# Patient Record
Sex: FEMALE | Race: BLACK OR AFRICAN AMERICAN | Employment: OTHER | URBAN - METROPOLITAN AREA
[De-identification: names, ages, dates, MRNs, and addresses within clinical notes are randomized per-mention and may not be internally consistent; named-entity substitution may affect disease eponyms.]

---

## 2018-12-20 ENCOUNTER — APPOINTMENT (OUTPATIENT)
Dept: GENERAL RADIOLOGY | Age: 83
End: 2018-12-20
Attending: NURSE PRACTITIONER
Payer: MEDICARE

## 2018-12-20 ENCOUNTER — HOSPITAL ENCOUNTER (EMERGENCY)
Age: 83
Discharge: HOME | End: 2018-12-20
Attending: EMERGENCY MEDICINE
Payer: MEDICARE

## 2018-12-20 ENCOUNTER — APPOINTMENT (OUTPATIENT)
Dept: CT IMAGING | Age: 83
End: 2018-12-20
Attending: NURSE PRACTITIONER
Payer: MEDICARE

## 2018-12-20 VITALS
OXYGEN SATURATION: 100 % | SYSTOLIC BLOOD PRESSURE: 125 MMHG | DIASTOLIC BLOOD PRESSURE: 71 MMHG | HEART RATE: 81 BPM | RESPIRATION RATE: 16 BRPM | TEMPERATURE: 97.5 F

## 2018-12-20 DIAGNOSIS — N30.01 ACUTE CYSTITIS WITH HEMATURIA: Primary | ICD-10-CM

## 2018-12-20 LAB
ALBUMIN SERPL-MCNC: 3.8 G/DL (ref 3.5–5)
ALBUMIN/GLOB SERPL: 0.7 {RATIO} (ref 1.1–2.2)
ALP SERPL-CCNC: 131 U/L (ref 45–117)
ALT SERPL-CCNC: 32 U/L (ref 12–78)
ANION GAP SERPL CALC-SCNC: 7 MMOL/L (ref 5–15)
APPEARANCE UR: ABNORMAL
APTT PPP: 22.6 SEC (ref 22.1–32)
AST SERPL-CCNC: 49 U/L (ref 15–37)
BACTERIA URNS QL MICRO: ABNORMAL /HPF
BASOPHILS # BLD: 0 K/UL (ref 0–0.1)
BASOPHILS NFR BLD: 0 % (ref 0–1)
BILIRUB SERPL-MCNC: 0.3 MG/DL (ref 0.2–1)
BILIRUB UR QL CFM: NEGATIVE
BUN SERPL-MCNC: 33 MG/DL (ref 6–20)
BUN/CREAT SERPL: 19 (ref 12–20)
CALCIUM SERPL-MCNC: 10.2 MG/DL (ref 8.5–10.1)
CHLORIDE SERPL-SCNC: 102 MMOL/L (ref 97–108)
CK SERPL-CCNC: 39 U/L (ref 26–192)
CO2 SERPL-SCNC: 28 MMOL/L (ref 21–32)
COLOR UR: ABNORMAL
CREAT SERPL-MCNC: 1.76 MG/DL (ref 0.55–1.02)
DIFFERENTIAL METHOD BLD: ABNORMAL
EOSINOPHIL # BLD: 0.1 K/UL (ref 0–0.4)
EOSINOPHIL NFR BLD: 1 % (ref 0–7)
EPITH CASTS URNS QL MICRO: ABNORMAL /LPF
ERYTHROCYTE [DISTWIDTH] IN BLOOD BY AUTOMATED COUNT: 13.7 % (ref 11.5–14.5)
GLOBULIN SER CALC-MCNC: 5.5 G/DL (ref 2–4)
GLUCOSE SERPL-MCNC: 120 MG/DL (ref 65–100)
GLUCOSE UR STRIP.AUTO-MCNC: NEGATIVE MG/DL
HCT VFR BLD AUTO: 40.2 % (ref 35–47)
HGB BLD-MCNC: 13.6 G/DL (ref 11.5–16)
HGB UR QL STRIP: NEGATIVE
HYALINE CASTS URNS QL MICRO: ABNORMAL /LPF (ref 0–5)
IMM GRANULOCYTES # BLD: 0 K/UL (ref 0–0.04)
IMM GRANULOCYTES NFR BLD AUTO: 0 % (ref 0–0.5)
INR PPP: 1.1 (ref 0.9–1.1)
KETONES UR QL STRIP.AUTO: ABNORMAL MG/DL
LACTATE BLD-SCNC: 2.12 MMOL/L (ref 0.4–2)
LACTATE BLD-SCNC: 2.53 MMOL/L (ref 0.4–2)
LACTATE SERPL-SCNC: 1.8 MMOL/L (ref 0.4–2)
LEUKOCYTE ESTERASE UR QL STRIP.AUTO: ABNORMAL
LIPASE SERPL-CCNC: 173 U/L (ref 73–393)
LYMPHOCYTES # BLD: 1.8 K/UL (ref 0.8–3.5)
LYMPHOCYTES NFR BLD: 22 % (ref 12–49)
MCH RBC QN AUTO: 33.8 PG (ref 26–34)
MCHC RBC AUTO-ENTMCNC: 33.8 G/DL (ref 30–36.5)
MCV RBC AUTO: 100 FL (ref 80–99)
MONOCYTES # BLD: 0.7 K/UL (ref 0–1)
MONOCYTES NFR BLD: 9 % (ref 5–13)
NEUTS SEG # BLD: 5.6 K/UL (ref 1.8–8)
NEUTS SEG NFR BLD: 68 % (ref 32–75)
NITRITE UR QL STRIP.AUTO: NEGATIVE
NRBC # BLD: 0 K/UL (ref 0–0.01)
NRBC BLD-RTO: 0 PER 100 WBC
OTHER,OTHU: ABNORMAL
PH UR STRIP: 5 [PH] (ref 5–8)
PLATELET # BLD AUTO: 125 K/UL (ref 150–400)
PMV BLD AUTO: 12.4 FL (ref 8.9–12.9)
POTASSIUM SERPL-SCNC: 3.9 MMOL/L (ref 3.5–5.1)
PROT SERPL-MCNC: 9.3 G/DL (ref 6.4–8.2)
PROT UR STRIP-MCNC: ABNORMAL MG/DL
PROTHROMBIN TIME: 11.7 SEC (ref 9–11.1)
RBC # BLD AUTO: 4.02 M/UL (ref 3.8–5.2)
RBC #/AREA URNS HPF: ABNORMAL /HPF (ref 0–5)
SODIUM SERPL-SCNC: 137 MMOL/L (ref 136–145)
SP GR UR REFRACTOMETRY: 1.02 (ref 1–1.03)
THERAPEUTIC RANGE,PTTT: NORMAL SECS (ref 58–77)
TROPONIN I SERPL-MCNC: <0.05 NG/ML
UROBILINOGEN UR QL STRIP.AUTO: 1 EU/DL (ref 0.2–1)
WBC # BLD AUTO: 8.2 K/UL (ref 3.6–11)
WBC URNS QL MICRO: >100 /HPF (ref 0–4)

## 2018-12-20 PROCEDURE — 93005 ELECTROCARDIOGRAM TRACING: CPT

## 2018-12-20 PROCEDURE — 74011250636 HC RX REV CODE- 250/636: Performed by: NURSE PRACTITIONER

## 2018-12-20 PROCEDURE — 83605 ASSAY OF LACTIC ACID: CPT

## 2018-12-20 PROCEDURE — 85025 COMPLETE CBC W/AUTO DIFF WBC: CPT

## 2018-12-20 PROCEDURE — 96365 THER/PROPH/DIAG IV INF INIT: CPT

## 2018-12-20 PROCEDURE — 74011000258 HC RX REV CODE- 258: Performed by: EMERGENCY MEDICINE

## 2018-12-20 PROCEDURE — 74011250637 HC RX REV CODE- 250/637: Performed by: NURSE PRACTITIONER

## 2018-12-20 PROCEDURE — 70450 CT HEAD/BRAIN W/O DYE: CPT

## 2018-12-20 PROCEDURE — 74011250636 HC RX REV CODE- 250/636: Performed by: EMERGENCY MEDICINE

## 2018-12-20 PROCEDURE — 85610 PROTHROMBIN TIME: CPT

## 2018-12-20 PROCEDURE — 85730 THROMBOPLASTIN TIME PARTIAL: CPT

## 2018-12-20 PROCEDURE — 83690 ASSAY OF LIPASE: CPT

## 2018-12-20 PROCEDURE — 96361 HYDRATE IV INFUSION ADD-ON: CPT

## 2018-12-20 PROCEDURE — 82550 ASSAY OF CK (CPK): CPT

## 2018-12-20 PROCEDURE — 80053 COMPREHEN METABOLIC PANEL: CPT

## 2018-12-20 PROCEDURE — 36415 COLL VENOUS BLD VENIPUNCTURE: CPT

## 2018-12-20 PROCEDURE — 74176 CT ABD & PELVIS W/O CONTRAST: CPT

## 2018-12-20 PROCEDURE — 99285 EMERGENCY DEPT VISIT HI MDM: CPT

## 2018-12-20 PROCEDURE — 81001 URINALYSIS AUTO W/SCOPE: CPT

## 2018-12-20 PROCEDURE — 71046 X-RAY EXAM CHEST 2 VIEWS: CPT

## 2018-12-20 PROCEDURE — 84484 ASSAY OF TROPONIN QUANT: CPT

## 2018-12-20 RX ORDER — CEPHALEXIN 500 MG/1
500 CAPSULE ORAL 2 TIMES DAILY
Qty: 14 CAP | Refills: 0 | Status: SHIPPED | OUTPATIENT
Start: 2018-12-20 | End: 2018-12-27

## 2018-12-20 RX ORDER — ACETAMINOPHEN 500 MG
1000 TABLET ORAL
Status: COMPLETED | OUTPATIENT
Start: 2018-12-20 | End: 2018-12-20

## 2018-12-20 RX ADMIN — SODIUM CHLORIDE 1000 ML: 900 INJECTION, SOLUTION INTRAVENOUS at 16:22

## 2018-12-20 RX ADMIN — CEFTRIAXONE SODIUM 1 G: 1 INJECTION, POWDER, FOR SOLUTION INTRAMUSCULAR; INTRAVENOUS at 20:03

## 2018-12-20 RX ADMIN — ACETAMINOPHEN 1000 MG: 500 TABLET ORAL at 16:21

## 2018-12-20 NOTE — ED PROVIDER NOTES
EMERGENCY DEPARTMENT HISTORY AND PHYSICAL EXAM      Date: 12/20/2018  Patient Name: Vikash Weller    History of Presenting Illness     Chief Complaint   Patient presents with    Lethargy       History Provided By: Patient    HPI: Vikash Weller, 80 y.o. female with PMHx significant for HTN, stroke, CAD, dementia, arthritis, GERD, and breast CA, presents via EMS to the ED with cc of fatigue, HA, malaise, generalized weakness, and abdominal pain. She states that she just hasn't felt well for the last two days. She activated EMS this am because she did not feel well enough to get out of bed. Pt denies fevers, chills, night sweats, chest pain, pressure, SOB, YANES, PND, orthopnea, n/v/d, melena, hematuria, dysuria, constipation, dizziness, and syncope. She does endorse and HA but family states that she has complained of a HA daily for the last several years. PCP: Deb Plata MD    Current Outpatient Medications   Medication Sig Dispense Refill    cephALEXin (KEFLEX) 500 mg capsule Take 1 Cap by mouth two (2) times a day for 7 days. 14 Cap 0    traMADol (ULTRAM) 50 mg tablet Take 0.5 Tabs by mouth every eight (8) hours as needed for Pain. Max Daily Amount: 75 mg. 15 Tab 0    metoprolol succinate (TOPROL-XL) 25 mg XL tablet Take 1 Tab by mouth daily. 30 Tab 3    furosemide (LASIX) 20 mg tablet Take one tablet daily 30 Tab 3    omeprazole (PRILOSEC) 40 mg capsule Take 1 Cap by mouth daily. 30 Cap 3    nitroglycerin (NITROSTAT) 0.4 mg SL tablet 0.4 mg by SubLINGual route every five (5) minutes as needed for Chest Pain.  warfarin (COUMADIN) 5 mg tablet Take 5 mg by mouth daily.  atorvastatin (LIPITOR) 20 mg tablet Take 20 mg by mouth daily.  lisinopril (PRINIVIL, ZESTRIL) 5 mg tablet Take 5 mg by mouth daily.  aspirin 81 mg chewable tablet Take 81 mg by mouth daily.          Past History     Past Medical History:  Past Medical History:   Diagnosis Date    Acid reflux  Arthritis     CAD (coronary artery disease)     MI, no cardiologist at this time    [facility])     breast cancer    Dementia     Hypertension     Stroke ([facility] [address]     approx 15 years prior per daughter (2000)       Past Surgical History:  Past Surgical History:   Procedure Laterality Date    BREAST SURGERY PROCEDURE UNLISTED      right breast removal due to cancer    CARDIAC SURG PROCEDURE UNLIST      x1 stent placed    HX GYN      x6 vaginal births       Family History:  No family history on file. Social History:  Social History     Tobacco Use    Smoking status: Never Smoker   Substance Use Topics    Alcohol use: No    Drug use: Not on file       Allergies:  No Known Allergies      Review of Systems   Review of Systems   Constitutional: Positive for fatigue. Negative for activity change, appetite change, chills, diaphoresis, fever and unexpected weight change. HENT: Negative for congestion, ear pain, rhinorrhea, sinus pressure, sore throat and tinnitus. Eyes: Negative for photophobia, pain, discharge and visual disturbance. Respiratory: Negative for apnea, cough, choking, chest tightness, shortness of breath, wheezing and stridor. Cardiovascular: Negative for chest pain, palpitations and leg swelling. Gastrointestinal: Positive for abdominal pain. Negative for constipation, diarrhea, nausea and vomiting. Endocrine: Negative for polydipsia, polyphagia and polyuria. Genitourinary: Negative for decreased urine volume, dyspareunia, dysuria, enuresis, flank pain, frequency, hematuria and urgency. Musculoskeletal: Negative for arthralgias, back pain, gait problem, myalgias and neck pain. Skin: Negative for color change, pallor, rash and wound. Allergic/Immunologic: Negative for immunocompromised state. Neurological: Positive for weakness and headaches. Negative for dizziness, seizures, syncope and light-headedness. Hematological: Does not bruise/bleed easily. Psychiatric/Behavioral: Negative for agitation and confusion. The patient is not nervous/anxious. Physical Exam   Physical Exam   Constitutional: She is oriented to person, place, and time. She appears well-developed and well-nourished. No distress. HENT:   Head: Normocephalic. Right Ear: External ear normal.   Left Ear: External ear normal.   Mouth/Throat: Oropharynx is clear and moist. No oropharyngeal exudate. Eyes: Conjunctivae and EOM are normal. Pupils are equal, round, and reactive to light. Right eye exhibits no discharge. Left eye exhibits no discharge. No scleral icterus. Neck: Normal range of motion. Neck supple. No JVD present. No tracheal deviation present. No thyromegaly present. Cardiovascular: Normal rate, regular rhythm, normal heart sounds and intact distal pulses. Exam reveals no gallop and no friction rub. No murmur heard. Pulmonary/Chest: Effort normal and breath sounds normal. No stridor. No respiratory distress. She has no wheezes. She has no rales. She exhibits no tenderness. Abdominal: Soft. Bowel sounds are normal. She exhibits no distension and no mass. There is no tenderness. There is no rebound and no guarding. Musculoskeletal: Normal range of motion. She exhibits no edema or tenderness. Lymphadenopathy:     She has no cervical adenopathy. Neurological: She is alert and oriented to person, place, and time. She displays normal reflexes. No cranial nerve deficit. Coordination normal.   Skin: Skin is warm and dry. No rash noted. She is not diaphoretic. No erythema. No pallor. Psychiatric: She has a normal mood and affect. Her behavior is normal. Judgment and thought content normal.   Nursing note and vitals reviewed. Diagnostic Study Results     Labs -   No results found for this or any previous visit (from the past 12 hour(s)).     Radiologic Studies -   CT ABD PELV WO CONT   Final Result   IMPRESSION:      Cholelithiasis    Persistent complex pelvic mass, possibly a pedunculated fibroid or complex   ovarian cyst    Incidental sigmoid diverticulosis      CT HEAD WO CONT   Final Result   IMPRESSION:      Stable nonspecific white matter disease. No acute intracranial hemorrhage seen. XR CHEST PA LAT   Final Result   IMPRESSION: No acute process. Mild T7 wedge deformity. CT Results  (Last 48 hours)               12/20/18 1809  CT HEAD WO CONT Final result    Impression:  IMPRESSION:       Stable nonspecific white matter disease. No acute intracranial hemorrhage seen. Narrative:  EXAM: CT HEAD WO CONT       INDICATION: Headache for 2 days, nausea, and lethargy       COMPARISON: 4/2/2015. CONTRAST: None. TECHNIQUE: Unenhanced CT of the head was performed using 5 mm images. Brain and   bone windows were generated. CT dose reduction was achieved through use of a   standardized protocol tailored for this examination and automatic exposure   control for dose modulation. FINDINGS:   The ventricles and sulci are normal in size, shape and configuration and   midline. There is moderate periventricular white matter hypodensity. Stable   hypodensity is present in the right external capsule. Bifrontal volume loss is   seen. There is no intracranial hemorrhage, extra-axial collection, mass, mass   effect or midline shift. The basilar cisterns are open. No acute infarct is   identified. The bone windows demonstrate no abnormalities. The visualized   portions of the paranasal sinuses and mastoid air cells are clear. 12/20/18 1809  CT ABD PELV WO CONT Final result    Impression:  IMPRESSION:       Cholelithiasis    Persistent complex pelvic mass, possibly a pedunculated fibroid or complex   ovarian cyst    Incidental sigmoid diverticulosis       Narrative:  EXAM: CT ABD PELV WO CONT       INDICATION: Abdominal pain and lethargy for 2 days        COMPARISON: 4/26/2016       CONTRAST:  None.        TECHNIQUE:    Thin axial images were obtained through the abdomen and pelvis. Coronal and   sagittal reconstructions were generated. Oral contrast was not administered. CT   dose reduction was achieved through use of a standardized protocol tailored for   this examination and automatic exposure control for dose modulation. The absence of intravenous contrast material reduces the sensitivity for   evaluation of the solid parenchymal organs of the abdomen. FINDINGS:    LUNG BASES: Clear. INCIDENTALLY IMAGED HEART AND MEDIASTINUM: Unremarkable. LIVER: No mass or biliary dilatation. GALLBLADDER: Layering gallstones. SPLEEN: No mass. PANCREAS: No mass or ductal dilatation. ADRENALS: Unremarkable. KIDNEYS/URETERS: No mass, calculus, or hydronephrosis. Right mid renal 2 cm cyst   with Hounsfield unit measurement of 5 7. STOMACH: Unremarkable. SMALL BOWEL: No dilatation or wall thickening. COLON: No dilatation or wall thickening. Sigmoid diverticula   APPENDIX: Unremarkable. PERITONEUM: No ascites or pneumoperitoneum. RETROPERITONEUM: No lymphadenopathy or aortic aneurysm. REPRODUCTIVE ORGANS: Fibroid uterus. Anterior to the uterus is a persistent   partially solid partially cystic mass that measures 6.6 x 5.7 x 6.1 cm (image   67). Previously, this measured 7.6 x 6.6 x 6.9 cm. URINARY BLADDER: No mass or calculus. BONES: No destructive bone lesion. ADDITIONAL COMMENTS: N/A               CXR Results  (Last 48 hours)               12/20/18 1635  XR CHEST PA LAT Final result    Impression:  IMPRESSION: No acute process. Mild T7 wedge deformity. Narrative:  EXAM: XR CHEST PA LAT       INDICATION: malaise       COMPARISON: 4/26/2016. FINDINGS: A single view of the chest demonstrates mild in cardiomegaly.  tThe   cardiac and mediastinal contours and pulmonary vascularity are normal. There is   a wedge deformity of the T7 vertebral body                   Medical Decision Making   I am the first provider for this patient. I reviewed the vital signs, available nursing notes, past medical history, past surgical history, family history and social history. Vital Signs-Reviewed the patient's vital signs. Visit Vitals  /71   Pulse 81   Temp 97.5 °F (36.4 °C)   Resp 16   SpO2 100%       Pulse Oximetry Analysis - 100% on RA    Cardiac Monitor:   Rate: 91 bpm      Records Reviewed: Nursing Notes, Old Medical Records, Previous electrocardiograms, Previous Radiology Studies and Previous Laboratory Studies    Provider Notes (Medical Decision Making):   Malaise  HA  Abdominal pain    Routine laboratory data and UA  CT head  CT abd/pelvis  Analgesia   Rocephin     ED Course:   Initial assessment performed. The patients presenting problems have been discussed, and they are in agreement with the care plan formulated and outlined with them. I have encouraged them to ask questions as they arise throughout their visit. Stable ambulatory pt in [facility]    Critical Care Time:   0    Disposition:  Discharge to home with PCP follow up  PT already established and currently working with patient     PLAN:  1. Discharge Medication List as of 12/20/2018  7:54 PM      CONTINUE these medications which have CHANGED    Details   cephALEXin (KEFLEX) 500 mg capsule Take 1 Cap by mouth two (2) times a day for 7 days. , Normal, Disp-14 Cap, R-0         CONTINUE these medications which have NOT CHANGED    Details   traMADol (ULTRAM) 50 mg tablet Take 0.5 Tabs by mouth every eight (8) hours as needed for Pain. Max Daily Amount: 75 mg., Print, Disp-15 Tab, R-0      metoprolol succinate (TOPROL-XL) 25 mg XL tablet Take 1 Tab by mouth daily. , Print, Disp-30 Tab, R-3      furosemide (LASIX) 20 mg tablet Take one tablet daily, Print, Disp-30 Tab, R-3      omeprazole (PRILOSEC) 40 mg capsule Take 1 Cap by mouth daily. , Print, Disp-30 Cap, R-3      nitroglycerin (NITROSTAT) 0.4 mg SL tablet 0.4 mg by SubLINGual route every five (5) minutes as needed for Chest Pain., Historical Med      warfarin (COUMADIN) 5 mg tablet Take 5 mg by mouth daily. , Historical Med      atorvastatin (LIPITOR) 20 mg tablet Take 20 mg by mouth daily. , Historical Med      lisinopril (PRINIVIL, ZESTRIL) 5 mg tablet Take 5 mg by mouth daily. , Historical Med      aspirin 81 mg chewable tablet Take 81 mg by mouth daily. , Historical Med           2. Follow-up Information     Follow up With Specialties Details Why Airam Uriarte MD Family Practice In 1 day  [address] [place] [address]  [phone]      [facility] EMERGENCY DEPT Emergency Medicine  As needed, If symptoms worsen [address] [place] [address] Drive  [address] [facility]  [phone]        Return to ED if worse     Diagnosis     Clinical Impression:   1.  Acute cystitis with hematuria        Attestations:    Karena Yung NP  8:34 PM

## 2018-12-20 NOTE — ED TRIAGE NOTES
Patient arrives to the emergency department via EMS with a chief complaint of lethargy and a headache x2 days. Patient reports feeling more lethargic the last 2 days than normal. Reports nausea and a frontal headache x2 days. Also reports unsteady gait, patient reports she thinks it is because she is very weak.

## 2018-12-21 LAB
ATRIAL RATE: 102 BPM
CALCULATED R AXIS, ECG10: -52 DEGREES
CALCULATED T AXIS, ECG11: 92 DEGREES
DIAGNOSIS, 93000: NORMAL
Q-T INTERVAL, ECG07: 362 MS
QRS DURATION, ECG06: 94 MS
QTC CALCULATION (BEZET), ECG08: 471 MS
VENTRICULAR RATE, ECG03: 102 BPM

## 2018-12-21 NOTE — ED NOTES
Justin Sotelo. PA reviewed discharge instructions with the patient. The patient verbalized understanding. All questions and concerns were addressed. The patient was taken out in a wheelchair and is discharged ambulatory in the care of family members with instructions and prescriptions in hand. Pt is alert and oriented x 4. Respirations are clear and unlabored.

## 2018-12-21 NOTE — DISCHARGE INSTRUCTIONS
Urinary Tract Infection in Women: Care Instructions  Your Care Instructions    A urinary tract infection, or UTI, is a general term for an infection anywhere between the kidneys and the urethra (where urine comes out). Most UTIs are bladder infections. They often cause pain or burning when you urinate. UTIs are caused by bacteria and can be cured with antibiotics. Be sure to complete your treatment so that the infection goes away. Follow-up care is a key part of your treatment and safety. Be sure to make and go to all appointments, and call your doctor if you are having problems. It's also a good idea to know your test results and keep a list of the medicines you take. How can you care for yourself at home? · Take your antibiotics as directed. Do not stop taking them just because you feel better. You need to take the full course of antibiotics. · Drink extra water and other fluids for the next day or two. This may help wash out the bacteria that are causing the infection. (If you have kidney, heart, or liver disease and have to limit fluids, talk with your doctor before you increase your fluid intake.)  · Avoid drinks that are carbonated or have caffeine. They can irritate the bladder. · Urinate often. Try to empty your bladder each time. · To relieve pain, take a hot bath or lay a heating pad set on low over your lower belly or genital area. Never go to sleep with a heating pad in place. To prevent UTIs  · Drink plenty of water each day. This helps you urinate often, which clears bacteria from your system. (If you have kidney, heart, or liver disease and have to limit fluids, talk with your doctor before you increase your fluid intake.)  · Urinate when you need to. · Urinate right after you have sex. · Change sanitary pads often. · Avoid douches, bubble baths, feminine hygiene sprays, and other feminine hygiene products that have deodorants.   · After going to the bathroom, wipe from front to back.  When should you call for help? Call your doctor now or seek immediate medical care if:    · Symptoms such as fever, chills, nausea, or vomiting get worse or appear for the first time.     · You have new pain in your back just below your rib cage. This is called flank pain.     · There is new blood or pus in your urine.     · You have any problems with your antibiotic medicine.    Watch closely for changes in your health, and be sure to contact your doctor if:    · You are not getting better after taking an antibiotic for 2 days.     · Your symptoms go away but then come back. Where can you learn more? Go to http://jamshid-hilda.info/. Enter P984 in the search box to learn more about \"Urinary Tract Infection in Women: Care Instructions. \"  Current as of: March 21, 2018  Content Version: 11.8  © 7358-5460 [employer]. Care instructions adapted under license by [employer] (which disclaims liability or warranty for this information). If you have questions about a medical condition or this instruction, always ask your healthcare professional. [address] any warranty or liability for your use of this information. We hope that we have addressed all of your medical concerns. The examination and treatment you received in the Emergency Department were for an emergent problem and were not intended as complete care. It is important that you follow up with your healthcare provider(s) for ongoing care. If your symptoms worsen or do not improve as expected, and you are unable to reach your usual health care provider(s), you should return to the Emergency Department. [facility] participate in nationally recognized quality of care measures. If your blood pressure is greater than 120/80, as reported below, we urge that you seek medical care to address the potential of high blood pressure, commonly known as hypertension. Hypertension can be hereditary or can be caused by certain medical conditions, pain, stress, or \"white coat syndrome. \"       Please make an appointment with your health care provider(s) for follow up of your Emergency Department visit. VITALS:   Patient Vitals for the past 8 hrs:   Temp Pulse Resp BP SpO2   12/20/18 1744 -- 78 15 106/50 100 %   12/20/18 1505 97.5 °F (36.4 °C) 91 14 121/51 100 %          Thank you for allowing us to provide you with medical care today. We realize that you have many choices for your emergency care needs. Please choose us in the future for any continued health care needs. Kapil Bryant NP              Recent Results (from the past 24 hour(s))   EKG, 12 LEAD, INITIAL    Collection Time: 12/20/18  2:55 PM   Result Value Ref Range    Ventricular Rate 102 BPM    Atrial Rate 102 BPM    QRS Duration 94 ms    Q-T Interval 362 ms    QTC Calculation (Bezet) 471 ms    Calculated R Axis -52 degrees    Calculated T Axis 92 degrees    Diagnosis       Atrial fibrillation with rapid ventricular response  Left axis deviation  Incomplete right bundle branch block  Septal infarct (cited on or before 20-DEC-2018)  When compared with ECG of 26-APR-2016 17:51,  Incomplete right bundle branch block is now present  Questionable change in initial forces of Septal leads     POC LACTIC ACID    Collection Time: 12/20/18  4:02 PM   Result Value Ref Range    Lactic Acid (POC) 2.12 (HH) 0.40 - 0.45 mmol/L   METABOLIC PANEL, COMPREHENSIVE    Collection Time: 12/20/18  4:09 PM   Result Value Ref Range    Sodium 137 136 - 145 mmol/L    Potassium 3.9 3.5 - 5.1 mmol/L    Chloride 102 97 - 108 mmol/L    CO2 28 21 - 32 mmol/L    Anion gap 7 5 - 15 mmol/L    Glucose 120 (H) 65 - 100 mg/dL    BUN 33 (H) 6 - 20 MG/DL    Creatinine 1.76 (H) 0.55 - 1.02 MG/DL    BUN/Creatinine ratio 19 12 - 20      GFR est AA 33 (L) >60 ml/min/1.73m2    GFR est non-AA 27 (L) >60 ml/min/1.73m2    Calcium 10. 2 (H) 8.5 - 10.1 MG/DL    Bilirubin, total 0.3 0.2 - 1.0 MG/DL    ALT (SGPT) 32 12 - 78 U/L    AST (SGOT) 49 (H) 15 - 37 U/L    Alk. phosphatase 131 (H) 45 - 117 U/L    Protein, total 9.3 (H) 6.4 - 8.2 g/dL    Albumin 3.8 3.5 - 5.0 g/dL    Globulin 5.5 (H) 2.0 - 4.0 g/dL    A-G Ratio 0.7 (L) 1.1 - 2.2     CBC WITH AUTOMATED DIFF    Collection Time: 12/20/18  4:09 PM   Result Value Ref Range    WBC 8.2 3.6 - 11.0 K/uL    RBC 4.02 3.80 - 5.20 M/uL    HGB 13.6 11.5 - 16.0 g/dL    HCT 40.2 35.0 - 47.0 %    .0 (H) 80.0 - 99.0 FL    MCH 33.8 26.0 - 34.0 PG    MCHC 33.8 30.0 - 36.5 g/dL    RDW 13.7 11.5 - 14.5 %    PLATELET 130 (L) 433 - 400 K/uL    MPV 12.4 8.9 - 12.9 FL    NRBC 0.0 0  WBC    ABSOLUTE NRBC 0.00 0.00 - 0.01 K/uL    NEUTROPHILS 68 32 - 75 %    LYMPHOCYTES 22 12 - 49 %    MONOCYTES 9 5 - 13 %    EOSINOPHILS 1 0 - 7 %    BASOPHILS 0 0 - 1 %    IMMATURE GRANULOCYTES 0 0.0 - 0.5 %    ABS. NEUTROPHILS 5.6 1.8 - 8.0 K/UL    ABS. LYMPHOCYTES 1.8 0.8 - 3.5 K/UL    ABS. MONOCYTES 0.7 0.0 - 1.0 K/UL    ABS. EOSINOPHILS 0.1 0.0 - 0.4 K/UL    ABS. BASOPHILS 0.0 0.0 - 0.1 K/UL    ABS. IMM.  GRANS. 0.0 0.00 - 0.04 K/UL    DF AUTOMATED     LIPASE    Collection Time: 12/20/18  4:09 PM   Result Value Ref Range    Lipase 173 73 - 393 U/L   TROPONIN I    Collection Time: 12/20/18  4:09 PM   Result Value Ref Range    Troponin-I, Qt. <0.05 <0.05 ng/mL   PROTHROMBIN TIME + INR    Collection Time: 12/20/18  4:09 PM   Result Value Ref Range    INR 1.1 0.9 - 1.1      Prothrombin time 11.7 (H) 9.0 - 11.1 sec   PTT    Collection Time: 12/20/18  4:09 PM   Result Value Ref Range    aPTT 22.6 22.1 - 32.0 sec    aPTT, therapeutic range     58.0 - 77.0 SECS   CK    Collection Time: 12/20/18  4:09 PM   Result Value Ref Range    CK 39 26 - 192 U/L   URINALYSIS W/MICROSCOPIC    Collection Time: 12/20/18  5:35 PM   Result Value Ref Range    Color DARK YELLOW      Appearance CLOUDY (A) CLEAR      Specific gravity 1.024 1.003 - 1.030      pH (UA) 5.0 5.0 - 8.0      Protein TRACE (A) NEG mg/dL    Glucose NEGATIVE  NEG mg/dL    Ketone TRACE (A) NEG mg/dL    Blood NEGATIVE  NEG      Urobilinogen 1.0 0.2 - 1.0 EU/dL    Nitrites NEGATIVE  NEG      Leukocyte Esterase LARGE (A) NEG      WBC >100 (H) 0 - 4 /hpf    RBC 10-20 0 - 5 /hpf    Epithelial cells MANY (A) FEW /lpf    Bacteria 3+ (A) NEG /hpf    Hyaline cast 2-5 0 - 5 /lpf    Other: Renal Epithelial cells Present     BILIRUBIN, CONFIRM    Collection Time: 12/20/18  5:35 PM   Result Value Ref Range    Bilirubin UA, confirm NEGATIVE  NEG     POC LACTIC ACID    Collection Time: 12/20/18  6:32 PM   Result Value Ref Range    Lactic Acid (POC) 2.53 (HH) 0.40 - 2.00 mmol/L       Xr Chest Pa Lat    Result Date: 12/20/2018  EXAM: XR CHEST PA LAT INDICATION: malaise COMPARISON: 4/26/2016. FINDINGS: A single view of the chest demonstrates mild in cardiomegaly. tThe cardiac and mediastinal contours and pulmonary vascularity are normal. There is a wedge deformity of the T7 vertebral body     IMPRESSION: No acute process. Mild T7 wedge deformity. Ct Head Wo Cont    Result Date: 12/20/2018  EXAM: CT HEAD WO CONT INDICATION: Headache for 2 days, nausea, and lethargy COMPARISON: 4/2/2015. CONTRAST: None. TECHNIQUE: Unenhanced CT of the head was performed using 5 mm images. Brain and bone windows were generated. CT dose reduction was achieved through use of a standardized protocol tailored for this examination and automatic exposure control for dose modulation. FINDINGS: The ventricles and sulci are normal in size, shape and configuration and midline. There is moderate periventricular white matter hypodensity. Stable hypodensity is present in the right external capsule. Bifrontal volume loss is seen. There is no intracranial hemorrhage, extra-axial collection, mass, mass effect or midline shift. The basilar cisterns are open. No acute infarct is identified.  The bone windows demonstrate no abnormalities. The visualized portions of the paranasal sinuses and mastoid air cells are clear. IMPRESSION: Stable nonspecific white matter disease. No acute intracranial hemorrhage seen. Ct Abd Pelv Wo Cont    Result Date: 12/20/2018  EXAM: CT ABD PELV WO CONT INDICATION: Abdominal pain and lethargy for 2 days COMPARISON: 4/26/2016 CONTRAST:  None. TECHNIQUE: Thin axial images were obtained through the abdomen and pelvis. Coronal and sagittal reconstructions were generated. Oral contrast was not administered. CT dose reduction was achieved through use of a standardized protocol tailored for this examination and automatic exposure control for dose modulation. The absence of intravenous contrast material reduces the sensitivity for evaluation of the solid parenchymal organs of the abdomen. FINDINGS: LUNG BASES: Clear. INCIDENTALLY IMAGED HEART AND MEDIASTINUM: Unremarkable. LIVER: No mass or biliary dilatation. GALLBLADDER: Layering gallstones. SPLEEN: No mass. PANCREAS: No mass or ductal dilatation. ADRENALS: Unremarkable. KIDNEYS/URETERS: No mass, calculus, or hydronephrosis. Right mid renal 2 cm cyst with Hounsfield unit measurement of 5 7. STOMACH: Unremarkable. SMALL BOWEL: No dilatation or wall thickening. COLON: No dilatation or wall thickening. Sigmoid diverticula APPENDIX: Unremarkable. PERITONEUM: No ascites or pneumoperitoneum. RETROPERITONEUM: No lymphadenopathy or aortic aneurysm. REPRODUCTIVE ORGANS: Fibroid uterus. Anterior to the uterus is a persistent partially solid partially cystic mass that measures 6.6 x 5.7 x 6.1 cm (image 67). Previously, this measured 7.6 x 6.6 x 6.9 cm. URINARY BLADDER: No mass or calculus. BONES: No destructive bone lesion.  ADDITIONAL COMMENTS: N/A     IMPRESSION: Cholelithiasis  Persistent complex pelvic mass, possibly a pedunculated fibroid or complex ovarian cyst  Incidental sigmoid diverticulosis

## 2019-05-06 ENCOUNTER — HOSPITAL ENCOUNTER (EMERGENCY)
Age: 84
Discharge: HOME | End: 2019-05-06
Attending: EMERGENCY MEDICINE
Payer: MEDICARE

## 2019-05-06 VITALS
HEIGHT: 64 IN | RESPIRATION RATE: 16 BRPM | WEIGHT: 150 LBS | DIASTOLIC BLOOD PRESSURE: 79 MMHG | TEMPERATURE: 98.1 F | SYSTOLIC BLOOD PRESSURE: 103 MMHG | OXYGEN SATURATION: 100 % | HEART RATE: 70 BPM | BODY MASS INDEX: 25.61 KG/M2

## 2019-05-06 DIAGNOSIS — R63.0 ANOREXIA: ICD-10-CM

## 2019-05-06 DIAGNOSIS — E86.0 DEHYDRATION: Primary | ICD-10-CM

## 2019-05-06 DIAGNOSIS — N30.00 ACUTE CYSTITIS WITHOUT HEMATURIA: ICD-10-CM

## 2019-05-06 DIAGNOSIS — N17.9 AKI (ACUTE KIDNEY INJURY) (HCC): ICD-10-CM

## 2019-05-06 LAB
ALBUMIN SERPL-MCNC: 4.1 G/DL (ref 3.5–5)
ALBUMIN/GLOB SERPL: 0.7 {RATIO} (ref 1.1–2.2)
ALP SERPL-CCNC: 145 U/L (ref 45–117)
ALT SERPL-CCNC: 121 U/L (ref 12–78)
ANION GAP SERPL CALC-SCNC: 8 MMOL/L (ref 5–15)
ANION GAP SERPL CALC-SCNC: 9 MMOL/L (ref 5–15)
APPEARANCE UR: ABNORMAL
AST SERPL-CCNC: 182 U/L (ref 15–37)
BACTERIA URNS QL MICRO: ABNORMAL /HPF
BASOPHILS # BLD: 0 K/UL (ref 0–0.1)
BASOPHILS NFR BLD: 0 % (ref 0–1)
BILIRUB SERPL-MCNC: 0.5 MG/DL (ref 0.2–1)
BILIRUB UR QL: NEGATIVE
BUN SERPL-MCNC: 47 MG/DL (ref 6–20)
BUN SERPL-MCNC: 49 MG/DL (ref 6–20)
BUN/CREAT SERPL: 26 (ref 12–20)
BUN/CREAT SERPL: 31 (ref 12–20)
CALCIUM SERPL-MCNC: 10.3 MG/DL (ref 8.5–10.1)
CALCIUM SERPL-MCNC: 9.2 MG/DL (ref 8.5–10.1)
CHLORIDE SERPL-SCNC: 103 MMOL/L (ref 97–108)
CHLORIDE SERPL-SCNC: 108 MMOL/L (ref 97–108)
CO2 SERPL-SCNC: 22 MMOL/L (ref 21–32)
CO2 SERPL-SCNC: 27 MMOL/L (ref 21–32)
COLOR UR: ABNORMAL
CREAT SERPL-MCNC: 1.54 MG/DL (ref 0.55–1.02)
CREAT SERPL-MCNC: 1.85 MG/DL (ref 0.55–1.02)
DIFFERENTIAL METHOD BLD: ABNORMAL
EOSINOPHIL # BLD: 0.1 K/UL (ref 0–0.4)
EOSINOPHIL NFR BLD: 1 % (ref 0–7)
EPITH CASTS URNS QL MICRO: ABNORMAL /LPF
ERYTHROCYTE [DISTWIDTH] IN BLOOD BY AUTOMATED COUNT: 13.9 % (ref 11.5–14.5)
GLOBULIN SER CALC-MCNC: 5.5 G/DL (ref 2–4)
GLUCOSE SERPL-MCNC: 100 MG/DL (ref 65–100)
GLUCOSE SERPL-MCNC: 79 MG/DL (ref 65–100)
GLUCOSE UR STRIP.AUTO-MCNC: NEGATIVE MG/DL
HCT VFR BLD AUTO: 37.4 % (ref 35–47)
HGB BLD-MCNC: 12.6 G/DL (ref 11.5–16)
HGB UR QL STRIP: NEGATIVE
HYALINE CASTS URNS QL MICRO: ABNORMAL /LPF (ref 0–5)
IMM GRANULOCYTES # BLD AUTO: 0 K/UL (ref 0–0.04)
IMM GRANULOCYTES NFR BLD AUTO: 0 % (ref 0–0.5)
KETONES UR QL STRIP.AUTO: ABNORMAL MG/DL
LEUKOCYTE ESTERASE UR QL STRIP.AUTO: ABNORMAL
LYMPHOCYTES # BLD: 1.5 K/UL (ref 0.8–3.5)
LYMPHOCYTES NFR BLD: 21 % (ref 12–49)
MAGNESIUM SERPL-MCNC: 2.6 MG/DL (ref 1.6–2.4)
MCH RBC QN AUTO: 34.1 PG (ref 26–34)
MCHC RBC AUTO-ENTMCNC: 33.7 G/DL (ref 30–36.5)
MCV RBC AUTO: 101.4 FL (ref 80–99)
MONOCYTES # BLD: 0.4 K/UL (ref 0–1)
MONOCYTES NFR BLD: 6 % (ref 5–13)
MUCOUS THREADS URNS QL MICRO: ABNORMAL /LPF
NEUTS SEG # BLD: 5.1 K/UL (ref 1.8–8)
NEUTS SEG NFR BLD: 72 % (ref 32–75)
NITRITE UR QL STRIP.AUTO: NEGATIVE
NRBC # BLD: 0.02 K/UL (ref 0–0.01)
NRBC BLD-RTO: 0.3 PER 100 WBC
PH UR STRIP: 5 [PH] (ref 5–8)
PLATELET # BLD AUTO: 142 K/UL (ref 150–400)
PMV BLD AUTO: 11.8 FL (ref 8.9–12.9)
POTASSIUM SERPL-SCNC: 4.1 MMOL/L (ref 3.5–5.1)
POTASSIUM SERPL-SCNC: 4.8 MMOL/L (ref 3.5–5.1)
PROT SERPL-MCNC: 9.6 G/DL (ref 6.4–8.2)
PROT UR STRIP-MCNC: NEGATIVE MG/DL
RBC # BLD AUTO: 3.69 M/UL (ref 3.8–5.2)
RBC #/AREA URNS HPF: ABNORMAL /HPF (ref 0–5)
SODIUM SERPL-SCNC: 138 MMOL/L (ref 136–145)
SODIUM SERPL-SCNC: 139 MMOL/L (ref 136–145)
SP GR UR REFRACTOMETRY: 1.02 (ref 1–1.03)
UA: UC IF INDICATED,UAUC: ABNORMAL
UROBILINOGEN UR QL STRIP.AUTO: 0.2 EU/DL (ref 0.2–1)
WBC # BLD AUTO: 7.1 K/UL (ref 3.6–11)
WBC URNS QL MICRO: ABNORMAL /HPF (ref 0–4)

## 2019-05-06 PROCEDURE — 96360 HYDRATION IV INFUSION INIT: CPT

## 2019-05-06 PROCEDURE — 36415 COLL VENOUS BLD VENIPUNCTURE: CPT

## 2019-05-06 PROCEDURE — 85025 COMPLETE CBC W/AUTO DIFF WBC: CPT

## 2019-05-06 PROCEDURE — 74011250636 HC RX REV CODE- 250/636: Performed by: EMERGENCY MEDICINE

## 2019-05-06 PROCEDURE — 81001 URINALYSIS AUTO W/SCOPE: CPT

## 2019-05-06 PROCEDURE — 83735 ASSAY OF MAGNESIUM: CPT

## 2019-05-06 PROCEDURE — 87086 URINE CULTURE/COLONY COUNT: CPT

## 2019-05-06 PROCEDURE — 96361 HYDRATE IV INFUSION ADD-ON: CPT

## 2019-05-06 PROCEDURE — 80053 COMPREHEN METABOLIC PANEL: CPT

## 2019-05-06 PROCEDURE — 99283 EMERGENCY DEPT VISIT LOW MDM: CPT

## 2019-05-06 RX ORDER — DICYCLOMINE HYDROCHLORIDE 20 MG/1
20 TABLET ORAL
Status: DISCONTINUED | OUTPATIENT
Start: 2019-05-06 | End: 2019-05-06

## 2019-05-06 RX ORDER — PREDNISONE 50 MG/1
50 TABLET ORAL DAILY
Qty: 3 TAB | Refills: 0 | Status: SHIPPED | OUTPATIENT
Start: 2019-05-06 | End: 2019-05-09

## 2019-05-06 RX ORDER — CEPHALEXIN 500 MG/1
500 CAPSULE ORAL 4 TIMES DAILY
Qty: 28 CAP | Refills: 0 | Status: SHIPPED | OUTPATIENT
Start: 2019-05-06 | End: 2019-05-13

## 2019-05-06 RX ADMIN — SODIUM CHLORIDE 1000 ML: 900 INJECTION, SOLUTION INTRAVENOUS at 16:51

## 2019-05-06 NOTE — ED PROVIDER NOTES
EMERGENCY DEPARTMENT HISTORY AND PHYSICAL EXAM 
 
 
Date: 5/6/2019 Patient Name: Enrico Weeks Patient Age and Sex: 80 y.o. female History of Presenting Illness Chief Complaint Patient presents with  Anorexia  
  pt's family reports recent loss of appetite for the last 6 months. History Provided By: Patient HPI: Enrico Weeks is an 51-year-old female with a history of dementia, anorexia, presenting for a weight loss and anorexia. According to daughter and son-in-law who lives with patient, patient has had weight loss over the past couple of months, has not been eating and only drinks little water. They have been trying to give her Ensure. Her primary care doctor has prescribed an appetite stimulant but it has not been helping. When asking patient, states that it is not an issue with food tasting bad or nausea. She just does not want to eat. Denies any abdominal pain, chest pain, diarrhea, constipation, dysuria. When asked about mood, patient states that her mood is okay and that she is not depressed. There are no other complaints, changes, or physical findings at this time. PCP: Eugenia Murillo., MD 
 
No current facility-administered medications on file prior to encounter. Current Outpatient Medications on File Prior to Encounter Medication Sig Dispense Refill  traMADol (ULTRAM) 50 mg tablet Take 0.5 Tabs by mouth every eight (8) hours as needed for Pain. Max Daily Amount: 75 mg. 15 Tab 0  
 metoprolol succinate (TOPROL-XL) 25 mg XL tablet Take 1 Tab by mouth daily. 30 Tab 3  furosemide (LASIX) 20 mg tablet Take one tablet daily 30 Tab 3  
 omeprazole (PRILOSEC) 40 mg capsule Take 1 Cap by mouth daily. 30 Cap 3  
 nitroglycerin (NITROSTAT) 0.4 mg SL tablet 0.4 mg by SubLINGual route every five (5) minutes as needed for Chest Pain.  warfarin (COUMADIN) 5 mg tablet Take 5 mg by mouth daily.  atorvastatin (LIPITOR) 20 mg tablet Take 20 mg by mouth daily.  lisinopril (PRINIVIL, ZESTRIL) 5 mg tablet Take 5 mg by mouth daily.  aspirin 81 mg chewable tablet Take 81 mg by mouth daily. Past History Past Medical History: 
Past Medical History:  
Diagnosis Date  Acid reflux  Arthritis  CAD (coronary artery disease) MI, no cardiologist at this time  Cancer ([facility] [address] breast cancer  Dementia  Hypertension  Stroke ([facility] [address] approx 15 years prior per daughter (2000) Past Surgical History: 
Past Surgical History:  
Procedure Laterality Date  BREAST SURGERY PROCEDURE UNLISTED    
 right breast removal due to cancer  CARDIAC SURG PROCEDURE UNLIST    
 x1 stent placed  HX GYN    
 x6 vaginal births Family History: No family history on file. Social History: 
Social History Tobacco Use  Smoking status: Never Smoker Substance Use Topics  Alcohol use: No  
 Drug use: Not on file Allergies: 
No Known Allergies Review of Systems Review of Systems Constitutional: Positive for appetite change and fatigue. Negative for chills and fever. Respiratory: Negative for cough and shortness of breath. Cardiovascular: Negative for chest pain. Gastrointestinal: Negative for abdominal pain, constipation, diarrhea, nausea and vomiting. Neurological: Negative for weakness and numbness. All other systems reviewed and are negative. Physical Exam  
Physical Exam  
Constitutional: She is oriented to person, place, and time. She appears well-developed and well-nourished. HENT:  
Head: Normocephalic and atraumatic. Dry mucous membranes Eyes: Conjunctivae and EOM are normal.  
Neck: Normal range of motion. Neck supple. Cardiovascular: Normal rate and regular rhythm. Pulmonary/Chest: Effort normal and breath sounds normal. No respiratory distress. Abdominal: Soft. She exhibits no distension. There is no tenderness. Musculoskeletal: Normal range of motion. Neurological: She is alert and oriented to person, place, and time. Skin: Skin is warm and dry. Psychiatric: She has a normal mood and affect. Nursing note and vitals reviewed. Diagnostic Study Results Labs - Recent Results (from the past 12 hour(s)) CBC WITH AUTOMATED DIFF Collection Time: 05/06/19  3:36 PM  
Result Value Ref Range WBC 7.1 3.6 - 11.0 K/uL  
 RBC 3.69 (L) 3.80 - 5.20 M/uL  
 HGB 12.6 11.5 - 16.0 g/dL HCT 37.4 35.0 - 47.0 % .4 (H) 80.0 - 99.0 FL  
 MCH 34.1 (H) 26.0 - 34.0 PG  
 MCHC 33.7 30.0 - 36.5 g/dL  
 RDW 13.9 11.5 - 14.5 % PLATELET 714 (L) 125 - 400 K/uL MPV 11.8 8.9 - 12.9 FL  
 NRBC 0.3 (H) 0  WBC ABSOLUTE NRBC 0.02 (H) 0.00 - 0.01 K/uL NEUTROPHILS 72 32 - 75 % LYMPHOCYTES 21 12 - 49 % MONOCYTES 6 5 - 13 % EOSINOPHILS 1 0 - 7 % BASOPHILS 0 0 - 1 % IMMATURE GRANULOCYTES 0 0.0 - 0.5 % ABS. NEUTROPHILS 5.1 1.8 - 8.0 K/UL  
 ABS. LYMPHOCYTES 1.5 0.8 - 3.5 K/UL  
 ABS. MONOCYTES 0.4 0.0 - 1.0 K/UL  
 ABS. EOSINOPHILS 0.1 0.0 - 0.4 K/UL  
 ABS. BASOPHILS 0.0 0.0 - 0.1 K/UL  
 ABS. IMM. GRANS. 0.0 0.00 - 0.04 K/UL  
 DF AUTOMATED METABOLIC PANEL, COMPREHENSIVE Collection Time: 05/06/19  3:36 PM  
Result Value Ref Range Sodium 138 136 - 145 mmol/L Potassium 4.8 3.5 - 5.1 mmol/L Chloride 103 97 - 108 mmol/L  
 CO2 27 21 - 32 mmol/L Anion gap 8 5 - 15 mmol/L Glucose 100 65 - 100 mg/dL BUN 49 (H) 6 - 20 MG/DL Creatinine 1.85 (H) 0.55 - 1.02 MG/DL  
 BUN/Creatinine ratio 26 (H) 12 - 20 GFR est AA 31 (L) >60 ml/min/1.73m2 GFR est non-AA 26 (L) >60 ml/min/1.73m2 Calcium 10.3 (H) 8.5 - 10.1 MG/DL Bilirubin, total 0.5 0.2 - 1.0 MG/DL  
 ALT (SGPT) 121 (H) 12 - 78 U/L  
 AST (SGOT) 182 (H) 15 - 37 U/L Alk. phosphatase 145 (H) 45 - 117 U/L Protein, total 9.6 (H) 6.4 - 8.2 g/dL Albumin 4.1 3.5 - 5.0 g/dL Globulin 5.5 (H) 2.0 - 4.0 g/dL A-G Ratio 0.7 (L) 1.1 - 2.2 MAGNESIUM Collection Time: 05/06/19  3:36 PM  
Result Value Ref Range Magnesium 2.6 (H) 1.6 - 2.4 mg/dL METABOLIC PANEL, BASIC Collection Time: 05/06/19  7:16 PM  
Result Value Ref Range Sodium 139 136 - 145 mmol/L Potassium 4.1 3.5 - 5.1 mmol/L Chloride 108 97 - 108 mmol/L  
 CO2 22 21 - 32 mmol/L Anion gap 9 5 - 15 mmol/L Glucose 79 65 - 100 mg/dL BUN 47 (H) 6 - 20 MG/DL Creatinine 1.54 (H) 0.55 - 1.02 MG/DL  
 BUN/Creatinine ratio 31 (H) 12 - 20 GFR est AA 38 (L) >60 ml/min/1.73m2 GFR est non-AA 32 (L) >60 ml/min/1.73m2 Calcium 9.2 8.5 - 10.1 MG/DL URINALYSIS W/ REFLEX CULTURE Collection Time: 05/06/19  7:58 PM  
Result Value Ref Range Color YELLOW/STRAW Appearance CLOUDY (A) CLEAR Specific gravity 1.022 1.003 - 1.030    
 pH (UA) 5.0 5.0 - 8.0 Protein NEGATIVE  NEG mg/dL Glucose NEGATIVE  NEG mg/dL Ketone TRACE (A) NEG mg/dL Bilirubin NEGATIVE  NEG Blood NEGATIVE  NEG Urobilinogen 0.2 0.2 - 1.0 EU/dL Nitrites NEGATIVE  NEG Leukocyte Esterase MODERATE (A) NEG    
 WBC 10-20 0 - 4 /hpf  
 RBC 0-5 0 - 5 /hpf Epithelial cells MODERATE (A) FEW /lpf Bacteria 2+ (A) NEG /hpf  
 UA:UC IF INDICATED URINE CULTURE ORDERED (A) CNI Mucus 1+ (A) NEG /lpf Hyaline cast 5-10 0 - 5 /lpf Radiologic Studies - No orders to display CT Results  (Last 48 hours) None CXR Results  (Last 48 hours) None Medical Decision Making I am the first provider for this patient. I reviewed the vital signs, available nursing notes, past medical history, past surgical history, family history and social history. Vital Signs-Reviewed the patient's vital signs. Patient Vitals for the past 12 hrs: 
 Temp Pulse Resp BP SpO2  
05/06/19 2100    103/79 100 % 05/06/19 2000    112/78   
05/06/19 1930    110/79   
05/06/19 Abbie Costa 05/06/19 1830    116/56   
05/06/19 1800    117/50   
05/06/19 1730    112/63   
05/06/19 1630    95/61   
05/06/19 1600    102/64   
05/06/19 1530    100/61   
05/06/19 1303 98.1 °F (36.7 °C) 70 16 97/65 98 % Records Reviewed: Nursing Notes and Old Medical Records Provider Notes (Medical Decision Making):  
Patient presenting for anorexia. Likely related to dementia and end-of-life. Patient denies any pain and likely intra-abdominal pathology. Will get lab work to evaluate for dehydration or malnourishment. ED Course:  
Initial assessment performed. The patients presenting problems have been discussed, and they are in agreement with the care plan formulated and outlined with them. I have encouraged them to ask questions as they arise throughout their visit. ED Course as of May 06 2314 Mon May 06, 2019  
1719 Patient's lab work shows NADIA. Patient's blood pressure has also soft into the 80s. IV infiltrated so we will attempt another IV and give her fluids. After fluids will reassess if needs admission. [JS] 1900 Patient's blood pressures improved with fluids. Will repeat BMP after liter is completed [JS]  
2123 Patient's creatinine improved to 1.5. Blood pressures normalized. Her urine shows possible infection. We will send her home with antibiotics. [JS] ED Course User Index [JS] Jeanette Soares MD  
 
Critical Care Time:  
0 Disposition: 
Discharge Note: The patient has been re-evaluated and is ready for discharge. Reviewed available results with patient. Counseled patient on diagnosis and care plan. Patient has expressed understanding, and all questions have been answered. Patient agrees with plan and agrees to follow up as recommended, or to return to the ED if their symptoms worsen. Discharge instructions have been provided and explained to the patient, along with reasons to return to the ED. PLAN: 
1. Discharge Medication List as of 5/6/2019  9:27 PM  
  
START taking these medications Details  
cephALEXin (KEFLEX) 500 mg capsule Take 1 Cap by mouth four (4) times daily for 7 days. , Normal, Disp-28 Cap, R-0  
  
predniSONE (DELTASONE) 50 mg tablet Take 1 Tab by mouth daily for 3 days. , Normal, Disp-3 Tab, R-0  
  
  
CONTINUE these medications which have NOT CHANGED Details  
traMADol (ULTRAM) 50 mg tablet Take 0.5 Tabs by mouth every eight (8) hours as needed for Pain. Max Daily Amount: 75 mg., Print, Disp-15 Tab, R-0  
  
metoprolol succinate (TOPROL-XL) 25 mg XL tablet Take 1 Tab by mouth daily. , Print, Disp-30 Tab, R-3  
  
furosemide (LASIX) 20 mg tablet Take one tablet daily, Print, Disp-30 Tab, R-3  
  
omeprazole (PRILOSEC) 40 mg capsule Take 1 Cap by mouth daily. , Print, Disp-30 Cap, R-3  
  
nitroglycerin (NITROSTAT) 0.4 mg SL tablet 0.4 mg by SubLINGual route every five (5) minutes as needed for Chest Pain., Historical Med  
  
warfarin (COUMADIN) 5 mg tablet Take 5 mg by mouth daily. , Historical Med  
  
atorvastatin (LIPITOR) 20 mg tablet Take 20 mg by mouth daily. , Historical Med  
  
lisinopril (PRINIVIL, ZESTRIL) 5 mg tablet Take 5 mg by mouth daily. , Historical Med  
  
aspirin 81 mg chewable tablet Take 81 mg by mouth daily. , Historical Med 2. Follow-up Information Follow up With Specialties Details Why Contact Info Josette Castorena MD Family Practice  As needed [address] [place] [id number] 
[phone] 3. Return to ED if worse Diagnosis Clinical Impression: 1. Dehydration 2. NADIA (acute kidney injury) ([address] 3. Acute cystitis without hematuria 4. Anorexia Attestations: 
 
Jeremías Hernandez M.D. Please note that this dictation was completed with [employer] the [employer] voice recognition software.   Quite often unanticipated grammatical, syntax, homophones, and other interpretive errors are inadvertently transcribed by the computer software. Please disregard these errors. Please excuse any errors that have escaped final proofreading. Thank you.

## 2019-05-07 NOTE — DISCHARGE INSTRUCTIONS
Patient Education        Dehydration: Care Instructions  Your Care Instructions  Dehydration happens when your body loses too much fluid. This might happen when you do not drink enough water or you lose large amounts of fluids from your body because of diarrhea, vomiting, or sweating. Severe dehydration can be life-threatening. Water and minerals called electrolytes help put your body fluids back in balance. Learn the early signs of fluid loss, and drink more fluids to prevent dehydration. Follow-up care is a key part of your treatment and safety. Be sure to make and go to all appointments, and call your doctor if you are having problems. It's also a good idea to know your test results and keep a list of the medicines you take. How can you care for yourself at home? · To prevent dehydration, drink plenty of fluids, enough so that your urine is light yellow or clear like water. Choose water and other caffeine-free clear liquids until you feel better. If you have kidney, heart, or liver disease and have to limit fluids, talk with your doctor before you increase the amount of fluids you drink. · If you do not feel like eating or drinking, try taking small sips of water, sports drinks, or other rehydration drinks. · Get plenty of rest.  To prevent dehydration  · Add more fluids to your diet and daily routine, unless your doctor has told you not to. · During hot weather, drink more fluids. Drink even more fluids if you exercise a lot. Stay away from drinks with alcohol or caffeine. · Watch for the symptoms of dehydration. These include:  ? A dry, sticky mouth. ? Dark yellow urine, and not much of it. ? Dry and sunken eyes. ? Feeling very tired. · Learn what problems can lead to dehydration. These include:  ? Diarrhea, fever, and vomiting. ? Any illness with a fever, such as pneumonia or the flu. ?  Activities that cause heavy sweating, such as endurance races and heavy outdoor work in hot or humid weather. ? Alcohol or drug abuse or withdrawal.  ? Certain medicines, such as cold and allergy pills (antihistamines), diet pills (diuretics), and laxatives. ? Certain diseases, such as diabetes, cancer, and heart or kidney disease. When should you call for help? Call 911 anytime you think you may need emergency care. For example, call if:    · You passed out (lost consciousness).    Call your doctor now or seek immediate medical care if:    · You are confused and cannot think clearly.     · You are dizzy or lightheaded, or you feel like you may faint.     · You have signs of needing more fluids. You have sunken eyes and a dry mouth, and you pass only a little dark urine.     · You cannot keep fluids down.    Watch closely for changes in your health, and be sure to contact your doctor if:    · You are not making tears.     · Your skin is very dry and sags slowly back into place after you pinch it.     · Your mouth and eyes are very dry. Where can you learn more? Go to http://jamshid-hilda.info/. Enter D473 in the search box to learn more about \"Dehydration: Care Instructions. \"  Current as of: September 23, 2018  Content Version: 11.9  © 8960-6236 [employer]. Care instructions adapted under license by [employer] (which disclaims liability or warranty for this information). If you have questions about a medical condition or this instruction, always ask your healthcare professional. [place] [address] any warranty or liability for your use of this information.

## 2019-05-08 LAB
BACTERIA SPEC CULT: NORMAL
CC UR VC: NORMAL
SERVICE CMNT-IMP: NORMAL

## 2021-06-15 ENCOUNTER — APPOINTMENT (OUTPATIENT)
Dept: CT IMAGING | Age: 86
DRG: 418 | End: 2021-06-15
Attending: EMERGENCY MEDICINE
Payer: MEDICARE

## 2021-06-15 ENCOUNTER — APPOINTMENT (OUTPATIENT)
Dept: GENERAL RADIOLOGY | Age: 86
DRG: 418 | End: 2021-06-15
Attending: EMERGENCY MEDICINE
Payer: MEDICARE

## 2021-06-15 ENCOUNTER — HOSPITAL ENCOUNTER (INPATIENT)
Age: 86
LOS: 9 days | Discharge: HOME HEALTH | DRG: 418 | End: 2021-06-24
Attending: EMERGENCY MEDICINE | Admitting: INTERNAL MEDICINE
Payer: MEDICARE

## 2021-06-15 DIAGNOSIS — K80.50 CHOLEDOCHOLITHIASIS: Primary | ICD-10-CM

## 2021-06-15 DIAGNOSIS — R63.0 ANOREXIA: ICD-10-CM

## 2021-06-15 DIAGNOSIS — E86.0 DEHYDRATION: ICD-10-CM

## 2021-06-15 DIAGNOSIS — F03.90 DEMENTIA WITHOUT BEHAVIORAL DISTURBANCE, UNSPECIFIED DEMENTIA TYPE: ICD-10-CM

## 2021-06-15 DIAGNOSIS — R74.8 ELEVATED LIVER ENZYMES: ICD-10-CM

## 2021-06-15 DIAGNOSIS — R11.2 INTRACTABLE VOMITING WITH NAUSEA, UNSPECIFIED VOMITING TYPE: ICD-10-CM

## 2021-06-15 PROBLEM — K80.20 CHOLELITHIASES: Status: ACTIVE | Noted: 2021-06-15

## 2021-06-15 PROBLEM — E87.6 HYPOKALEMIA: Status: ACTIVE | Noted: 2021-06-15

## 2021-06-15 PROBLEM — N17.9 AKI (ACUTE KIDNEY INJURY) (HCC): Status: ACTIVE | Noted: 2021-06-15

## 2021-06-15 LAB
ALBUMIN SERPL-MCNC: 3.1 G/DL (ref 3.5–5)
ALBUMIN/GLOB SERPL: 0.5 {RATIO} (ref 1.1–2.2)
ALP SERPL-CCNC: 218 U/L (ref 45–117)
ALT SERPL-CCNC: 73 U/L (ref 12–78)
ANION GAP SERPL CALC-SCNC: 4 MMOL/L (ref 5–15)
APPEARANCE UR: CLEAR
AST SERPL-CCNC: 154 U/L (ref 15–37)
BACTERIA URNS QL MICRO: ABNORMAL /HPF
BASOPHILS # BLD: 0 K/UL (ref 0–0.1)
BASOPHILS NFR BLD: 0 % (ref 0–1)
BILIRUB SERPL-MCNC: 0.9 MG/DL (ref 0.2–1)
BILIRUB UR QL: NEGATIVE
BUN SERPL-MCNC: 21 MG/DL (ref 6–20)
BUN/CREAT SERPL: 16 (ref 12–20)
CALCIUM SERPL-MCNC: 9.5 MG/DL (ref 8.5–10.1)
CHLORIDE SERPL-SCNC: 88 MMOL/L (ref 97–108)
CO2 SERPL-SCNC: 41 MMOL/L (ref 21–32)
COLOR UR: ABNORMAL
CREAT SERPL-MCNC: 1.32 MG/DL (ref 0.55–1.02)
DIFFERENTIAL METHOD BLD: ABNORMAL
EOSINOPHIL # BLD: 0.1 K/UL (ref 0–0.4)
EOSINOPHIL NFR BLD: 1 % (ref 0–7)
EPITH CASTS URNS QL MICRO: ABNORMAL /LPF
ERYTHROCYTE [DISTWIDTH] IN BLOOD BY AUTOMATED COUNT: 14.2 % (ref 11.5–14.5)
GLOBULIN SER CALC-MCNC: 5.8 G/DL (ref 2–4)
GLUCOSE SERPL-MCNC: 137 MG/DL (ref 65–100)
GLUCOSE UR STRIP.AUTO-MCNC: NEGATIVE MG/DL
HCT VFR BLD AUTO: 41 % (ref 35–47)
HGB BLD-MCNC: 13.7 G/DL (ref 11.5–16)
HGB UR QL STRIP: NEGATIVE
HYALINE CASTS URNS QL MICRO: ABNORMAL /LPF (ref 0–5)
IMM GRANULOCYTES # BLD AUTO: 0 K/UL (ref 0–0.04)
IMM GRANULOCYTES NFR BLD AUTO: 0 % (ref 0–0.5)
KETONES UR QL STRIP.AUTO: NEGATIVE MG/DL
LACTATE BLD-SCNC: 2.2 MMOL/L (ref 0.4–2)
LEUKOCYTE ESTERASE UR QL STRIP.AUTO: ABNORMAL
LYMPHOCYTES # BLD: 1.6 K/UL (ref 0.8–3.5)
LYMPHOCYTES NFR BLD: 25 % (ref 12–49)
MCH RBC QN AUTO: 33.6 PG (ref 26–34)
MCHC RBC AUTO-ENTMCNC: 33.4 G/DL (ref 30–36.5)
MCV RBC AUTO: 100.5 FL (ref 80–99)
MONOCYTES # BLD: 0.5 K/UL (ref 0–1)
MONOCYTES NFR BLD: 8 % (ref 5–13)
NEUTS SEG # BLD: 4.1 K/UL (ref 1.8–8)
NEUTS SEG NFR BLD: 66 % (ref 32–75)
NITRITE UR QL STRIP.AUTO: NEGATIVE
NRBC # BLD: 0.03 K/UL (ref 0–0.01)
NRBC BLD-RTO: 0.5 PER 100 WBC
PH UR STRIP: 6 [PH] (ref 5–8)
PLATELET # BLD AUTO: 174 K/UL (ref 150–400)
PMV BLD AUTO: 11.1 FL (ref 8.9–12.9)
POTASSIUM SERPL-SCNC: 3.3 MMOL/L (ref 3.5–5.1)
PROT SERPL-MCNC: 8.9 G/DL (ref 6.4–8.2)
PROT UR STRIP-MCNC: NEGATIVE MG/DL
RBC # BLD AUTO: 4.08 M/UL (ref 3.8–5.2)
RBC #/AREA URNS HPF: ABNORMAL /HPF (ref 0–5)
SODIUM SERPL-SCNC: 133 MMOL/L (ref 136–145)
SP GR UR REFRACTOMETRY: 1 (ref 1–1.03)
TROPONIN I BLD-MCNC: <0.04 NG/ML (ref 0–0.08)
UA: UC IF INDICATED,UAUC: ABNORMAL
UROBILINOGEN UR QL STRIP.AUTO: 1 EU/DL (ref 0.2–1)
WBC # BLD AUTO: 6.4 K/UL (ref 3.6–11)
WBC URNS QL MICRO: ABNORMAL /HPF (ref 0–4)

## 2021-06-15 PROCEDURE — 70450 CT HEAD/BRAIN W/O DYE: CPT

## 2021-06-15 PROCEDURE — 83605 ASSAY OF LACTIC ACID: CPT

## 2021-06-15 PROCEDURE — 80053 COMPREHEN METABOLIC PANEL: CPT

## 2021-06-15 PROCEDURE — 93005 ELECTROCARDIOGRAM TRACING: CPT

## 2021-06-15 PROCEDURE — 99285 EMERGENCY DEPT VISIT HI MDM: CPT

## 2021-06-15 PROCEDURE — 81001 URINALYSIS AUTO W/SCOPE: CPT

## 2021-06-15 PROCEDURE — 85025 COMPLETE CBC W/AUTO DIFF WBC: CPT

## 2021-06-15 PROCEDURE — 65270000029 HC RM PRIVATE

## 2021-06-15 PROCEDURE — 96360 HYDRATION IV INFUSION INIT: CPT

## 2021-06-15 PROCEDURE — 74177 CT ABD & PELVIS W/CONTRAST: CPT

## 2021-06-15 PROCEDURE — 74011250636 HC RX REV CODE- 250/636: Performed by: EMERGENCY MEDICINE

## 2021-06-15 PROCEDURE — 84484 ASSAY OF TROPONIN QUANT: CPT

## 2021-06-15 PROCEDURE — 74011000636 HC RX REV CODE- 636: Performed by: EMERGENCY MEDICINE

## 2021-06-15 PROCEDURE — 71045 X-RAY EXAM CHEST 1 VIEW: CPT

## 2021-06-15 PROCEDURE — 36415 COLL VENOUS BLD VENIPUNCTURE: CPT

## 2021-06-15 RX ORDER — POTASSIUM CHLORIDE 14.9 MG/ML
10 INJECTION INTRAVENOUS ONCE
Status: DISCONTINUED | OUTPATIENT
Start: 2021-06-15 | End: 2021-06-16 | Stop reason: ALTCHOICE

## 2021-06-15 RX ORDER — SODIUM CHLORIDE 0.9 % (FLUSH) 0.9 %
5-40 SYRINGE (ML) INJECTION AS NEEDED
Status: DISCONTINUED | OUTPATIENT
Start: 2021-06-15 | End: 2021-06-23 | Stop reason: SDUPTHER

## 2021-06-15 RX ORDER — ONDANSETRON 2 MG/ML
4 INJECTION INTRAMUSCULAR; INTRAVENOUS
Status: DISCONTINUED | OUTPATIENT
Start: 2021-06-15 | End: 2021-06-24 | Stop reason: SDUPTHER

## 2021-06-15 RX ORDER — ONDANSETRON 2 MG/ML
4 INJECTION INTRAMUSCULAR; INTRAVENOUS
Status: DISCONTINUED | OUTPATIENT
Start: 2021-06-15 | End: 2021-06-24 | Stop reason: HOSPADM

## 2021-06-15 RX ORDER — POLYETHYLENE GLYCOL 3350 17 G/17G
17 POWDER, FOR SOLUTION ORAL DAILY PRN
Status: DISCONTINUED | OUTPATIENT
Start: 2021-06-15 | End: 2021-06-24 | Stop reason: HOSPADM

## 2021-06-15 RX ORDER — ONDANSETRON 4 MG/1
4 TABLET, ORALLY DISINTEGRATING ORAL
Status: DISCONTINUED | OUTPATIENT
Start: 2021-06-15 | End: 2021-06-24 | Stop reason: HOSPADM

## 2021-06-15 RX ORDER — SODIUM CHLORIDE 9 MG/ML
50 INJECTION, SOLUTION INTRAVENOUS CONTINUOUS
Status: DISCONTINUED | OUTPATIENT
Start: 2021-06-15 | End: 2021-06-24 | Stop reason: HOSPADM

## 2021-06-15 RX ORDER — ACETAMINOPHEN 650 MG/1
650 SUPPOSITORY RECTAL
Status: DISCONTINUED | OUTPATIENT
Start: 2021-06-15 | End: 2021-06-24 | Stop reason: HOSPADM

## 2021-06-15 RX ORDER — SODIUM CHLORIDE 0.9 % (FLUSH) 0.9 %
5-40 SYRINGE (ML) INJECTION EVERY 8 HOURS
Status: DISCONTINUED | OUTPATIENT
Start: 2021-06-15 | End: 2021-06-23 | Stop reason: SDUPTHER

## 2021-06-15 RX ORDER — ACETAMINOPHEN 325 MG/1
650 TABLET ORAL
Status: DISCONTINUED | OUTPATIENT
Start: 2021-06-15 | End: 2021-06-24 | Stop reason: HOSPADM

## 2021-06-15 RX ADMIN — IOPAMIDOL 100 ML: 755 INJECTION, SOLUTION INTRAVENOUS at 22:03

## 2021-06-15 RX ADMIN — Medication 10 ML: at 23:32

## 2021-06-15 RX ADMIN — SODIUM CHLORIDE 1000 ML: 9 INJECTION, SOLUTION INTRAVENOUS at 21:11

## 2021-06-16 ENCOUNTER — APPOINTMENT (OUTPATIENT)
Dept: MRI IMAGING | Age: 86
DRG: 418 | End: 2021-06-16
Attending: NURSE PRACTITIONER
Payer: MEDICARE

## 2021-06-16 LAB
ANION GAP SERPL CALC-SCNC: 3 MMOL/L (ref 5–15)
ATRIAL RATE: 68 BPM
BASOPHILS # BLD: 0 K/UL (ref 0–0.1)
BASOPHILS NFR BLD: 0 % (ref 0–1)
BUN SERPL-MCNC: 20 MG/DL (ref 6–20)
BUN/CREAT SERPL: 18 (ref 12–20)
CALCIUM SERPL-MCNC: 8.9 MG/DL (ref 8.5–10.1)
CALCULATED R AXIS, ECG10: -26 DEGREES
CALCULATED T AXIS, ECG11: 137 DEGREES
CHLORIDE SERPL-SCNC: 92 MMOL/L (ref 97–108)
CO2 SERPL-SCNC: 39 MMOL/L (ref 21–32)
CREAT SERPL-MCNC: 1.11 MG/DL (ref 0.55–1.02)
DIAGNOSIS, 93000: NORMAL
DIFFERENTIAL METHOD BLD: ABNORMAL
EOSINOPHIL # BLD: 0.1 K/UL (ref 0–0.4)
EOSINOPHIL NFR BLD: 2 % (ref 0–7)
ERYTHROCYTE [DISTWIDTH] IN BLOOD BY AUTOMATED COUNT: 14.2 % (ref 11.5–14.5)
GLUCOSE SERPL-MCNC: 78 MG/DL (ref 65–100)
HCT VFR BLD AUTO: 39.2 % (ref 35–47)
HGB BLD-MCNC: 13.2 G/DL (ref 11.5–16)
IMM GRANULOCYTES # BLD AUTO: 0 K/UL (ref 0–0.04)
IMM GRANULOCYTES NFR BLD AUTO: 0 % (ref 0–0.5)
INR PPP: 1.1 (ref 0.9–1.1)
INR PPP: 1.1 (ref 0.9–1.1)
LACTATE SERPL-SCNC: 1.4 MMOL/L (ref 0.4–2)
LYMPHOCYTES # BLD: 1.8 K/UL (ref 0.8–3.5)
LYMPHOCYTES NFR BLD: 31 % (ref 12–49)
MCH RBC QN AUTO: 33.7 PG (ref 26–34)
MCHC RBC AUTO-ENTMCNC: 33.7 G/DL (ref 30–36.5)
MCV RBC AUTO: 100 FL (ref 80–99)
MONOCYTES # BLD: 0.4 K/UL (ref 0–1)
MONOCYTES NFR BLD: 6 % (ref 5–13)
NEUTS SEG # BLD: 3.6 K/UL (ref 1.8–8)
NEUTS SEG NFR BLD: 61 % (ref 32–75)
NRBC # BLD: 0.02 K/UL (ref 0–0.01)
NRBC BLD-RTO: 0.3 PER 100 WBC
PLATELET # BLD AUTO: 155 K/UL (ref 150–400)
PMV BLD AUTO: 10.8 FL (ref 8.9–12.9)
POTASSIUM SERPL-SCNC: 3 MMOL/L (ref 3.5–5.1)
PROTHROMBIN TIME: 11.8 SEC (ref 9–11.1)
PROTHROMBIN TIME: 11.9 SEC (ref 9–11.1)
Q-T INTERVAL, ECG07: 434 MS
QRS DURATION, ECG06: 90 MS
QTC CALCULATION (BEZET), ECG08: 454 MS
RBC # BLD AUTO: 3.92 M/UL (ref 3.8–5.2)
SODIUM SERPL-SCNC: 134 MMOL/L (ref 136–145)
VENTRICULAR RATE, ECG03: 66 BPM
WBC # BLD AUTO: 5.9 K/UL (ref 3.6–11)

## 2021-06-16 PROCEDURE — 85610 PROTHROMBIN TIME: CPT

## 2021-06-16 PROCEDURE — 65270000029 HC RM PRIVATE

## 2021-06-16 PROCEDURE — 83605 ASSAY OF LACTIC ACID: CPT

## 2021-06-16 PROCEDURE — 74011000250 HC RX REV CODE- 250: Performed by: HOSPITALIST

## 2021-06-16 PROCEDURE — 80048 BASIC METABOLIC PNL TOTAL CA: CPT

## 2021-06-16 PROCEDURE — 74011250637 HC RX REV CODE- 250/637: Performed by: INTERNAL MEDICINE

## 2021-06-16 PROCEDURE — 36415 COLL VENOUS BLD VENIPUNCTURE: CPT

## 2021-06-16 PROCEDURE — 74011250636 HC RX REV CODE- 250/636: Performed by: HOSPITALIST

## 2021-06-16 PROCEDURE — 85025 COMPLETE CBC W/AUTO DIFF WBC: CPT

## 2021-06-16 RX ORDER — ZIPRASIDONE MESYLATE 20 MG/ML
10 INJECTION, POWDER, LYOPHILIZED, FOR SOLUTION INTRAMUSCULAR
Status: DISCONTINUED | OUTPATIENT
Start: 2021-06-16 | End: 2021-06-16

## 2021-06-16 RX ORDER — LORAZEPAM 0.5 MG/1
0.5 TABLET ORAL ONCE
Status: COMPLETED | OUTPATIENT
Start: 2021-06-16 | End: 2021-06-16

## 2021-06-16 RX ORDER — POTASSIUM CHLORIDE 7.45 MG/ML
10 INJECTION INTRAVENOUS
Status: COMPLETED | OUTPATIENT
Start: 2021-06-16 | End: 2021-06-16

## 2021-06-16 RX ADMIN — LORAZEPAM 0.5 MG: 0.5 TABLET ORAL at 15:14

## 2021-06-16 RX ADMIN — Medication 10 ML: at 06:39

## 2021-06-16 RX ADMIN — POTASSIUM CHLORIDE 10 MEQ: 7.46 INJECTION, SOLUTION INTRAVENOUS at 00:33

## 2021-06-16 RX ADMIN — SODIUM CHLORIDE 50 ML/HR: 9 INJECTION, SOLUTION INTRAVENOUS at 00:30

## 2021-06-16 RX ADMIN — FAMOTIDINE 20 MG: 10 INJECTION, SOLUTION INTRAVENOUS at 09:15

## 2021-06-16 NOTE — PROGRESS NOTES
MRI PENDING      MRI SCREENING SHEET NEEDS TO BE COMPLETED AND SIGNED      CALL [phone] WHEN THIS IS DONE    FAX [phone]

## 2021-06-16 NOTE — ED NOTES
TRANSFER - OUT REPORT:    Verbal report given to Sai RN (name) on Tiffany Lam  being transferred to Gen-Surg (unit) for routine progression of care       Report consisted of patients Situation, Background, Assessment and   Recommendations(SBAR). Information from the following report(s) SBAR, ED Summary, [facility] and Recent Results was reviewed with the receiving nurse. Lines:   Peripheral IV 06/15/21 Anterior; Left Hand (Active)       Peripheral IV 06/16/21 Right Antecubital (Active)        Opportunity for questions and clarification was provided.

## 2021-06-16 NOTE — INTERDISCIPLINARY ROUNDS
Interdisciplinary Rounds were completed on 06/16/21 for this patient. Rounds included nursing, clinical care leader, pharmacy, and case management. Plan of care discussed. See clinical pathway and/or care plan for interventions and desired outcomes.

## 2021-06-16 NOTE — ED NOTES
21:00: Assumed care of patient at this time, Placed on monitor x 3. SR x 2. Family at bedside.     21:28: Pt taken to CT

## 2021-06-16 NOTE — PROGRESS NOTES
Patient assessed for PIV placement. Patient has had multiple unsuccessful IVs previously. The patient was found to be confused and difficult to redirect. Patient was able to get into bed for assessment. Both upper extremities were evaluated for IV placement. IV was first attempted on the right forearm under USG. Patient was uncooperative with placement, frequently jerking her arm away and attempting to get out of bed. Attempt #1 was unsuccessful. The left arm was then evaluated using USG. Second attempt was made on left forearm, but again, patient was uncooperative with insertion, grabbing at needle and attempting to get out of bed. Attempt #2 was unsuccessful. Discussed with patient the need for line, but she refused further attempts at this time. Upper arm veins were evaluated for possible midline placement, but veins were of inadequate size. Additionally, at this time, patient would not be able to tolerate procedure without the assistance of additional staff members due to agitation and confusion. Bed low, wheels locked, call bell in reach, bed alarm active. Patient's nurse was notified that we were unable to obtain IV access and that the patient's bed alarm was on.

## 2021-06-16 NOTE — PROGRESS NOTES
Hospitalist Progress Note    NAME: Billy Holder   :  3/16/1930   MRN:  [id number]     Interim Hospital Summary: 80 y.o. female whom presented on 6/15/2021 with      Assessment / Plan:    Cholelithiasis with common bile duct dilatation  Anorexia and weight loss  -CT abdomen  1. Cholelithiasis, with dilated common bile duct 12 mm. Choledocholithiasis as  well is irregular wall thickening of the distal common bile duct. Cannot exclude  cholangiocarcinoma. 2.  Uterine fibroids. -MRCP pending. Appreciate GI input    Atrial fibrillation, probably chronic  CAD  -Holding aspirin and Coumadin in anticipation of possible need for procedures  -Lisinopril, Lasix held due to soft blood pressure. Will restart metoprolol    Dementia    25.0 - 29.9 Overweight / Body mass index is 28.67 kg/m². Estimated discharge date:   Barriers:    Code status: Full  Prophylaxis: SCD's  Recommended Disposition:  PT, OT, RN       Subjective:     Chief Complaint / Reason for Physician Visit  Follow up of gallstones, anorexia, dilated common bile duct, A. fib  Chart reviewed in detail. Discussed with RN events overnight. Review of Systems:  Symptom Y/N Comments  Symptom Y/N Comments   Fever/Chills    Chest Pain     Poor Appetite    Edema     Cough    Abdominal Pain     Sputum    Joint Pain     SOB/YANES    Pruritis/Rash     Nausea/vomit    Tolerating PT/OT     Diarrhea    Tolerating Diet     Constipation    Other       Could NOT obtain due to:      PO intake: No data found. Objective:     VITALS:   Last 24hrs VS reviewed since prior progress note.  Most recent are:  Patient Vitals for the past 24 hrs:   Temp Pulse Resp BP SpO2   21 1430 98.6 °F (37 °C) 75 18 110/60 94 %   21 1036 98.4 °F (36.9 °C) 72 18 (!) 108/55 92 %   21 0718 98.2 °F (36.8 °C) 78 17 (!) 105/50 90 %   21 0207 97.6 °F (36.4 °C) 70 16 (!) 119/48 95 %   21 0045  71 18 102/74 97 %   06/16/21 0001 97.7 °F (36.5 °C) 69 16 (!) 112/48 98 %   06/15/21 2331  74 21 124/71 95 %   06/15/21 2300  73  128/72 96 %   06/15/21 1831 97.7 °F (36.5 °C) 77 16 114/68 98 %       Intake/Output Summary (Last 24 hours) at 6/16/2021 1539  Last data filed at 6/16/2021 4424  Gross per 24 hour   Intake 350 ml   Output    Net 350 ml        I had a face to face encounter, and independently examined this patient on 6/16/2021, as outlined below:  PHYSICAL EXAM:  General: WD, WN. Alert, cooperative, no acute distress    EENT:  EOMI. Anicteric sclerae. MMM  Resp:  CTA bilaterally, no wheezing or rales. No accessory muscle use  CV:  Regular  rhythm,  No edema  GI:  Soft, Non distended, Non tender. +Bowel sounds  Neurologic:  Alert and oriented X 3, normal speech,   Psych:   Good insight. Not anxious nor agitated  Skin:  No rashes. No jaundice    Reviewed most current lab test results and cultures  YES  Reviewed most current radiology test results   YES  Review and summation of old records today    NO  Reviewed patient's current orders and MAR    YES  PMH/SH reviewed - no change compared to H&P  ________________________________________________________________________  Care Plan discussed with:    Comments   Patient x    Family      RN     Care Manager     Consultant                        Multidiciplinary team rounds were held today with , nursing, pharmacist and clinical coordinator. Patient's plan of care was discussed; medications were reviewed and discharge planning was addressed.      ________________________________________________________________________  Total NON critical care TIME:  25    Minutes    Total CRITICAL CARE TIME Spent:   Minutes non procedure based      Comments   >50% of visit spent in counseling and coordination of care x     This includes time during multidisciplinary rounds if indicated above   ________________________________________________________________________  Sade Weller Kelvin Cuellar MD     Procedures: see electronic medical records for all procedures/Xrays and details which were not copied into this note but were reviewed prior to creation of Plan. LABS:  I reviewed today's most current labs and imaging studies.   Pertinent labs include:  Recent Labs     06/16/21  0146 06/15/21  1842   WBC 5.9 6.4   HGB 13.2 13.7   HCT 39.2 41.0    174     Recent Labs     06/16/21  0320 06/16/21  0038 06/15/21  1842   *  --  133*   K 3.0*  --  3.3*   CL 92*  --  88*   CO2 39*  --  41*   GLU 78  --  137*   BUN 20  --  21*   CREA 1.11*  --  1.32*   CA 8.9  --  9.5   ALB  --   --  3.1*   TBILI  --   --  0.9   ALT  --   --  73   INR 1.1 1.1  --

## 2021-06-16 NOTE — ED NOTES
2300: Patient up to bedside commode with assistance. Patient returned to stretcher. SR x 2. Call bell in reach. 2353: Bedside shift change report given to Carolyn Cowart RN (oncoming nurse) by Naina Hooks RN (offgoing nurse). Report included the following information SBAR, Kardex, ED Summary, Intake/Output, MAR and Recent Results.

## 2021-06-16 NOTE — ED PROVIDER NOTES
EMERGENCY DEPARTMENT HISTORY AND PHYSICAL EXAM     ----------------------------------------------------------------------------  Please note that this dictation was completed with [employer] the [employer] voice recognition software. Quite often unanticipated grammatical, syntax, homophones, and other interpretive errors are inadvertently transcribed by the computer software. Please disregard these errors. Please excuse any errors that have escaped final proofreading  ----------------------------------------------------------------------------      Date: 6/15/2021  Patient Name: Jennifer Camarillo    History of Presenting Illness     Chief Complaint   Patient presents with    Fatigue     by EMS from home. She has 6/10 headache pain x 2 months with increased weakness with no appetite x 1 month    Headache       History Provided By:  Patient, and family    HPI: Jennifer Camarillo is a 80 y.o. female, with significant pmhx of hypertension, CVA, CAD, dementia, previous breast cancer, reflux, left upper legs presents via EMS  to the ED with report of \"not having eaten anything for the last month. \"  Family reports that she was seen by her primary care doctor earlier today. Was able to ambulate minimally with walker assistance at the office today but upon returning home was unable to lift up her leg and off to ambulate for 3 steps into her house. Patient family notes that every time that she goes to eat or drink anything she vomits. Patient also complains of frontal headache without visual disturbance that has been ongoing for several weeks, and family is unsure when her last bowel movement was. Family notes that she has recurrent falls as well. Other than her headache patient has no complaints.   Patient  specifically denies any associated fevers, chills, CP, SOB,  diarrhea, abd pain, urinary sxs,    Social Hx: denies tobacco  denies EtOH , denies Illicit Drugs    There are no other complaints, changes, or physical findings at this time. PCP: Mena Bran MD    No Known Allergies    Current Facility-Administered Medications   Medication Dose Route Frequency Provider Last Rate Last Admin    ondansetron (ZOFRAN) injection 4 mg  4 mg IntraVENous Q1H PRN Kasey Way MD        sodium chloride (NS) flush 5-40 mL  5-40 mL IntraVENous Q8H Din, Alix Landa MD   10 mL at 06/16/21 6448    sodium chloride (NS) flush 5-40 mL  5-40 mL IntraVENous PRN Michelle, Alix Landa MD        acetaminophen (TYLENOL) tablet 650 mg  650 mg Oral Q6H PRN Din, Alix Landa MD        Or    acetaminophen (TYLENOL) suppository 650 mg  650 mg Rectal Q6H PRN Din, Alix Landa MD        polyethylene glycol (MIRALAX) packet 17 g  17 g Oral DAILY PRN Michelle, Alix Landa MD        ondansetron (ZOFRAN ODT) tablet 4 mg  4 mg Oral Q8H PRN Michelle, Alix Landa MD        Or    ondansetron (ZOFRAN) injection 4 mg  4 mg IntraVENous Q6H PRN Din, Alix Landa MD        famotidine (PF) (PEPCID) 20 mg in 0.9% sodium chloride 10 mL injection  20 mg IntraVENous BID Michelle, Cheng POSEY MD        0.9% sodium chloride infusion  50 mL/hr IntraVENous CONTINUOUS Din, Alix Landa MD 50 mL/hr at 06/16/21 0030 50 mL/hr at 06/16/21 0030       Past History     Past Medical History:  Past Medical History:   Diagnosis Date    Acid reflux     Arthritis     CAD (coronary artery disease)     MI, no cardiologist at this time    Cancer [facility])     breast cancer    Dementia ([facility] [address]     Hypertension     Stroke ([facility] [address]     approx 15 years prior per daughter (2000)       Past Surgical History:  Past Surgical History:   Procedure Laterality Date    HX GYN      x6 vaginal births    AL BREAST SURGERY PROCEDURE UNLISTED      right breast removal due to cancer    AL CARDIAC SURG PROCEDURE UNLIST      x1 stent placed       Family History:  History reviewed. No pertinent family history.     Social History:  Social History     Tobacco Use    Smoking status: Never Smoker    Smokeless tobacco: Never Used Vaping Use    Vaping Use: Never used   Substance Use Topics    Alcohol use: No    Drug use: Never       Allergies:  No Known Allergies      Review of Systems   Review of Systems   Constitutional: Negative. Negative for fever. Eyes: Negative. Respiratory: Negative. Negative for shortness of breath. Cardiovascular: Negative for chest pain. Gastrointestinal: Positive for nausea and vomiting. Negative for abdominal pain. Endocrine: Negative. Genitourinary: Negative. Negative for difficulty urinating, dysuria and hematuria. Musculoskeletal: Negative. Skin: Negative. Neurological: Positive for headaches. Psychiatric/Behavioral: Negative for suicidal ideas. All other systems reviewed and are negative. Physical Exam   Physical Exam  Vitals and nursing note reviewed. Constitutional:       General: She is not in acute distress. Appearance: She is well-developed. She is not diaphoretic. HENT:      Head: Normocephalic and atraumatic. Nose: Nose normal.   Eyes:      General: No scleral icterus. Conjunctiva/sclera: Conjunctivae normal.   Neck:      Trachea: No tracheal deviation. Cardiovascular:      Rate and Rhythm: Normal rate and regular rhythm. Heart sounds: Normal heart sounds. No murmur heard. No friction rub. Pulmonary:      Effort: Pulmonary effort is normal. No respiratory distress. Breath sounds: Normal breath sounds. No stridor. No wheezing or rales. Abdominal:      General: Bowel sounds are normal. There is no distension. Palpations: Abdomen is soft. Tenderness: There is no abdominal tenderness. There is no rebound. Musculoskeletal:         General: No tenderness. Normal range of motion. Cervical back: Normal range of motion. Skin:     General: Skin is warm and dry. Findings: No rash. Neurological:      Mental Status: She is alert and oriented to person, place, and time. Cranial Nerves: No cranial nerve deficit. Psychiatric:         Speech: Speech normal.         Behavior: Behavior normal.         Thought Content: Thought content normal.         Judgment: Judgment normal.           Diagnostic Study Results     Labs -     Recent Results (from the past 12 hour(s))   CBC WITH AUTOMATED DIFF    Collection Time: 06/15/21  6:42 PM   Result Value Ref Range    WBC 6.4 3.6 - 11.0 K/uL    RBC 4.08 3.80 - 5.20 M/uL    HGB 13.7 11.5 - 16.0 g/dL    HCT 41.0 35.0 - 47.0 %    .5 (H) 80.0 - 99.0 FL    MCH 33.6 26.0 - 34.0 PG    MCHC 33.4 30.0 - 36.5 g/dL    RDW 14.2 11.5 - 14.5 %    PLATELET 951 288 - 781 K/uL    MPV 11.1 8.9 - 12.9 FL    NRBC 0.5 (H) 0  WBC    ABSOLUTE NRBC 0.03 (H) 0.00 - 0.01 K/uL    NEUTROPHILS 66 32 - 75 %    LYMPHOCYTES 25 12 - 49 %    MONOCYTES 8 5 - 13 %    EOSINOPHILS 1 0 - 7 %    BASOPHILS 0 0 - 1 %    IMMATURE GRANULOCYTES 0 0.0 - 0.5 %    ABS. NEUTROPHILS 4.1 1.8 - 8.0 K/UL    ABS. LYMPHOCYTES 1.6 0.8 - 3.5 K/UL    ABS. MONOCYTES 0.5 0.0 - 1.0 K/UL    ABS. EOSINOPHILS 0.1 0.0 - 0.4 K/UL    ABS. BASOPHILS 0.0 0.0 - 0.1 K/UL    ABS. IMM. GRANS. 0.0 0.00 - 0.04 K/UL    DF AUTOMATED     METABOLIC PANEL, COMPREHENSIVE    Collection Time: 06/15/21  6:42 PM   Result Value Ref Range    Sodium 133 (L) 136 - 145 mmol/L    Potassium 3.3 (L) 3.5 - 5.1 mmol/L    Chloride 88 (L) 97 - 108 mmol/L    CO2 41 (HH) 21 - 32 mmol/L    Anion gap 4 (L) 5 - 15 mmol/L    Glucose 137 (H) 65 - 100 mg/dL    BUN 21 (H) 6 - 20 MG/DL    Creatinine 1.32 (H) 0.55 - 1.02 MG/DL    BUN/Creatinine ratio 16 12 - 20      GFR est AA 46 (L) >60 ml/min/1.73m2    GFR est non-AA 38 (L) >60 ml/min/1.73m2    Calcium 9.5 8.5 - 10.1 MG/DL    Bilirubin, total 0.9 0.2 - 1.0 MG/DL    ALT (SGPT) 73 12 - 78 U/L    AST (SGOT) 154 (H) 15 - 37 U/L    Alk.  phosphatase 218 (H) 45 - 117 U/L    Protein, total 8.9 (H) 6.4 - 8.2 g/dL    Albumin 3.1 (L) 3.5 - 5.0 g/dL    Globulin 5.8 (H) 2.0 - 4.0 g/dL    A-G Ratio 0.5 (L) 1.1 - 2.2     EKG, 12 LEAD, INITIAL Collection Time: 06/15/21  9:20 PM   Result Value Ref Range    Ventricular Rate 66 BPM    Atrial Rate 68 BPM    QRS Duration 90 ms    Q-T Interval 434 ms    QTC Calculation (Bezet) 454 ms    Calculated R Axis -26 degrees    Calculated T Axis 137 degrees    Diagnosis       Atrial fibrillation  Septal infarct (cited on or before 15-BERT-2021)  T wave abnormality, consider lateral ischemia  When compared with ECG of 20-DEC-2018 14:55,  Vent.  rate has decreased BY  36 BPM  Incomplete right bundle branch block is no longer present  Questionable change in initial forces of Septal leads     POC TROPONIN-I    Collection Time: 06/15/21  9:21 PM   Result Value Ref Range    Troponin-I (POC) <0.04 0.00 - 0.08 ng/mL   POC LACTIC ACID    Collection Time: 06/15/21  9:30 PM   Result Value Ref Range    Lactic Acid (POC) 2.20 (HH) 0.40 - 2.00 mmol/L   URINALYSIS W/ REFLEX CULTURE    Collection Time: 06/15/21 11:18 PM    Specimen: Urine   Result Value Ref Range    Color YELLOW/STRAW      Appearance CLEAR CLEAR      Specific gravity 1.005 1.003 - 1.030      pH (UA) 6.0 5.0 - 8.0      Protein Negative NEG mg/dL    Glucose Negative NEG mg/dL    Ketone Negative NEG mg/dL    Bilirubin Negative NEG      Blood Negative NEG      Urobilinogen 1.0 0.2 - 1.0 EU/dL    Nitrites Negative NEG      Leukocyte Esterase SMALL (A) NEG      WBC 5-10 0 - 4 /hpf    RBC 0-5 0 - 5 /hpf    Epithelial cells FEW FEW /lpf    Bacteria 1+ (A) NEG /hpf    UA:UC IF INDICATED CULTURE NOT INDICATED BY UA RESULT CNI      Hyaline cast 0-2 0 - 5 /lpf   PROTHROMBIN TIME + INR    Collection Time: 06/16/21 12:38 AM   Result Value Ref Range    INR 1.1 0.9 - 1.1      Prothrombin time 11.8 (H) 9.0 - 11.1 sec   LACTIC ACID    Collection Time: 06/16/21  1:46 AM   Result Value Ref Range    Lactic acid 1.4 0.4 - 2.0 MMOL/L   CBC WITH AUTOMATED DIFF    Collection Time: 06/16/21  1:46 AM   Result Value Ref Range    WBC 5.9 3.6 - 11.0 K/uL    RBC 3.92 3.80 - 5.20 M/uL    HGB 13.2 11.5 - 16.0 g/dL    HCT 39.2 35.0 - 47.0 %    .0 (H) 80.0 - 99.0 FL    MCH 33.7 26.0 - 34.0 PG    MCHC 33.7 30.0 - 36.5 g/dL    RDW 14.2 11.5 - 14.5 %    PLATELET 569 498 - 678 K/uL    MPV 10.8 8.9 - 12.9 FL    NRBC 0.3 (H) 0  WBC    ABSOLUTE NRBC 0.02 (H) 0.00 - 0.01 K/uL    NEUTROPHILS 61 32 - 75 %    LYMPHOCYTES 31 12 - 49 %    MONOCYTES 6 5 - 13 %    EOSINOPHILS 2 0 - 7 %    BASOPHILS 0 0 - 1 %    IMMATURE GRANULOCYTES 0 0.0 - 0.5 %    ABS. NEUTROPHILS 3.6 1.8 - 8.0 K/UL    ABS. LYMPHOCYTES 1.8 0.8 - 3.5 K/UL    ABS. MONOCYTES 0.4 0.0 - 1.0 K/UL    ABS. EOSINOPHILS 0.1 0.0 - 0.4 K/UL    ABS. BASOPHILS 0.0 0.0 - 0.1 K/UL    ABS. IMM. GRANS. 0.0 0.00 - 0.04 K/UL    DF AUTOMATED     METABOLIC PANEL, BASIC    Collection Time: 06/16/21  3:20 AM   Result Value Ref Range    Sodium 134 (L) 136 - 145 mmol/L    Potassium 3.0 (L) 3.5 - 5.1 mmol/L    Chloride 92 (L) 97 - 108 mmol/L    CO2 39 (H) 21 - 32 mmol/L    Anion gap 3 (L) 5 - 15 mmol/L    Glucose 78 65 - 100 mg/dL    BUN 20 6 - 20 MG/DL    Creatinine 1.11 (H) 0.55 - 1.02 MG/DL    BUN/Creatinine ratio 18 12 - 20      GFR est AA 56 (L) >60 ml/min/1.73m2    GFR est non-AA 46 (L) >60 ml/min/1.73m2    Calcium 8.9 8.5 - 10.1 MG/DL   PROTHROMBIN TIME + INR    Collection Time: 06/16/21  3:20 AM   Result Value Ref Range    INR 1.1 0.9 - 1.1      Prothrombin time 11.9 (H) 9.0 - 11.1 sec       Radiologic Studies -   CT HEAD WO CONT   Final Result   No acute intracranial hemorrhage or infarct. CT ABD PELV W CONT   Final Result      1. Cholelithiasis, with dilated common bile duct 12 mm. Choledocholithiasis as   well is irregular wall thickening of the distal common bile duct. Cannot exclude   cholangiocarcinoma. 2.  Uterine fibroids. XR CHEST PORT   Final Result   No acute findings. CT Results  (Last 48 hours)               06/15/21 2203  CT HEAD WO CONT Final result    Impression:  No acute intracranial hemorrhage or infarct. Narrative:  INDICATION: Fatigue, headache, recurrent falls. Exam: Noncontrast CT of the brain is performed with 5 mm collimation. CT dose reduction was achieved to the use of a standardized protocol tailored   for this examination and automatic exposure control for dose modulation. FINDINGS: There is no acute intracranial hemorrhage, mass, mass effect or   herniation. There is age-appropriate diffuse cortical atrophy with ex vacuo   dilatation of the ventricular system. There are periventricular hypodensities   consistent with chronic microvascular ischemic changes. There is no evidence of   acute territorial infarct. The mastoid air cells are well pneumatized. The   visualized paranasal sinuses are normal.           06/15/21 2203  CT ABD PELV W CONT Final result    Impression:      1. Cholelithiasis, with dilated common bile duct 12 mm. Choledocholithiasis as   well is irregular wall thickening of the distal common bile duct. Cannot exclude   cholangiocarcinoma. 2.  Uterine fibroids. Narrative:  INDICATION: n/v x 1 mo       COMPARISON: December 20, 2018       TECHNIQUE:   Following the uneventful intravenous administration of IV contrast, thin axial   images were obtained through the abdomen and pelvis. Coronal and sagittal   reconstructions were generated. Oral contrast was not administered. CT dose   reduction was achieved through use of a standardized protocol tailored for this   examination and automatic exposure control for dose modulation. FINDINGS:   LUNG BASES: No abnormality. LIVER: 19 mm left hepatic cyst.   GALLBLADDER: Multiple gallstones and sludge. Dilated common bile duct up to 12   mm with several small filling defects in the mid and distal common bile duct, as   well as some irregular wall thickening of the distal duct (coronal image 40. SPLEEN: No enlargement or lesion. PANCREAS: No mass or ductal dilatation. ADRENALS: No mass.    KIDNEYS: Left kidney smaller than the right. No hydronephrosis. Right renal   cysts measuring up to 2 cm. GI TRACT: No bowel obstruction. Difficult to assess bowel wall thickening given   lack of oral contrast material.   PERITONEUM: No free air or free fluid. APPENDIX: Unremarkable. RETROPERITONEUM: No aortic aneurysm. LYMPH NODES: None enlarged. ADDITIONAL COMMENTS: N/A.       URINARY BLADDER: Unremarkable. REPRODUCTIVE ORGANS: Multiple uterine fibroids. LYMPH NODES: None enlarged. FREE FLUID: None. BONES: No destructive bone lesion. ADDITIONAL COMMENTS: N/A. CXR Results  (Last 48 hours)               06/15/21 2024  XR CHEST PORT Final result    Impression:  No acute findings. Narrative:  EXAM: XR CHEST PORT       INDICATION: fatigue       COMPARISON: 12/20/2018       FINDINGS: A portable AP radiograph of the chest was obtained at 2010 hours. There is no pneumothorax or pleural effusion. The lungs are clear. Moderate   right-sided cardiac contour enlargement is unchanged. No mediastinal or hilar   enlargement is evident. Medical Decision Making   I am the first provider for this patient. I reviewed the vital signs, available nursing notes, past medical history, past surgical history, family history and social history. Vital Signs-Reviewed the patient's vital signs.   Patient Vitals for the past 12 hrs:   Temp Pulse Resp BP SpO2   06/16/21 0207 97.6 °F (36.4 °C) 70 16 (!) 119/48 95 %   06/16/21 0045  71 18 102/74 97 %   06/16/21 0001 97.7 °F (36.5 °C) 69 16 (!) 112/48 98 %   06/15/21 2331  74 21 124/71 95 %   06/15/21 2300  73  128/72 96 %       Pulse Oximetry Analysis - 98% on RA, normal  Rate: 77 bpm  Rhythm: afib      Provider Notes (Medical Decision Making):     DDX:  Intracranial bleed, metastatic disease, electrolyte abnormality, UTI, small bowel obstruction, dehydration, dementia and progression of disease    Plan:  EKG, labs, UA, chest x-ray, head CT, CT abdomen pelvis, IV fluids, antiemetic    Impression:  Choledocholithiasis, elevated LFTs, nausea, vomiting, decreased p.o. intake    ED Course:   Initial assessment performed. The patients presenting problems have been discussed, and they are in agreement with the care plan formulated and outlined with them. I have encouraged them to ask questions as they arise throughout their visit. I reviewed the nursing notes and and vital signs from today's visit, as well as the electronic medical record system for any past medical records that were available that may contribute to the patients current condition, including previous evaluation for dehydration and acute kidney injury 2 years ago with associated UTI    Nursing notes will be reviewed as they become available in realtime while the pt has been in the ED. Bhanu Woods MD    EKG interpretation 2120: Atrial fibrillation, nl Axis, rate 66; MI n/a, QRS 90, QTc 454; NO STEMI; interpreted by Bhanu Woods MD    I personally reviewed/interpreted pt's imaging. Agree with official read by radiology as noted above. Bhanu Woods MD    PROGRESS NOTE:  Pt noted to have significant findings on CT concerning for choledocholithiasis versus cholangiocarcinoma. Will discuss with hospitalist for admission and GI consult  Bhanu Woods MD    CONSULT NOTE:     Bhanu Woods MD spoke with Dr. Yoana Rivera,   Specialty: To Rivera due to concern for choledocholithiasis/ascending cholangiocarcinoma. Discussed pt's HPI and available diagnostic results thus far. Expressed concerns for needed admission. Consultant will evaluate for admission. Bhanu Woods MD    ADMISSION NOTE:    Patient is being admitted to the hospital by Dr. Yoana Rivera. The results of their tests and reasons for their admission have been discussed with them and/or available family. They convey agreement and understanding for the need to be admitted and for their admission diagnosis.    Clair Mauricio. Gianna Henning MD           Critical Care Time:     none      Diagnosis     Clinical Impression:   1. Choledocholithiasis    2. Dehydration    3. Elevated liver enzymes        PLAN:  1.  Admit to hospitalist

## 2021-06-16 NOTE — PROGRESS NOTES
End of Shift Note    Bedside shift change report given to [facility] RN (oncoming nurse) by Julian Greene LPN (offgoing nurse). Report included the following information SBAR, Kardex, ED Summary, Intake/Output, MAR and Recent Results    Shift worked:  [id number]     Shift summary and any significant changes:     Patient admitted to unit at 0200. Patient tolerated care well. No complaints of pain. No significant changes noted. Concerns for physician to address:  none     Zone phone for oncoming shift:   [phone]       Activity:  Activity Level: Up with Assistance  Number times ambulated in hallways past shift: 0  Number of times OOB to chair past shift: 0    Cardiac:   Cardiac Monitoring: No      Cardiac Rhythm: Atrial Fib    Access:   Current line(s): PIV     Genitourinary:   Urinary status: due to void    Respiratory:   O2 Device: None (Room air)  Chronic home O2 use?: NO  Incentive spirometer at bedside: YES     GI:     Current diet:  DIET NPO  Passing flatus: YES  Tolerating current diet: YES       Pain Management:   Patient states pain is manageable on current regimen: YES    Skin:  Justin Score: 17  Interventions: float heels, limit briefs and nutritional support     Patient Safety:  Fall Score:  Total Score: 2  Interventions: bed/chair alarm, gripper socks and pt to call before getting OOB       Length of Stay:  Expected LOS: - - -  Actual LOS: 1      Julian Greene LPN

## 2021-06-16 NOTE — ED NOTES
Report received from [facility]. They advised of the patient's chief complaint, current status, orders completed (to include IV access/medications/radiology testing), outstanding orders that still need to be completed, and the treatment plan. Questions asked and answered prior to assumption of care.

## 2021-06-16 NOTE — PROGRESS NOTES
End of Shift Note    Bedside shift change report given to Ashley Keller (oncoming nurse) by Shari Panchal RN (offgoing nurse). Report included the following information SBAR, Kardex, Intake/Output, MAR and Recent Results    Shift worked:  7a-7p     Shift summary and any significant changes:     pt pulled IV out this am. 2 nurses from the floor attempted to obtain access unsuccessfully. PICC team called and they were also unable to obtain access. MRI screening form completed     Concerns for physician to address:  none     Zone phone for oncoming shift:   [phone]       Activity:  Activity Level: Up with Assistance  Number times ambulated in hallways past shift: 0  Number of times OOB to chair past shift: 0    Cardiac:   Cardiac Monitoring: No      Cardiac Rhythm: Atrial Fib    Access:   Current line(s): no access     Genitourinary:   Urinary status: voiding    Respiratory:   O2 Device: None (Room air)  Chronic home O2 use?: NO  Incentive spirometer at bedside: YES     GI:     Current diet:  ADULT DIET Clear Liquid  Passing flatus: YES  Tolerating current diet: YES       Pain Management:   Patient states pain is manageable on current regimen: YES    Skin:  Justin Score: 17  Interventions: float heels and increase time out of bed    Patient Safety:  Fall Score:  Total Score: 2  Interventions: bed/chair alarm, assistive device (walker, cane, etc), gripper socks, pt to call before getting OOB and stay with me (per policy)       Length of Stay:  Expected LOS: 3d 0h  Actual LOS: 1      Shari Panchal RN

## 2021-06-16 NOTE — CONSULTS
GI CONSULTATION NOTE  Carly Smith NP  [phone] NP in-hospital cell phone M-F until 4:30  After 5pm or on weekends, please call  for physician on call    NAME: Natividad Byrd   :  3/16/1930   MRN:  [id number]   Attending:  Dr. Heather Oh  Primary GI:  Dr. Alex Salas; Dr. Arden Delarosa covering   Date/Time:  2021 12:21 PM  Assessment:   Cholelithiasis   · CT abd/pel showed cholelithiasis, with dilated common bile duct 12 mm. Choledocholithiasis as well is irregular wall thickening of the distal common bile duct. Cannot exclude cholangiocarcinoma  · ,     Atrial fibrillation  CAD  · On ASA  · Treatment per primary team     Plan:   · MRI/MRCP ordered   · Follow LFT's  · Clear liquid diet  · Symptomatic care per primary team   Plan discussed with Dr. Renteria Allred:     HISTORY OF PRESENT ILLNESS:     Natividad Byrd is an 80 y.o.  female who we are asked to see for complaint of abnormal CT findings. She has a past medical history of hypertension, CVA, CAD, dementia, previous breast cancer, and reflux who presented via EMS to the ED with report of \"not having eaten anything for the last month. \"  Family reports that she was seen by her primary care doctor for these complaints. Patient family notes that every time that she goes to eat or drink anything she vomits. She was seen by Dr. Alex Salas in 2019 for similar complaints. CT abd/pel was ordered at that time but was never completed.      Past Medical History:   Diagnosis Date    Acid reflux     Arthritis     CAD (coronary artery disease)     MI, no cardiologist at this time    Cancer [facility])     breast cancer    Dementia ([facility] [address]     Hypertension     Stroke ([facility] [address]     approx 15 years prior per daughter ()      Past Surgical History:   Procedure Laterality Date    HX GYN      x6 vaginal births    LA BREAST SURGERY PROCEDURE UNLISTED      right breast removal due to cancer    LA CARDIAC SURG PROCEDURE UNLIST      x1 stent placed     Social History     Tobacco Use    Smoking status: Never Smoker    Smokeless tobacco: Never Used   Substance Use Topics    Alcohol use: No      History reviewed. No pertinent family history. No Known Allergies   Prior to Admission medications    Medication Sig Start Date End Date Taking? Authorizing Provider   traMADol (ULTRAM) 50 mg tablet Take 0.5 Tabs by mouth every eight (8) hours as needed for Pain. Max Daily Amount: 75 mg. 4/26/16   Michael Hsu MD   metoprolol succinate (TOPROL-XL) 25 mg XL tablet Take 1 Tab by mouth daily. 4/4/15   Bobby Grant MD   furosemide (LASIX) 20 mg tablet Take one tablet daily 4/4/15   Radha Hubbard MD   omeprazole (PRILOSEC) 40 mg capsule Take 1 Cap by mouth daily. 4/4/15   Bobby Grant MD   nitroglycerin (NITROSTAT) 0.4 mg SL tablet 0.4 mg by SubLINGual route every five (5) minutes as needed for Chest Pain. Karishma Mcgill MD   warfarin (COUMADIN) 5 mg tablet Take 5 mg by mouth daily. Karishma Mcgill MD   atorvastatin (LIPITOR) 20 mg tablet Take 20 mg by mouth daily. Karishma Mcgill MD   lisinopril (PRINIVIL, ZESTRIL) 5 mg tablet Take 5 mg by mouth daily. Karishma Mcgill MD   aspirin 81 mg chewable tablet Take 81 mg by mouth daily.     Karishma Mcgill MD       Patient Active Problem List   Diagnosis Code    Orthostatic hypotension I95.1    Anorexia R63.0    Hypokalemia E87.6    Cholelithiases K80.20    Nausea & vomiting R11.2    NADIA (acute kidney injury) ([facility] [address] N17.9       REVIEW OF SYSTEMS:    Constitutional: negative fever, negative chills, negative weight loss  Eyes:   negative visual changes  ENT:   negative sore throat, tongue or lip swelling   Respiratory:  negative cough, negative dyspnea  Cards:  negative for chest pain, palpitations, lower extremity edema  GI:   See HPI  :  negative for frequency, dysuria  Integument:  negative for rash and pruritus  Heme:  negative for easy bruising and gum/nose bleeding  Musculoskel: negative for myalgias,  back pain and muscle weakness  Neuro: negative for headaches, dizziness, vertigo  Psych:  negative for feelings of anxiety, depression     Objective:   VITALS:    Visit Vitals  BP (!) 108/55   Pulse 72   Temp 98.4 °F (36.9 °C)   Resp 18   Ht 5' 4\" (1.626 m)   Wt 75.8 kg (167 lb)   SpO2 92%   BMI 28.67 kg/m²       PHYSICAL EXAM:   General:           Pleasant elderly AA female. NAD   Head:               Normocephalic, without obvious abnormality, atraumatic. Eyes:               Conjunctivae clear and pale, anicteric sclerae. Pupils are equal  Nose:               Nares normal. No drainage or sinus tenderness. Throat:             Lips, mucosa, and tongue normal.  No Thrush  Neck:               Supple, symmetrical,  no adenopathy, thyroid: non tender  Back:               Symmetric,  No CVA tenderness. Lungs:             CTA bilaterally. No wheezing/rhonchi/rales. Chest wall:      No tenderness or deformity. No Accessory muscle use. Heart:               Irregular rhythm,  no murmur, rub or gallop. Abdomen:        Soft, non-tender. Not distended. Bowel sounds normal. No masses  Extremities:     Atraumatic, No cyanosis. No edema. No clubbing  Skin:                Texture, turgor normal. No rashes/lesions/jaundice  Psych:             Little to no insight. Not depressed. Not anxious or agitated. Neurologic:      EOMs intact. No facial asymmetry. No aphasia or slurred speech.  Normal                        strength, A/O X 2    LAB DATA REVIEWED:    Recent Results (from the past 24 hour(s))   CBC WITH AUTOMATED DIFF    Collection Time: 06/15/21  6:42 PM   Result Value Ref Range    WBC 6.4 3.6 - 11.0 K/uL    RBC 4.08 3.80 - 5.20 M/uL    HGB 13.7 11.5 - 16.0 g/dL    HCT 41.0 35.0 - 47.0 %    .5 (H) 80.0 - 99.0 FL    MCH 33.6 26.0 - 34.0 PG    MCHC 33.4 30.0 - 36.5 g/dL    RDW 14.2 11.5 - 14.5 %    PLATELET 405 464 - 788 K/uL    MPV 11.1 8.9 - 12.9 FL    NRBC 0.5 (H) 0  WBC    ABSOLUTE NRBC 0.03 (H) 0.00 - 0.01 K/uL    NEUTROPHILS 66 32 - 75 %    LYMPHOCYTES 25 12 - 49 %    MONOCYTES 8 5 - 13 %    EOSINOPHILS 1 0 - 7 %    BASOPHILS 0 0 - 1 %    IMMATURE GRANULOCYTES 0 0.0 - 0.5 %    ABS. NEUTROPHILS 4.1 1.8 - 8.0 K/UL    ABS. LYMPHOCYTES 1.6 0.8 - 3.5 K/UL    ABS. MONOCYTES 0.5 0.0 - 1.0 K/UL    ABS. EOSINOPHILS 0.1 0.0 - 0.4 K/UL    ABS. BASOPHILS 0.0 0.0 - 0.1 K/UL    ABS. IMM. GRANS. 0.0 0.00 - 0.04 K/UL    DF AUTOMATED     METABOLIC PANEL, COMPREHENSIVE    Collection Time: 06/15/21  6:42 PM   Result Value Ref Range    Sodium 133 (L) 136 - 145 mmol/L    Potassium 3.3 (L) 3.5 - 5.1 mmol/L    Chloride 88 (L) 97 - 108 mmol/L    CO2 41 (HH) 21 - 32 mmol/L    Anion gap 4 (L) 5 - 15 mmol/L    Glucose 137 (H) 65 - 100 mg/dL    BUN 21 (H) 6 - 20 MG/DL    Creatinine 1.32 (H) 0.55 - 1.02 MG/DL    BUN/Creatinine ratio 16 12 - 20      GFR est AA 46 (L) >60 ml/min/1.73m2    GFR est non-AA 38 (L) >60 ml/min/1.73m2    Calcium 9.5 8.5 - 10.1 MG/DL    Bilirubin, total 0.9 0.2 - 1.0 MG/DL    ALT (SGPT) 73 12 - 78 U/L    AST (SGOT) 154 (H) 15 - 37 U/L    Alk.  phosphatase 218 (H) 45 - 117 U/L    Protein, total 8.9 (H) 6.4 - 8.2 g/dL    Albumin 3.1 (L) 3.5 - 5.0 g/dL    Globulin 5.8 (H) 2.0 - 4.0 g/dL    A-G Ratio 0.5 (L) 1.1 - 2.2     EKG, 12 LEAD, INITIAL    Collection Time: 06/15/21  9:20 PM   Result Value Ref Range    Ventricular Rate 66 BPM    Atrial Rate 68 BPM    QRS Duration 90 ms    Q-T Interval 434 ms    QTC Calculation (Bezet) 454 ms    Calculated R Axis -26 degrees    Calculated T Axis 137 degrees    Diagnosis       Atrial fibrillation  Septal infarct (cited on or before 15-BERT-2021)  T wave abnormality, consider lateral ischemia     Confirmed by Larisa Chaudhary M.D. ([id number]) on 6/16/2021 8:57:29 AM     POC TROPONIN-I    Collection Time: 06/15/21  9:21 PM   Result Value Ref Range    Troponin-I (POC) <0.04 0.00 - 0.08 ng/mL   POC LACTIC ACID    Collection Time: 06/15/21 9:30 PM   Result Value Ref Range    Lactic Acid (POC) 2.20 (HH) 0.40 - 2.00 mmol/L   URINALYSIS W/ REFLEX CULTURE    Collection Time: 06/15/21 11:18 PM    Specimen: Urine   Result Value Ref Range    Color YELLOW/STRAW      Appearance CLEAR CLEAR      Specific gravity 1.005 1.003 - 1.030      pH (UA) 6.0 5.0 - 8.0      Protein Negative NEG mg/dL    Glucose Negative NEG mg/dL    Ketone Negative NEG mg/dL    Bilirubin Negative NEG      Blood Negative NEG      Urobilinogen 1.0 0.2 - 1.0 EU/dL    Nitrites Negative NEG      Leukocyte Esterase SMALL (A) NEG      WBC 5-10 0 - 4 /hpf    RBC 0-5 0 - 5 /hpf    Epithelial cells FEW FEW /lpf    Bacteria 1+ (A) NEG /hpf    UA:UC IF INDICATED CULTURE NOT INDICATED BY UA RESULT CNI      Hyaline cast 0-2 0 - 5 /lpf   PROTHROMBIN TIME + INR    Collection Time: 06/16/21 12:38 AM   Result Value Ref Range    INR 1.1 0.9 - 1.1      Prothrombin time 11.8 (H) 9.0 - 11.1 sec   LACTIC ACID    Collection Time: 06/16/21  1:46 AM   Result Value Ref Range    Lactic acid 1.4 0.4 - 2.0 MMOL/L   CBC WITH AUTOMATED DIFF    Collection Time: 06/16/21  1:46 AM   Result Value Ref Range    WBC 5.9 3.6 - 11.0 K/uL    RBC 3.92 3.80 - 5.20 M/uL    HGB 13.2 11.5 - 16.0 g/dL    HCT 39.2 35.0 - 47.0 %    .0 (H) 80.0 - 99.0 FL    MCH 33.7 26.0 - 34.0 PG    MCHC 33.7 30.0 - 36.5 g/dL    RDW 14.2 11.5 - 14.5 %    PLATELET 159 081 - 387 K/uL    MPV 10.8 8.9 - 12.9 FL    NRBC 0.3 (H) 0  WBC    ABSOLUTE NRBC 0.02 (H) 0.00 - 0.01 K/uL    NEUTROPHILS 61 32 - 75 %    LYMPHOCYTES 31 12 - 49 %    MONOCYTES 6 5 - 13 %    EOSINOPHILS 2 0 - 7 %    BASOPHILS 0 0 - 1 %    IMMATURE GRANULOCYTES 0 0.0 - 0.5 %    ABS. NEUTROPHILS 3.6 1.8 - 8.0 K/UL    ABS. LYMPHOCYTES 1.8 0.8 - 3.5 K/UL    ABS. MONOCYTES 0.4 0.0 - 1.0 K/UL    ABS. EOSINOPHILS 0.1 0.0 - 0.4 K/UL    ABS. BASOPHILS 0.0 0.0 - 0.1 K/UL    ABS. IMM.  GRANS. 0.0 0.00 - 0.04 K/UL    DF AUTOMATED     METABOLIC PANEL, BASIC    Collection Time: 06/16/21  3:20 AM   Result Value Ref Range    Sodium 134 (L) 136 - 145 mmol/L    Potassium 3.0 (L) 3.5 - 5.1 mmol/L    Chloride 92 (L) 97 - 108 mmol/L    CO2 39 (H) 21 - 32 mmol/L    Anion gap 3 (L) 5 - 15 mmol/L    Glucose 78 65 - 100 mg/dL    BUN 20 6 - 20 MG/DL    Creatinine 1.11 (H) 0.55 - 1.02 MG/DL    BUN/Creatinine ratio 18 12 - 20      GFR est AA 56 (L) >60 ml/min/1.73m2    GFR est non-AA 46 (L) >60 ml/min/1.73m2    Calcium 8.9 8.5 - 10.1 MG/DL   PROTHROMBIN TIME + INR    Collection Time: 06/16/21  3:20 AM   Result Value Ref Range    INR 1.1 0.9 - 1.1      Prothrombin time 11.9 (H) 9.0 - 11.1 sec       IMAGING RESULTS:  I have personally reviewed the imaging reports      Total time spent with patient: 50 minutes ________________________________________________________________________  Care Plan discussed with:  Patient x   Family     RN x              Consultant:       CT  6/16/2021:  ________________________________________________________________________    ___________________________________________________  Consulting Provider: Whit Iqbal NP      6/16/2021  12:21 PM

## 2021-06-17 ENCOUNTER — APPOINTMENT (OUTPATIENT)
Dept: MRI IMAGING | Age: 86
DRG: 418 | End: 2021-06-17
Attending: NURSE PRACTITIONER
Payer: MEDICARE

## 2021-06-17 LAB
ALBUMIN SERPL-MCNC: 2.7 G/DL (ref 3.5–5)
ALBUMIN/GLOB SERPL: 0.6 {RATIO} (ref 1.1–2.2)
ALP SERPL-CCNC: 173 U/L (ref 45–117)
ALT SERPL-CCNC: 46 U/L (ref 12–78)
ANION GAP SERPL CALC-SCNC: 4 MMOL/L (ref 5–15)
AST SERPL-CCNC: 82 U/L (ref 15–37)
BILIRUB SERPL-MCNC: 0.9 MG/DL (ref 0.2–1)
BUN SERPL-MCNC: 18 MG/DL (ref 6–20)
BUN/CREAT SERPL: 19 (ref 12–20)
CALCIUM SERPL-MCNC: 9.1 MG/DL (ref 8.5–10.1)
CHLORIDE SERPL-SCNC: 94 MMOL/L (ref 97–108)
CO2 SERPL-SCNC: 38 MMOL/L (ref 21–32)
CREAT SERPL-MCNC: 0.97 MG/DL (ref 0.55–1.02)
GLOBULIN SER CALC-MCNC: 4.8 G/DL (ref 2–4)
GLUCOSE SERPL-MCNC: 76 MG/DL (ref 65–100)
MAGNESIUM SERPL-MCNC: 2 MG/DL (ref 1.6–2.4)
POTASSIUM SERPL-SCNC: 2.8 MMOL/L (ref 3.5–5.1)
PROT SERPL-MCNC: 7.5 G/DL (ref 6.4–8.2)
SODIUM SERPL-SCNC: 136 MMOL/L (ref 136–145)

## 2021-06-17 PROCEDURE — 65270000029 HC RM PRIVATE

## 2021-06-17 PROCEDURE — 80053 COMPREHEN METABOLIC PANEL: CPT

## 2021-06-17 PROCEDURE — 74181 MRI ABDOMEN W/O CONTRAST: CPT

## 2021-06-17 PROCEDURE — 99222 1ST HOSP IP/OBS MODERATE 55: CPT | Performed by: INTERNAL MEDICINE

## 2021-06-17 PROCEDURE — 74011250637 HC RX REV CODE- 250/637: Performed by: INTERNAL MEDICINE

## 2021-06-17 PROCEDURE — 83735 ASSAY OF MAGNESIUM: CPT

## 2021-06-17 PROCEDURE — 74011250637 HC RX REV CODE- 250/637: Performed by: NURSE PRACTITIONER

## 2021-06-17 PROCEDURE — 36415 COLL VENOUS BLD VENIPUNCTURE: CPT

## 2021-06-17 PROCEDURE — 74011250637 HC RX REV CODE- 250/637: Performed by: HOSPITALIST

## 2021-06-17 RX ORDER — POTASSIUM CHLORIDE 750 MG/1
40 TABLET, FILM COATED, EXTENDED RELEASE ORAL ONCE
Status: COMPLETED | OUTPATIENT
Start: 2021-06-17 | End: 2021-06-17

## 2021-06-17 RX ORDER — ACETAMINOPHEN 500 MG
1000 TABLET ORAL EVERY 8 HOURS
Status: DISCONTINUED | OUTPATIENT
Start: 2021-06-17 | End: 2021-06-24 | Stop reason: HOSPADM

## 2021-06-17 RX ORDER — POTASSIUM CHLORIDE 750 MG/1
40 TABLET, FILM COATED, EXTENDED RELEASE ORAL
Status: DISPENSED | OUTPATIENT
Start: 2021-06-17 | End: 2021-06-17

## 2021-06-17 RX ORDER — METOPROLOL SUCCINATE 25 MG/1
25 TABLET, EXTENDED RELEASE ORAL DAILY
Status: DISCONTINUED | OUTPATIENT
Start: 2021-06-17 | End: 2021-06-24 | Stop reason: HOSPADM

## 2021-06-17 RX ADMIN — ACETAMINOPHEN 650 MG: 325 TABLET ORAL at 10:41

## 2021-06-17 RX ADMIN — POTASSIUM CHLORIDE 40 MEQ: 750 TABLET, FILM COATED, EXTENDED RELEASE ORAL at 09:11

## 2021-06-17 RX ADMIN — ACETAMINOPHEN 650 MG: 325 TABLET ORAL at 20:38

## 2021-06-17 RX ADMIN — METOPROLOL SUCCINATE 25 MG: 25 TABLET, EXTENDED RELEASE ORAL at 09:10

## 2021-06-17 RX ADMIN — ACETAMINOPHEN 1000 MG: 500 TABLET, FILM COATED ORAL at 15:56

## 2021-06-17 NOTE — PROGRESS NOTES
GI PROGRESS NOTE  Candy Nicole, FIORDALIZA  [phone] NP in-hospital cell phone M-F until 4:30  After 5pm or on weekends, please call  for physician on call    NAME:Ciara Johnson :3/16/1930 CMR:[id number]   ATTG: Dr. Alexy Weller   PCP: Viet Barboza MD  Date/Time:  2021 10:19 AM   Primary GI: Dr. Halie Dockery; Dr. Moy Winn covering   Reason for following: Abnormal CT findings of GI tract     Assessment:     Cholelithiasis   · CT abd/pel showed cholelithiasis, with dilated common bile duct 12 mm. Choledocholithiasis as well is irregular wall thickening of the distal common bile duct. Cannot exclude cholangiocarcinoma  · AST 82, ; trending down      Atrial fibrillation  CAD  · On ASA  · Treatment per primary team      Plan:     · MRI/MRCP ordered; patient refused last evening but is agreeable to have it completed today  · Follow LFT's  · Clear liquid diet  · Symptomatic care per primary team   *Plan of care discussed with Dr. Moy Winn      Subjective:   Discussed with RN events overnight. Patient resting in bed. Pleasantly confused. No new complaints. Review of Systems:  Symptom Y/N Comments  Symptom Y/N Comments   Fever/Chills n   Chest Pain n    Cough n   Headaches n    Sputum n   Joint Pain n    SOB/YANES n   Pruritis/Rash n    Tolerating Diet y   Other       Could NOT obtain due to:      Objective:   VITALS:   Last 24hrs VS reviewed since prior progress note. Most recent are:  Visit Vitals  BP (!) 111/53   Pulse 75   Temp 97.7 °F (36.5 °C)   Resp 18   Ht 5' 4\" (1.626 m)   Wt 75.8 kg (167 lb)   SpO2 96%   BMI 28.67 kg/m²     No intake or output data in the 24 hours ending 21 1019  PHYSICAL EXAM:  General: Pleasant elderly AA female. NAD   HEENT: NC, Atraumatic. Anicteric sclerae. Lungs:  CTA Bilaterally. No Wheezing/Rhonchi/Rales. Heart:  Regular rate rhythm,  No murmur, No Rubs, No Gallops  Abdomen: Soft, Non distended, Non tender.   +Bowel sounds, no HSM  Extremities: No c/c/e  Neurologic:  Alert and confused. No acute neurological distress   Psych:   Little to no insight. Not anxious nor agitated. Lab and Radiology Data Reviewed: (see below)    Medications Reviewed: (see below)  PMH/SH reviewed - no change compared to H&P  ________________________________________________________________________  Total time spent with patient: 25 minutes ________________________________________________________________________  Care Plan discussed with:  Patient x   Family     RN x              Consultant:       Carloz Barkley NP     Procedures: see electronic medical records for all procedures/Xrays and details which were not copied into this note but were reviewed prior to creation of Plan. LABS:  Recent Labs     06/16/21  0146 06/15/21  1842   WBC 5.9 6.4   HGB 13.2 13.7   HCT 39.2 41.0    174     Recent Labs     06/17/21  0313 06/16/21  0320 06/15/21  1842    134* 133*   K 2.8* 3.0* 3.3*   CL 94* 92* 88*   CO2 38* 39* 41*   BUN 18 20 21*   CREA 0.97 1.11* 1.32*   GLU 76 78 137*   CA 9.1 8.9 9.5   MG 2.0  --   --      Recent Labs     06/17/21  0313 06/15/21  1842   * 218*   TP 7.5 8.9*   ALB 2.7* 3.1*   GLOB 4.8* 5.8*     Recent Labs     06/16/21  0320 06/16/21  0038   INR 1.1 1.1   PTP 11.9* 11.8*      No results for input(s): FE, TIBC, PSAT, FERR in the last 72 hours. No results found for: FOL, RBCF  No results for input(s): PH, PCO2, PO2 in the last 72 hours. No results for input(s): CPK, CKMB in the last 72 hours.     No lab exists for component: TROPONINI  Lab Results   Component Value Date/Time    Color YELLOW/STRAW 06/15/2021 11:18 PM    Appearance CLEAR 06/15/2021 11:18 PM    Specific gravity 1.005 06/15/2021 11:18 PM    Specific gravity 1.022 05/06/2019 07:58 PM    pH (UA) 6.0 06/15/2021 11:18 PM    Protein Negative 06/15/2021 11:18 PM    Glucose Negative 06/15/2021 11:18 PM    Ketone Negative 06/15/2021 11:18 PM    Bilirubin Negative 06/15/2021 11:18 PM    Urobilinogen 1.0 06/15/2021 11:18 PM    Nitrites Negative 06/15/2021 11:18 PM    Leukocyte Esterase SMALL (A) 06/15/2021 11:18 PM    Epithelial cells FEW 06/15/2021 11:18 PM    Bacteria 1+ (A) 06/15/2021 11:18 PM    WBC 5-10 06/15/2021 11:18 PM    RBC 0-5 06/15/2021 11:18 PM       MEDICATIONS:  Current Facility-Administered Medications   Medication Dose Route Frequency    potassium chloride SR (KLOR-CON 10) tablet 40 mEq  40 mEq Oral NOW    metoprolol succinate (TOPROL-XL) XL tablet 25 mg  25 mg Oral DAILY    ziprasidone (GEODON) 10 mg in sterile water (preservative free) 0.5 mL injection  10 mg IntraMUSCular Q8H PRN    ondansetron (ZOFRAN) injection 4 mg  4 mg IntraVENous Q1H PRN    sodium chloride (NS) flush 5-40 mL  5-40 mL IntraVENous Q8H    sodium chloride (NS) flush 5-40 mL  5-40 mL IntraVENous PRN    acetaminophen (TYLENOL) tablet 650 mg  650 mg Oral Q6H PRN    Or    acetaminophen (TYLENOL) suppository 650 mg  650 mg Rectal Q6H PRN    polyethylene glycol (MIRALAX) packet 17 g  17 g Oral DAILY PRN    ondansetron (ZOFRAN ODT) tablet 4 mg  4 mg Oral Q8H PRN    Or    ondansetron (ZOFRAN) injection 4 mg  4 mg IntraVENous Q6H PRN    famotidine (PF) (PEPCID) 20 mg in 0.9% sodium chloride 10 mL injection  20 mg IntraVENous BID    0.9% sodium chloride infusion  50 mL/hr IntraVENous CONTINUOUS

## 2021-06-17 NOTE — PROGRESS NOTES
Hospitalist Progress Note    NAME: Soha Bradshaw   :  3/16/1930   MRN:  [id number]     Interim Hospital Summary: 80 y.o. female whom presented on 6/15/2021 with      Assessment / Plan:    Cholelithiasis with common bile duct dilatation  Anorexia and weight loss  -CT abdomen  1. Cholelithiasis, with dilated common bile duct 12 mm. Choledocholithiasis as  well is irregular wall thickening of the distal common bile duct. Cannot exclude  cholangiocarcinoma. 2.  Uterine fibroids. -patient declined MRI last night. -ERCP?  -will ask palliative care to help with goals of care    Atrial fibrillation, probably chronic  CAD  -Holding aspirin and Coumadin in anticipation of possible need for procedures  -Lisinopril, Lasix held due to soft blood pressure.   -restarted metoprolol    Dementia    25.0 - 29.9 Overweight / Body mass index is 28.67 kg/m². Estimated discharge date:   Barriers:    Code status: Full  Prophylaxis: SCD's  Recommended Disposition:  PT, OT, RN       Subjective:     Chief Complaint / Reason for Physician Visit  Follow up of gallstones, anorexia, dilated common bile duct, A. fib  Chart reviewed in detail. Discussed with RN events overnight. Review of Systems:  Symptom Y/N Comments  Symptom Y/N Comments   Fever/Chills    Chest Pain     Poor Appetite    Edema     Cough    Abdominal Pain     Sputum    Joint Pain     SOB/YANES    Pruritis/Rash     Nausea/vomit    Tolerating PT/OT     Diarrhea    Tolerating Diet     Constipation    Other       Could NOT obtain due to:      PO intake: No data found. Objective:     VITALS:   Last 24hrs VS reviewed since prior progress note.  Most recent are:  Patient Vitals for the past 24 hrs:   Temp Pulse Resp BP SpO2   21 0756 97.7 °F (36.5 °C) 75 18 (!) 111/53 96 %   21 0410 98 °F (36.7 °C) 77 18 117/77 98 %   21 0039 97.7 °F (36.5 °C) 78 18 120/74 96 %   21 97.8 °F (36.6 °C) 80 18 102/85 95 %   06/16/21 1940 98 °F (36.7 °C) 82 18 110/65 92 %   06/16/21 1430 98.6 °F (37 °C) 75 18 110/60 94 %   06/16/21 1036 98.4 °F (36.9 °C) 72 18 (!) 108/55 92 %     No intake or output data in the 24 hours ending 06/17/21 0846     I had a face to face encounter, and independently examined this patient on 6/17/2021, as outlined below:  PHYSICAL EXAM:  General: WD, WN. Alert, cooperative, no acute distress    EENT:  EOMI. Anicteric sclerae. MMM  Resp:  CTA bilaterally, no wheezing or rales. No accessory muscle use  CV:  Regular  rhythm,  No edema  GI:  Soft, Non distended, Non tender. +Bowel sounds  Neurologic:  Alert and oriented X 3, normal speech,   Psych:   Good insight. Not anxious nor agitated  Skin:  No rashes. No jaundice    Reviewed most current lab test results and cultures  YES  Reviewed most current radiology test results   YES  Review and summation of old records today    NO  Reviewed patient's current orders and MAR    YES  PMH/ reviewed - no change compared to H&P  ________________________________________________________________________  Care Plan discussed with:    Comments   Patient x    Family      RN     Care Manager     Consultant                        Multidiciplinary team rounds were held today with , nursing, pharmacist and clinical coordinator. Patient's plan of care was discussed; medications were reviewed and discharge planning was addressed.      ________________________________________________________________________  Total NON critical care TIME:  25    Minutes    Total CRITICAL CARE TIME Spent:   Minutes non procedure based      Comments   >50% of visit spent in counseling and coordination of care x     This includes time during multidisciplinary rounds if indicated above   ________________________________________________________________________  Joe Hollis MD     Procedures: see electronic medical records for all procedures/Xrays and details which were not copied into this note but were reviewed prior to creation of Plan. LABS:  I reviewed today's most current labs and imaging studies.   Pertinent labs include:  Recent Labs     06/16/21  0146 06/15/21  1842   WBC 5.9 6.4   HGB 13.2 13.7   HCT 39.2 41.0    174     Recent Labs     06/17/21  0313 06/16/21  0320 06/16/21  0038 06/15/21  1842    134*  --  133*   K 2.8* 3.0*  --  3.3*   CL 94* 92*  --  88*   CO2 38* 39*  --  41*   GLU 76 78  --  137*   BUN 18 20  --  21*   CREA 0.97 1.11*  --  1.32*   CA 9.1 8.9  --  9.5   MG 2.0  --   --   --    ALB 2.7*  --   --  3.1*   TBILI 0.9  --   --  0.9   ALT 46  --   --  73   INR  --  1.1 1.1  --

## 2021-06-17 NOTE — PROGRESS NOTES
Spiritual Care Assessment/Progress Note  [facility]      NAME: Kayla Curry      MRN: [id number]  AGE: 80 y.o. SEX: female  Mosque Affiliation: Unknown   Language: English     6/17/2021     Total Time (in minutes): 17     Spiritual Assessment begun in [facility] through conversation with:         [x]Patient        [x] Family    [] Friend(s)        Reason for Consult: Initial/Spiritual assessment, patient floor     Spiritual beliefs: (Please include comment if needed)     [x] Identifies with a papo tradition:         [] Supported by a papo community:            [] Claims no spiritual orientation:           [] Seeking spiritual identity:                [] Adheres to an individual form of spirituality:           [] Not able to assess:                           Identified resources for coping:      [x] Prayer                               [] Music                  [] Guided Imagery     [x] Family/friends                 [] Pet visits     [] Devotional reading                         [] Unknown     [x] Other: Humor                                               Interventions offered during this visit: (See comments for more details)    Patient Interventions: Affirmation of emotions/emotional suffering, Affirmation of papo, Catharsis/review of pertinent events in supportive environment, Coping skills reviewed/reinforced, Initial/Spiritual assessment, patient floor, Normalization of emotional/spiritual concerns, Prayer (assurance of), Life review/legacy     Family/Friend(s):  Affirmation of emotions/emotional suffering, Affirmation of papo, Catharsis/review of pertinent events in supportive environment, Coping skills reviewed/reinforced, Iconic (affirming the presence of God/Higher Power), Initial Assessment, Life review/legacy, Normalization of emotional/spiritual concerns, Prayer (assurance of), Reframing, Mosque beliefs/image of God discussed     Plan of Care:     [] Support spiritual and/or cultural needs    [] Support AMD and/or advance care planning process      [] Support grieving process   [] Coordinate Rites and/or Rituals    [] Coordination with community clergy   [] No spiritual needs identified at this time   [] Detailed Plan of Care below (See Comments)  [] Make referral to Music Therapy  [] Make referral to Pet Therapy     [] Make referral to Addiction services  [] Make referral to [facility]  [] Make referral to Spiritual Care Partner  [] No future visits requested        [x] Follow up upon further referrals     Comments: Provided initial spiritual assessment for pt [facility] and family present at bedside in [facility]. Processed LOS and head pain with patient. Affirmed carly of having family present and provided empathetic listening as she feels unheard. She is looking forward to upcoming scan to lessen medical uncertainty. Family (daughter) was present at bedside. Spoke of values and importance of memories as people get older and change. Assured of prayers and advised of  services.     [place] 1 BRIDGER Mackey 1 Provider   Paging Service [phone])

## 2021-06-17 NOTE — PROGRESS NOTES
Transition of Care Plan:    RUR: 12%  Disposition: TBD  Follow up appointments: PCP & specialists as needed  DME needed: TBD  Transportation at Discharge: Family  [place] or means to access home:  No  IM Medicare letter: Needed at d/c  Caregiver Contact: Dtr/ [id number] [address] ([phone])  2nd- Sameul Reasons ([phone])  Discharge Caregiver contacted prior to discharge? CM reviewed pt's chart. CM met w/ the pt at bedside to introduce role & complete assessment. Pt able to confirm some demographics but appeared disoriented throughout conversation. Per H&P, pt has hx of dementia & this is synonymous with her baseline. CM deferred to pt's dtr/ [address] ([phone]) to complete assessment. Dtr confirmed pt lives with her in single level house/ 2 [address]. Pt requires minimal assistance with ADLS/ IADLs & does not drive. Pt's dtr & family assist with transportation needs. No prior IPR/SAH reported; pt had [address] in 2015. Pt has [employer] Medicare & Medicaid. Dtr reported no barriers w/ transportation or medication costs. Dtr to transport at d/c. No addtl questions or concerns reported at this time. CM noted palliative consult placed on 6/17. Will continue to follow.       Reason for Admission:  Choledocholithiasis                    RUR Score:          12%           Plan for utilizing home health:      TBD- prior hx w/ [address]    PCP: First and Last name:  Ernestine Davis MD   Name of Practice: [facility] [place]   Are you a current patient: Yes/No: Yes   Approximate date of last visit: 6/15/2021   Can you participate in a virtual visit with your PCP:                     Current Advanced Directive/Advance Care Plan: Full Code  Zilina 13 (ACP) Conversation  Date of Conversation: 6/17/2021  Conducted with: Healthcare Decision Maker: Named in Advance Directive or Healthcare Power of [address]:   Dtr/ LIN- Sajan Reyes ([phone])  Elena Gaspar ([phone])  Click here to complete [address] including selection of the Healthcare Decision Maker Relationship (ie \"Primary\")  Today we documented Decision Maker(s) consistent with ACP documents on file. Content/Action Overview: Has ACP document(s) on file - reflects the patient's care preferences  Reviewed DNR/DNI and patient elects Full Code (Attempt Resuscitation)    Length of Voluntary ACP Conversation in minutes:  <16 minutes (Non-Billable)      Care Management Interventions  PCP Verified by CM:  Yes  Palliative Care Criteria Met (RRAT>21 & CHF Dx)?: Yes  Palliative Consult Recommended?: Yes (consult placed 6/17)  Mode of Transport at Discharge: BLS  Transition of Care Consult (CM Consult): Discharge Planning  Current Support Network: Family Lives [place], Lives with Caregiver (lives w/ dtr in singlevel level home/ 2 [facility])  Confirm Follow Up Transport: Family  Discharge Location  Discharge Placement: Unable to determine at this time    MJ Benavidez  Care Management

## 2021-06-17 NOTE — PROGRESS NOTES
End of Shift Note    Bedside shift change report given to [facility] (oncoming nurse) by Ellsworth Severin (offgoing nurse). Report included the following information SBAR, Kardex and MAR    Shift worked:  7p-7a     Shift summary and any significant changes:     Patient does not have any IV access. Patient was sent to do a MRI at shift change, patient subsequently refused to get it done. Patient has been resting comfortably throughout my shift. Patient was repositioned during my shift. Patient did not complain of any pain during my shift. Patient teaching and routine rounding has been done. Morning labs were done, patient Potassium level was 2.8, I subsequently emailed the NP and 40mEq Potassium tablets were ordered STAT. I explained to the patient the importance of taking this medication but the patient stated, Cynda Meigs was feeling just fine. \"     Concerns for physician to address:       Zone phone for oncoming shift:          Activity:  Activity Level: Up with Assistance  Number times ambulated in hallways past shift: 0  Number of times OOB to chair past shift: 0    Cardiac:   Cardiac Monitoring: No      Cardiac Rhythm: Atrial Fib    Access:   Current line(s): no access     Genitourinary:   Urinary status: incontinent    Respiratory:   O2 Device: None (Room air)  Chronic home O2 use?: NO  Incentive spirometer at bedside: NO     GI:     Current diet:  ADULT DIET Clear Liquid  Passing flatus: YES  Tolerating current diet: YES       Pain Management:   Patient states pain is manageable on current regimen: YES    Skin:  Justin Score: 20  Interventions: turn team and PT/OT consult    Patient Safety:  Fall Score:  Total Score: 3  Interventions: gripper socks and pt to call before getting OOB  High Fall Risk: Yes    Length of Stay:  Expected LOS: 3d 0h  Actual LOS: 2      Ellsworth Severin

## 2021-06-17 NOTE — PROGRESS NOTES
Received message from patient's nurse Angelo Madden stating :    Morning labs were done. Potassium was 2.8. This patient does not have an IV access. She does swallow okay. Discussion / orders:    Potassium chloride 40 meq by mouth now and again in 2 hours           Please note that this note was dictated using Dragon computer voice recognition software. Quite often unanticipated grammatical, syntax, homophones, and other interpretive errors are inadvertently transcribed by the computer software. Please disregard these errors. Please excuse any errors that have escaped final proofreading.

## 2021-06-17 NOTE — PROGRESS NOTES
Re: Famotidine (P&T/MEC approved dose change has been made on this patient)      Crcl: 38 ml/min  Ordered dose: 20 mg IV q 12h   MEC ordered dose change: 20 mg IV q 24hrs (qhs) for Crcl less than 50ml/min    Thanks,  Mily Nielsen, [facility]

## 2021-06-17 NOTE — CONSULTS
Palliative Medicine Consult  Sal: [phone] ([phone])    Patient Name: Dian Tesfaye  YOB: 1930    Date of Initial Consult: 6/17/2021  Reason for Consult: care decisions  Requesting Provider: Dr. Mignon Vaca  Primary Care Physician: Osorio Ansari MD     SUMMARY:   Dian Tesfaye is a 80 y.o. with a past history of Dementia, Hx CVA, chronic afib, CAD s/p 1 stent, GERD, breast cancer s/p R mastectomy  who was admitted on 6/15/2021 from home  with a diagnosis of post prandial vomiting for one month, 30 pound weight loss . Current medical issues leading to Palliative Medicine involvement include: CT abd/pelvis show cholelithiasis, and dilater CBD 12mm, irregular thickening of distal CBD, cannot exclude cholangiocarcinoma. Patient was to have MRI/ MRCP yesterday but refused (she has dementia, does not remember doing this) and it's rescheduled today, with daughter at bedside to encourage her to cooperate with MRI. Chronic headaches. Head CT negative. Patient lives with her daughter Ligia Thomas and Son jhony. One level home but 2 steps. She ambulates slowly with walker. She spends all day up in recliner watching TV. Daughter assist patient with ADLs, manages medications. PALLIATIVE DIAGNOSES:   1. Nausea with vomiting after solid food. 2. 30 pound weight loss. hypoalbuminemia  3. Gallstones. Dilated CBD, irregular wall distal CBD, further workup pending. 4. Patient does not demonstrate decision making capacity. Use MPOA, daughter Ligia Thomas  5. Dementia  6. Chronic headaches        PLAN:   1. Palliative Medicine introduced to patient, daughter [place], as added layer of support. 2. Hear of recent events. 3. Plan in place today for dtr to stay with patient until MRI/ MRCP completed. 4. Discuss plan to first gather a little more information about the gallbladder, then discuss pros/cons of further interventions.   5. Will schedule tylenol for her headaches (she was using this regularly at home for daily headaches)   6. Initial consult note routed to primary continuity provider and/or primary health care team members  7. Communicated plan of care with: Palliative Natalya FELIZ 192 Team     GOALS OF CARE / TREATMENT PREFERENCES:     GOALS OF CARE:   to be further discussed    TREATMENT PREFERENCES:   Code Status: Full Code    Advance Care Planning:  [] The [facility] Interdisciplinary Team has updated the ACP Navigator with [address] and Patient Capacity      Advance Care Planning 6/16/2021   Patient's Healthcare Decision Maker is: -   Primary Decision Maker Name -   Primary Decision Maker Phone Number -   Primary Decision Maker Relationship to Patient -   Secondary Decision Maker Name -   Secondary Decision [facility] Phone Number -   Secondary Decision Maker Relationship to Patient -   Confirm Advance Directive Yes, on file   Patient Would Like to Complete Advance Directive -             Other Instructions: Other:    As far as possible, the palliative care team has discussed with patient / health care proxy about goals of care / treatment preferences for patient. HISTORY:     History obtained from:   daughter  CHIEF COMPLAINT:  vomiting    HPI/SUBJECTIVE:    The patient is:   [x] Verbal and participatory  [] Non-participatory due to:     80year old female with dementia, admitted with one month of vomiting. Able to keep down water, gingerail, most clear liquids. Cannot keep down soup or solid food. Vomits immediately after eating any solid food. This has been ongoing for about one month    Also having headaches in the morning.   Getting weaker, not able to ambulate as well    Clinical Pain Assessment (nonverbal scale for severity on nonverbal patients):   Clinical Pain Assessment  Severity: 1          Duration: for how long has pt been experiencing pain (e.g., 2 days, 1 month, years)  Frequency: how often pain is an issue (e.g., several times per day, once every few days, constant)     FUNCTIONAL ASSESSMENT:     Palliative Performance Scale (PPS):  PPS: 40       PSYCHOSOCIAL/SPIRITUAL SCREENING:     Palliative IDT has assessed this patient for cultural preferences / practices and a referral made as appropriate to needs (Cultural Services, Patient Advocacy, Ethics, etc.)    Any spiritual / Scientology concerns:  [] Yes /  [x] No    Caregiver Burnout:  [] Yes /  [x] No /  [] No Caregiver Present      Anticipatory grief assessment:   [x] Normal  / [] Maladaptive       ESAS Anxiety: Anxiety: 0    ESAS Depression:          REVIEW OF SYSTEMS:     Positive and pertinent negative findings in ROS are noted above in HPI. The following systems were [x] reviewed / [] unable to be reviewed as noted in HPI  Other findings are noted below. Systems: constitutional, ears/nose/mouth/throat, respiratory, gastrointestinal, genitourinary, musculoskeletal, integumentary, neurologic, psychiatric, endocrine. Positive findings noted below. Modified ESAS Completed by: provider           Pain: 1   Anxiety: 0 Nausea: 2   Anorexia: 2 Dyspnea: 0     Constipation: No              PHYSICAL EXAM:     From RN flowsheet:  Wt Readings from Last 3 Encounters:   06/15/21 75.8 kg (167 lb)   05/06/19 68 kg (150 lb)   04/26/16 99.8 kg (220 lb)     Blood pressure 132/72, pulse 80, temperature 97.7 °F (36.5 °C), resp. rate 18, height 5' 4\" (1.626 m), weight 75.8 kg (167 lb), SpO2 96 %. Pain Scale 1: Numeric (0 - 10)  Pain Intensity 1: 0                 Last bowel movement, if known:     Constitutional: pleasant elderly female NAD  Eyes: pupils equal, anicteric  No respiratory distress. Speech is fluent, affect is normal  Oriented to name, place. Does not know her age, birthday, season of the year  Does not remember refusing MRI test yesterday.        HISTORY:     Active Problems:    Anorexia (6/15/2021)      Hypokalemia (6/15/2021)      Cholelithiases (6/15/2021)      Nausea & vomiting (6/15/2021)      NADIA (acute kidney injury) ([facility] [address] (6/15/2021)      Past Medical History:   Diagnosis Date    Acid reflux     Arthritis     CAD (coronary artery disease)     MI, no cardiologist at this time    Cancer [facility])     breast cancer    Dementia ([facility] [address]     Hypertension     Stroke ([facility] [address]     approx 15 years prior per daughter (2000)      Past Surgical History:   Procedure Laterality Date    HX GYN      x6 vaginal births    MD BREAST SURGERY PROCEDURE UNLISTED      right breast removal due to cancer    MD CARDIAC SURG PROCEDURE UNLIST      x1 stent placed      History reviewed. No pertinent family history. History reviewed, no pertinent family history.   Social History     Tobacco Use    Smoking status: Never Smoker    Smokeless tobacco: Never Used   Substance Use Topics    Alcohol use: No     No Known Allergies   Current Facility-Administered Medications   Medication Dose Route Frequency    potassium chloride SR (KLOR-CON 10) tablet 40 mEq  40 mEq Oral NOW    metoprolol succinate (TOPROL-XL) XL tablet 25 mg  25 mg Oral DAILY    famotidine (PF) (PEPCID) 20 mg in 0.9% sodium chloride 10 mL injection  20 mg IntraVENous QHS    acetaminophen (TYLENOL) tablet 1,000 mg  1,000 mg Oral Q8H    ziprasidone (GEODON) 10 mg in sterile water (preservative free) 0.5 mL injection  10 mg IntraMUSCular Q8H PRN    ondansetron (ZOFRAN) injection 4 mg  4 mg IntraVENous Q1H PRN    sodium chloride (NS) flush 5-40 mL  5-40 mL IntraVENous Q8H    sodium chloride (NS) flush 5-40 mL  5-40 mL IntraVENous PRN    acetaminophen (TYLENOL) tablet 650 mg  650 mg Oral Q6H PRN    Or    acetaminophen (TYLENOL) suppository 650 mg  650 mg Rectal Q6H PRN    polyethylene glycol (MIRALAX) packet 17 g  17 g Oral DAILY PRN    ondansetron (ZOFRAN ODT) tablet 4 mg  4 mg Oral Q8H PRN    Or    ondansetron (ZOFRAN) injection 4 mg  4 mg IntraVENous Q6H PRN    0.9% sodium chloride infusion  50 mL/hr IntraVENous CONTINUOUS          LAB AND IMAGING FINDINGS:     Lab Results   Component Value Date/Time    WBC 5.9 06/16/2021 01:46 AM    HGB 13.2 06/16/2021 01:46 AM    PLATELET 196 41/26/5417 01:46 AM     Lab Results   Component Value Date/Time    Sodium 136 06/17/2021 03:13 AM    Potassium 2.8 (L) 06/17/2021 03:13 AM    Chloride 94 (L) 06/17/2021 03:13 AM    CO2 38 (H) 06/17/2021 03:13 AM    BUN 18 06/17/2021 03:13 AM    Creatinine 0.97 06/17/2021 03:13 AM    Calcium 9.1 06/17/2021 03:13 AM    Magnesium 2.0 06/17/2021 03:13 AM      Lab Results   Component Value Date/Time    Alk. phosphatase 173 (H) 06/17/2021 03:13 AM    Protein, total 7.5 06/17/2021 03:13 AM    Albumin 2.7 (L) 06/17/2021 03:13 AM    Globulin 4.8 (H) 06/17/2021 03:13 AM     Lab Results   Component Value Date/Time    INR 1.1 06/16/2021 03:20 AM    Prothrombin time 11.9 (H) 06/16/2021 03:20 AM    aPTT 22.6 12/20/2018 04:09 PM      No results found for: IRON, FE, TIBC, IBCT, PSAT, FERR   No results found for: PH, PCO2, PO2  No components found for: [place]   Lab Results   Component Value Date/Time    CK 39 12/20/2018 04:09 PM    CK - MB <0.5 (L) 04/26/2016 05:05 PM                Total time:   Counseling / coordination time, spent as noted above:   > 50% counseling / coordination?:     Prolonged service was provided for  []30 min   []75 min in face to face time in the presence of the patient, spent as noted above. Time Start:   Time End:   Note: this can only be billed with 48472 (initial) or 95229 (follow up). If multiple start / stop times, list each separately.

## 2021-06-18 LAB
ANION GAP SERPL CALC-SCNC: 4 MMOL/L (ref 5–15)
BUN SERPL-MCNC: 15 MG/DL (ref 6–20)
BUN/CREAT SERPL: 17 (ref 12–20)
CALCIUM SERPL-MCNC: 9 MG/DL (ref 8.5–10.1)
CHLORIDE SERPL-SCNC: 97 MMOL/L (ref 97–108)
CO2 SERPL-SCNC: 33 MMOL/L (ref 21–32)
CREAT SERPL-MCNC: 0.9 MG/DL (ref 0.55–1.02)
GLUCOSE SERPL-MCNC: 72 MG/DL (ref 65–100)
MAGNESIUM SERPL-MCNC: 2 MG/DL (ref 1.6–2.4)
POTASSIUM SERPL-SCNC: 3.5 MMOL/L (ref 3.5–5.1)
SODIUM SERPL-SCNC: 134 MMOL/L (ref 136–145)

## 2021-06-18 PROCEDURE — 65270000029 HC RM PRIVATE

## 2021-06-18 PROCEDURE — 99231 SBSQ HOSP IP/OBS SF/LOW 25: CPT | Performed by: INTERNAL MEDICINE

## 2021-06-18 PROCEDURE — 36415 COLL VENOUS BLD VENIPUNCTURE: CPT

## 2021-06-18 PROCEDURE — 97530 THERAPEUTIC ACTIVITIES: CPT

## 2021-06-18 PROCEDURE — 74011250637 HC RX REV CODE- 250/637: Performed by: INTERNAL MEDICINE

## 2021-06-18 PROCEDURE — 83735 ASSAY OF MAGNESIUM: CPT

## 2021-06-18 PROCEDURE — 97161 PT EVAL LOW COMPLEX 20 MIN: CPT

## 2021-06-18 PROCEDURE — 80048 BASIC METABOLIC PNL TOTAL CA: CPT

## 2021-06-18 RX ADMIN — METOPROLOL SUCCINATE 25 MG: 25 TABLET, EXTENDED RELEASE ORAL at 08:46

## 2021-06-18 RX ADMIN — ACETAMINOPHEN 1000 MG: 500 TABLET, FILM COATED ORAL at 08:46

## 2021-06-18 RX ADMIN — ACETAMINOPHEN 500 MG: 500 TABLET, FILM COATED ORAL at 00:10

## 2021-06-18 NOTE — PROGRESS NOTES
Hospitalist Progress Note    NAME: Jarrod Gutierrez   :  3/16/1930   MRN:  [id number]     Interim Hospital Summary: 80 y.o. female whom presented on 6/15/2021 with      Assessment / Plan:    Cholelithiasis   Choledocholithiasis   Nausea vomiting  Anorexia and weight loss  -CT abdomen  1. Cholelithiasis, with dilated common bile duct 12 mm. Choledocholithiasis as  well is irregular wall thickening of the distal common bile duct. Cannot exclude  cholangiocarcinoma. 2.  Uterine fibroids. -MRCP: Cholelithiasis with numerous common hepatic and common bile duct stones.  -getting UGIS as patient continues to vomit  -Follow up with Dr Kaylee Ken as OP for ERCP. -advance diet as tolerated. Atrial fibrillation, probably chronic  CAD  -Holding aspirin and Coumadin in anticipation of possible need for procedures  -Lisinopril, Lasix held due to soft blood pressure.   -restarted metoprolol    Dementia    25.0 - 29.9 Overweight / Body mass index is 28.67 kg/m². Estimated discharge date:   Barriers:    Code status: Full  Prophylaxis: SCD's  Recommended Disposition:  PT, OT, RN       Subjective:     Chief Complaint / Reason for Physician Visit  Follow up of gallstones, anorexia, dilated common bile duct, A. fib  Chart reviewed in detail. Discussed with RN events overnight. Review of Systems:  Symptom Y/N Comments  Symptom Y/N Comments   Fever/Chills    Chest Pain     Poor Appetite    Edema     Cough    Abdominal Pain     Sputum    Joint Pain     SOB/YANES    Pruritis/Rash     Nausea/vomit    Tolerating PT/OT     Diarrhea    Tolerating Diet     Constipation    Other       Could NOT obtain due to:      PO intake: No data found. Objective:     VITALS:   Last 24hrs VS reviewed since prior progress note.  Most recent are:  Patient Vitals for the past 24 hrs:   Temp Pulse Resp BP SpO2   21 1106 97.7 °F (36.5 °C) 72 18 (!) 112/53 100 %   21 0748 97.4 °F (36.3 °C) 76 18 (!) 114/56 92 %   06/18/21 0305 97.5 °F (36.4 °C) 79 18 (!) 125/57 96 %   06/17/21 2019 97.4 °F (36.3 °C) 73 18 (!) 115/55 96 %   06/17/21 1456 97.7 °F (36.5 °C) 80 18 132/72 96 %       Intake/Output Summary (Last 24 hours) at 6/18/2021 1248  Last data filed at 6/18/2021 0305  Gross per 24 hour   Intake 233 ml   Output    Net 233 ml        I had a face to face encounter, and independently examined this patient on 6/18/2021, as outlined below:  PHYSICAL EXAM:  General: WD, WN. Alert, cooperative, no acute distress    EENT:  EOMI. Anicteric sclerae. MMM  Resp:  CTA bilaterally, no wheezing or rales. No accessory muscle use  CV:  Regular  rhythm,  No edema  GI:  Soft, Non distended, Non tender. +Bowel sounds  Neurologic:  Alert and oriented X 3, normal speech,   Psych:   Good insight. Not anxious nor agitated  Skin:  No rashes. No jaundice    Reviewed most current lab test results and cultures  YES  Reviewed most current radiology test results   YES  Review and summation of old records today    NO  Reviewed patient's current orders and MAR    YES  PMH/SH reviewed - no change compared to H&P  ________________________________________________________________________  Care Plan discussed with:    Comments   Patient x    Family      RN     Care Manager     Consultant                        Multidiciplinary team rounds were held today with , nursing, pharmacist and clinical coordinator. Patient's plan of care was discussed; medications were reviewed and discharge planning was addressed.      ________________________________________________________________________  Total NON critical care TIME:  25    Minutes    Total CRITICAL CARE TIME Spent:   Minutes non procedure based      Comments   >50% of visit spent in counseling and coordination of care x     This includes time during multidisciplinary rounds if indicated above ________________________________________________________________________  Roger Shin MD     Procedures: see electronic medical records for all procedures/Xrays and details which were not copied into this note but were reviewed prior to creation of Plan. LABS:  I reviewed today's most current labs and imaging studies.   Pertinent labs include:  Recent Labs     06/16/21  0146 06/15/21  1842   WBC 5.9 6.4   HGB 13.2 13.7   HCT 39.2 41.0    174     Recent Labs     06/18/21  0706 06/17/21  0313 06/16/21  0320 06/16/21  0038 06/15/21  1842   * 136 134*  --  133*   K 3.5 2.8* 3.0*  --  3.3*   CL 97 94* 92*  --  88*   CO2 33* 38* 39*  --  41*   GLU 72 76 78  --  137*   BUN 15 18 20  --  21*   CREA 0.90 0.97 1.11*  --  1.32*   CA 9.0 9.1 8.9  --  9.5   MG 2.0 2.0  --   --   --    ALB  --  2.7*  --   --  3.1*   TBILI  --  0.9  --   --  0.9   ALT  --  46  --   --  73   INR  --   --  1.1 1.1  --

## 2021-06-18 NOTE — PROGRESS NOTES
GI PROGRESS NOTE  Fatimah Clarke NP  [phone] NP in-hospital cell phone M-F until 4:30  After 5pm or on weekends, please call  for physician on call    NAME:Ciara Lew :3/16/1930 QBT:[id number]   ATTG: Dr. Alfie Galindo   PCP: Abner Candelaria MD  Date/Time:  2021 10:19 AM   Primary GI: Dr. Trevor Edge; Dr. Gary Liz covering   Reason for following: Abnormal CT findings of GI tract     Assessment:     Cholelithiasis   · CT abd/pel showed cholelithiasis, with dilated common bile duct 12 mm. Choledocholithiasis as well is irregular wall thickening of the distal common bile duct. Cannot exclude cholangiocarcinoma  · AST 82, ; trending down   · MRI/MRCP:  Cholelithiasis with numerous common hepatic and common bile duct stones     Atrial fibrillation  CAD  · On ASA  · Treatment per primary team      Plan:     · Per palliative, daughter states that patient has been vomiting after all meals. Will order upper GI series to assess  · Spoke with daughter and son-in-law over the phone and updated them on MRCP findings. Recommended outpatient follow-up with Dr. Trevor Edge to assess the need for possible ERCP as outpatient. Family agreeable. · Follow LFT's  · Clear liquid diet; advance diet as tolerated   · Symptomatic care per primary team   *Plan of care discussed with Dr. Ivelisse Kimball:   Discussed with RN events overnight. Patient resting in bed. Pleasantly confused. No new complaints. Review of Systems:  Symptom Y/N Comments  Symptom Y/N Comments   Fever/Chills n   Chest Pain n    Cough n   Headaches n    Sputum n   Joint Pain n    SOB/YANES n   Pruritis/Rash n    Tolerating Diet y   Other       Could NOT obtain due to:      Objective:   VITALS:   Last 24hrs VS reviewed since prior progress note.  Most recent are:  Visit Vitals  BP (!) 114/56   Pulse 76   Temp 97.4 °F (36.3 °C)   Resp 18   Ht 5' 4\" (1.626 m)   Wt 75.8 kg (167 lb)   SpO2 92%   BMI 28.67 kg/m²       Intake/Output Summary (Last 24 hours) at 6/18/2021 1117  Last data filed at 6/18/2021 0305  Gross per 24 hour   Intake 233 ml   Output    Net 233 ml     PHYSICAL EXAM:  General: No acute distress. Pleasant elderly AA female. HEENT: NC, Atraumatic. Anicteric sclerae. Lungs:  LS clear. No Wheezing/Rhonchi/Rales. Heart:  RRR,  No murmur, No Rubs, No Gallops  Abdomen: Soft, Non distended, Non tender. +Bowel sounds, no HSM  Extremities: No c/c/e  Neurologic:  Alert and confused. No acute neurological distress   Psych:   Little to no insight. Not anxious nor agitated. Lab and Radiology Data Reviewed: (see below)    Medications Reviewed: (see below)  PMH/SH reviewed - no change compared to H&P  ________________________________________________________________________  Total time spent with patient: 25 minutes ________________________________________________________________________  Care Plan discussed with:  Patient x   Family     RN x              Consultant:       Marcel Hamman, NP     Procedures: see electronic medical records for all procedures/Xrays and details which were not copied into this note but were reviewed prior to creation of Plan. LABS:  Recent Labs     06/16/21  0146 06/15/21  1842   WBC 5.9 6.4   HGB 13.2 13.7   HCT 39.2 41.0    174     Recent Labs     06/18/21  0706 06/17/21  0313 06/16/21  0320   * 136 134*   K 3.5 2.8* 3.0*   CL 97 94* 92*   CO2 33* 38* 39*   BUN 15 18 20   CREA 0.90 0.97 1.11*   GLU 72 76 78   CA 9.0 9.1 8.9   MG 2.0 2.0  --      Recent Labs     06/17/21  0313 06/15/21  1842   * 218*   TP 7.5 8.9*   ALB 2.7* 3.1*   GLOB 4.8* 5.8*     Recent Labs     06/16/21  0320 06/16/21  0038   INR 1.1 1.1   PTP 11.9* 11.8*      No results for input(s): FE, TIBC, PSAT, FERR in the last 72 hours. No results found for: FOL, RBCF  No results for input(s): PH, PCO2, PO2 in the last 72 hours. No results for input(s): CPK, CKMB in the last 72 hours.     No lab exists for component: TROPONINI  Lab Results   Component Value Date/Time    Color YELLOW/STRAW 06/15/2021 11:18 PM    Appearance CLEAR 06/15/2021 11:18 PM    Specific gravity 1.005 06/15/2021 11:18 PM    Specific gravity 1.022 05/06/2019 07:58 PM    pH (UA) 6.0 06/15/2021 11:18 PM    Protein Negative 06/15/2021 11:18 PM    Glucose Negative 06/15/2021 11:18 PM    Ketone Negative 06/15/2021 11:18 PM    Bilirubin Negative 06/15/2021 11:18 PM    Urobilinogen 1.0 06/15/2021 11:18 PM    Nitrites Negative 06/15/2021 11:18 PM    Leukocyte Esterase SMALL (A) 06/15/2021 11:18 PM    Epithelial cells FEW 06/15/2021 11:18 PM    Bacteria 1+ (A) 06/15/2021 11:18 PM    WBC 5-10 06/15/2021 11:18 PM    RBC 0-5 06/15/2021 11:18 PM       MEDICATIONS:  Current Facility-Administered Medications   Medication Dose Route Frequency    metoprolol succinate (TOPROL-XL) XL tablet 25 mg  25 mg Oral DAILY    famotidine (PF) (PEPCID) 20 mg in 0.9% sodium chloride 10 mL injection  20 mg IntraVENous QHS    acetaminophen (TYLENOL) tablet 1,000 mg  1,000 mg Oral Q8H    ziprasidone (GEODON) 10 mg in sterile water (preservative free) 0.5 mL injection  10 mg IntraMUSCular Q8H PRN    ondansetron (ZOFRAN) injection 4 mg  4 mg IntraVENous Q1H PRN    sodium chloride (NS) flush 5-40 mL  5-40 mL IntraVENous Q8H    sodium chloride (NS) flush 5-40 mL  5-40 mL IntraVENous PRN    acetaminophen (TYLENOL) tablet 650 mg  650 mg Oral Q6H PRN    Or    acetaminophen (TYLENOL) suppository 650 mg  650 mg Rectal Q6H PRN    polyethylene glycol (MIRALAX) packet 17 g  17 g Oral DAILY PRN    ondansetron (ZOFRAN ODT) tablet 4 mg  4 mg Oral Q8H PRN    Or    ondansetron (ZOFRAN) injection 4 mg  4 mg IntraVENous Q6H PRN    0.9% sodium chloride infusion  50 mL/hr IntraVENous CONTINUOUS

## 2021-06-18 NOTE — CONSULTS
Palliative Medicine Consult  Sal: [phone] ([phone])    Patient Name: Zoya Reyes  YOB: 1930    Date of Initial Consult: 6/17/2021  Reason for Consult: care decisions  Requesting Provider: Dr. John Mcknight  Primary Care Physician: Darcy Browne MD     SUMMARY:   Zoya Reyes is a 80 y.o. with a past history of Dementia, Hx CVA, chronic afib, CAD s/p 1 stent, GERD, breast cancer s/p R mastectomy  who was admitted on 6/15/2021 from home  with a diagnosis of post prandial vomiting for one month, 30 pound weight loss . Current medical issues leading to Palliative Medicine involvement include: CT abd/pelvis show cholelithiasis, and dilater CBD 12mm, irregular thickening of distal CBD, cannot exclude cholangiocarcinoma. Patient was to have MRI/ MRCP yesterday but refused (she has dementia, does not remember doing this) and it's rescheduled today, with daughter at bedside to encourage her to cooperate with MRI. Chronic headaches. Head CT negative. Patient lives with her daughter Alexis Bar and Son jhony. One level home but 2 steps. She ambulates slowly with walker. She spends all day up in recliner watching TV. Daughter assist patient with ADLs, manages medications. PALLIATIVE DIAGNOSES:   1. Nausea with vomiting after solid food. 2. 30 pound weight loss. hypoalbuminemia  3. Gallstones. Dilated CBD, irregular wall distal CBD, further workup pending. 4. Patient does not demonstrate decision making capacity. Use MPOA, daughter Alexis Bar  5. Dementia- poor historian  6. Chronic headaches        PLAN:   1. Palliative Medicine introduced to patient, daughter Rudy Kawasaki as added layer of support. 2. Discussed continued GI workup with GI team today  3.  Our team will follow up again on Monday    GOALS OF CARE:   to be further discussed    TREATMENT PREFERENCES:   Code Status: Full Code    Advance Care Planning:  [] The [facility] Interdisciplinary Team has updated the ACP Navigator with Health Care Decision Maker and Patient Capacity      Advance Care Planning 6/16/2021   Patient's Healthcare Decision Maker is: -   Primary Decision Maker Name -   Primary Decision Maker Phone Number -   Primary Decision Maker Relationship to Patient -   Secondary Decision Maker Name -   Secondary Decision Maker Phone Number -   Secondary Decision Maker Relationship to Patient -   Confirm Advance Directive Yes, on file   Patient Would Like to Complete Advance Directive -             Other Instructions: Other:    As far as possible, the palliative care team has discussed with patient / health care proxy about goals of care / treatment preferences for patient. HISTORY:     History obtained from:   daughter  CHIEF COMPLAINT:  vomiting    HPI/SUBJECTIVE:    The patient is:   [x] Verbal and participatory  [] Non-participatory due to:     80year old female with dementia, admitted with one month of vomiting. Able to keep down water, gingerail, most clear liquids. Cannot keep down soup or solid food. Vomits immediately after eating any solid food. This has been ongoing for about one month    Also having headaches in the morning.   Getting weaker, not able to ambulate as well    Clinical Pain Assessment (nonverbal scale for severity on nonverbal patients):   Clinical Pain Assessment  Severity: 1          Duration: for how long has pt been experiencing pain (e.g., 2 days, 1 month, years)  Frequency: how often pain is an issue (e.g., several times per day, once every few days, constant)     FUNCTIONAL ASSESSMENT:     Palliative Performance Scale (PPS):  PPS: 40       PSYCHOSOCIAL/SPIRITUAL SCREENING:     Palliative IDT has assessed this patient for cultural preferences / practices and a referral made as appropriate to needs (Cultural Services, Patient Advocacy, Ethics, etc.)    Any spiritual / Temple concerns:  [] Yes /  [x] No    Caregiver Burnout:  [] Yes /  [x] No /  [] No Caregiver Present      Anticipatory grief assessment:   [x] Normal  / [] Maladaptive       ESAS Anxiety: Anxiety: 0    ESAS Depression:          REVIEW OF SYSTEMS:     Positive and pertinent negative findings in ROS are noted above in HPI. The following systems were [x] reviewed / [] unable to be reviewed as noted in HPI  Other findings are noted below. Systems: constitutional, ears/nose/mouth/throat, respiratory, gastrointestinal, genitourinary, musculoskeletal, integumentary, neurologic, psychiatric, endocrine. Positive findings noted below. Modified ESAS Completed by: provider           Pain: 1   Anxiety: 0 Nausea: 2   Anorexia: 2 Dyspnea: 0     Constipation: No     Stool Occurrence(s): 1        PHYSICAL EXAM:     From RN flowsheet:  Wt Readings from Last 3 Encounters:   06/15/21 75.8 kg (167 lb)   05/06/19 68 kg (150 lb)   04/26/16 99.8 kg (220 lb)     Blood pressure (!) 112/53, pulse 72, temperature 97.7 °F (36.5 °C), resp. rate 18, height 5' 4\" (1.626 m), weight 75.8 kg (167 lb), SpO2 100 %. Pain Scale 1: Numeric (0 - 10)  Pain Intensity 1: 0                 Last bowel movement, if known:     Constitutional: pleasant elderly female NAD  Eyes: pupils equal, anicteric  No respiratory distress. Speech is fluent, affect is normal  Oriented to name, place. Does not know her age, birthday, season of the year  Does not remember refusing MRI test yesterday.        HISTORY:     Active Problems:    Anorexia (6/15/2021)      Hypokalemia (6/15/2021)      Cholelithiases (6/15/2021)      Nausea & vomiting (6/15/2021)      NADIA (acute kidney injury) ([address] (6/15/2021)      Past Medical History:   Diagnosis Date    Acid reflux     Arthritis     CAD (coronary artery disease)     MI, no cardiologist at this time    Cancer [facility])     breast cancer    [facility] [address] [address]     Hypertension     Stroke ([facility] [address]     approx 15 years prior per daughter (2000)      Past Surgical History:   Procedure Laterality Date    HX GYN      x6 vaginal births    [place] BREAST SURGERY PROCEDURE UNLISTED      right breast removal due to cancer    [place] CARDIAC SURG PROCEDURE UNLIST      x1 stent placed      History reviewed. No pertinent family history. History reviewed, no pertinent family history.   Social History     Tobacco Use    Smoking status: Never Smoker    Smokeless tobacco: Never Used   Substance Use Topics    Alcohol use: No     No Known Allergies   Current Facility-Administered Medications   Medication Dose Route Frequency    metoprolol succinate (TOPROL-XL) XL tablet 25 mg  25 mg Oral DAILY    famotidine (PF) (PEPCID) 20 mg in 0.9% sodium chloride 10 mL injection  20 mg IntraVENous QHS    acetaminophen (TYLENOL) tablet 1,000 mg  1,000 mg Oral Q8H    ziprasidone (GEODON) 10 mg in sterile water (preservative free) 0.5 mL injection  10 mg IntraMUSCular Q8H PRN    ondansetron (ZOFRAN) injection 4 mg  4 mg IntraVENous Q1H PRN    sodium chloride (NS) flush 5-40 mL  5-40 mL IntraVENous Q8H    sodium chloride (NS) flush 5-40 mL  5-40 mL IntraVENous PRN    acetaminophen (TYLENOL) tablet 650 mg  650 mg Oral Q6H PRN    Or    acetaminophen (TYLENOL) suppository 650 mg  650 mg Rectal Q6H PRN    polyethylene glycol (MIRALAX) packet 17 g  17 g Oral DAILY PRN    ondansetron (ZOFRAN ODT) tablet 4 mg  4 mg Oral Q8H PRN    Or    ondansetron (ZOFRAN) injection 4 mg  4 mg IntraVENous Q6H PRN    0.9% sodium chloride infusion  50 mL/hr IntraVENous CONTINUOUS          LAB AND IMAGING FINDINGS:     Lab Results   Component Value Date/Time    WBC 5.9 06/16/2021 01:46 AM    HGB 13.2 06/16/2021 01:46 AM    PLATELET 494 42/07/5060 01:46 AM     Lab Results   Component Value Date/Time    Sodium 134 (L) 06/18/2021 07:06 AM    Potassium 3.5 06/18/2021 07:06 AM    Chloride 97 06/18/2021 07:06 AM    CO2 33 (H) 06/18/2021 07:06 AM    BUN 15 06/18/2021 07:06 AM    Creatinine 0.90 06/18/2021 07:06 AM    Calcium 9.0 06/18/2021 07:06 AM    Magnesium 2.0 06/18/2021 07:06 AM Lab Results   Component Value Date/Time    Alk. phosphatase 173 (H) 06/17/2021 03:13 AM    Protein, total 7.5 06/17/2021 03:13 AM    Albumin 2.7 (L) 06/17/2021 03:13 AM    Globulin 4.8 (H) 06/17/2021 03:13 AM     Lab Results   Component Value Date/Time    INR 1.1 06/16/2021 03:20 AM    Prothrombin time 11.9 (H) 06/16/2021 03:20 AM    aPTT 22.6 12/20/2018 04:09 PM      No results found for: IRON, FE, TIBC, IBCT, PSAT, FERR   No results found for: PH, PCO2, PO2  No components found for: [facility]   Lab Results   Component Value Date/Time    CK 39 12/20/2018 04:09 PM    CK - MB <0.5 (L) 04/26/2016 05:05 PM                Total time:   Counseling / coordination time, spent as noted above:   > 50% counseling / coordination?:     Prolonged service was provided for  []30 min   []75 min in face to face time in the presence of the patient, spent as noted above. Time Start:   Time End:   Note: this can only be billed with 66320 (initial) or 96823 (follow up). If multiple start / stop times, list each separately.

## 2021-06-18 NOTE — PROGRESS NOTES
Problem: Mobility Impaired (Adult and Pediatric)  Goal: *Acute Goals and Plan of Care (Insert Text)  Description: FUNCTIONAL STATUS PRIOR TO ADMISSION: The patient is a poor historian. Patient was modified independent using a walker for functional mobility. HOME SUPPORT PRIOR TO ADMISSION: The patient lived with daughter who assists her. Physical Therapy Goals  Initiated 2021  1. Patient will move from supine to sit and sit to supine  in bed with modified independence within 7 day(s). 2.  Patient will transfer from bed to chair and chair to bed with minimal assistance using the least restrictive device within 7 day(s). 3.  Patient will perform sit to stand with modified independence within 7 day(s). 4.  Patient will ambulate with minimal assistance for 50 feet with the least restrictive device within 7 day(s). Outcome: Progressing Towards Goal   PHYSICAL THERAPY EVALUATION  Patient: Gricel Nash (81 y.o. female)  Date: 2021  Primary Diagnosis: Nausea & vomiting [R11.2]  Anorexia [R63.0]  Cholelithiases [K80.20]  NADIA (acute kidney injury) ([facility] [address] [N17.9]  Hypokalemia [E87.6]        Precautions:   Fall    ASSESSMENT  Based on the objective data described below, the patient presents with decreased independence with functional mobility S/P cholelithiases, NADIA, and hyponatremia with nausea and vomiting. Patient transitions supine to sit at EOB and demonstrates good static sitting balance. She reports dizziness in sitting and standing. Due to BP ambulation is deferred. Supine: 120/60  Sittin/80  Standin/62    Current Level of Function Impacting Discharge (mobility/balance): Min A     Functional Outcome Measure: The patient scored 25/100 on the Barthel outcome measure.     Other factors to consider for discharge: medical stability, decreased independence, increased need for assistance, cognitive status      Patient will benefit from skilled therapy intervention to address the above noted impairments. PLAN :  Recommendations and Planned Interventions: bed mobility training, transfer training, gait training, therapeutic exercises, neuromuscular re-education, edema management/control, patient and family training/education, and therapeutic activities      Frequency/Duration: Patient will be followed by physical therapy:  5 times a week to address goals. Recommendation for discharge: (in order for the patient to meet his/her long term goals)  To be determined: pending progress with acute care    This discharge recommendation:  Has not yet been discussed the attending provider and/or case management    IF patient discharges home will need the following DME: to be determined (TBD)         SUBJECTIVE:   Patient stated I just feel bad.     OBJECTIVE DATA SUMMARY:   HISTORY:    Past Medical History:   Diagnosis Date    Acid reflux     Arthritis     CAD (coronary artery disease)     MI, no cardiologist at this time    Cancer [facility])     breast cancer    Dementia ([facility] [address]     Hypertension     Stroke ([facility] [address]     approx 15 years prior per daughter (2000)     Past Surgical History:   Procedure Laterality Date    HX GYN      x6 vaginal births    [place] BREAST SURGERY PROCEDURE UNLISTED      right breast removal due to cancer    ME CARDIAC SURG PROCEDURE UNLIST      x1 stent placed       Personal factors and/or comorbidities impacting plan of care: please see above    Home Situation  Home Environment: Private residence  # Steps to Enter: 0  One/Two Story Residence: One story  Living Alone: No  Support Systems: Family member(s)  Patient Expects to be Discharged to[de-identified] [employer]  Current DME Used/Available at Home: Commode, bedside, Cane, straight, Walker, rolling    EXAMINATION/PRESENTATION/DECISION MAKING:   Critical Behavior:  Neurologic State: Alert, Confused  Orientation Level: Oriented to person, Oriented to place, Disoriented to situation, Disoriented to time  Cognition: Follows commands Hearing: Auditory  Auditory Impairment: Hard of hearing, bilateral  Skin:    Edema:   Range Of Motion:  AROM: Generally decreased, functional           PROM: Generally decreased, functional           Strength:    Strength: Generally decreased, functional                    Tone & Sensation:   Tone: Normal              Sensation: Intact               Coordination:  Coordination: Within functional limits  Vision:      Functional Mobility:  Bed Mobility:     Supine to Sit: Bed Modified;Contact guard assistance  Sit to Supine: Moderate assistance  Scooting: Stand-by assistance  Transfers:  Sit to Stand: Minimum assistance  Stand to Sit: Minimum assistance                       Balance:   Sitting: Impaired; Without support  Sitting - Static: Good (unsupported)  Sitting - Dynamic: Not tested  Standing: With support; Impaired  Standing - Static: Fair  Standing - Dynamic : Not tested  Ambulation/Gait Training:                                                         Stairs: Therapeutic Exercises:       Functional Measure:  Barthel Index:    Bathin  Bladder: 5  Bowels: 5  Groomin  Dressin  Feedin  Mobility: 0  Stairs: 0  Toilet Use: 0  Transfer (Bed to Chair and Back): 5  Total: 25/100       The Barthel ADL Index: Guidelines  1. The index should be used as a record of what a patient does, not as a record of what a patient could do. 2. The main aim is to establish degree of independence from any help, physical or verbal, however minor and for whatever reason. 3. The need for supervision renders the patient not independent. 4. A patient's performance should be established using the best available evidence. Asking the patient, friends/relatives and nurses are the usual sources, but direct observation and common sense are also important. However direct testing is not needed.   5. Usually the patient's performance over the preceding 24-48 hours is important, but occasionally longer periods will be relevant. 6. Middle categories imply that the patient supplies over 50 per cent of the effort. 7. Use of aids to be independent is allowed. Dorothy Stewart., Barthel, NADIYA. (4289). Functional evaluation: the Barthel Index. [address] [facility] (14)2. JOHN PAUL Zamora, Phuong Lamar.Edwin., Linda Wilhelm, [address] [facility] (1999). Measuring the change indisability after inpatient rehabilitation; comparison of the responsiveness of the Barthel Index and Functional [place] Measure. Journal of Neurology, Neurosurgery, and Psychiatry, 66(4), 468-770. Raymon Kirkland, N.J.A, CEE Fuller, & Reinaldo De Souza, M.A. (2004.) Assessment of post-stroke quality of life in cost-effectiveness studies: The usefulness of the Barthel Index and the EuroQoL-5D. Quality of Life Research, 15, 076-48            Physical Therapy Evaluation Charge Determination   History Examination Presentation Decision-Making   HIGH Complexity :3+ comorbidities / personal factors will impact the outcome/ POC  LOW Complexity : 1-2 Standardized tests and measures addressing body structure, function, activity limitation and / or participation in recreation  MEDIUM Complexity : Evolving with changing characteristics  Other outcome measures Barthel  HIGH       Based on the above components, the patient evaluation is determined to be of the following complexity level: HIGH     Pain Rating:      Activity Tolerance:   Poor and signs and symptoms of orthostatic hypotension    After treatment patient left in no apparent distress:   Supine in bed, Call bell within reach, Bed / chair alarm activated, and Side rails x 3    COMMUNICATION/EDUCATION:   The patients plan of care was discussed with: Registered nurse and Rehabilitation technician. Fall prevention education was provided and the patient/caregiver indicated understanding., Patient/family have participated as able in goal setting and plan of care. , and Patient/family agree to work toward stated goals and plan of care.     Thank you for this referral.  Stacia Hayes, PT   Time Calculation: 21 mins

## 2021-06-18 NOTE — PROGRESS NOTES
Bedside and Verbal shift change report given to Ruth Joseph (oncoming nurse) by Andreia Ortiz (offgoing nurse). Report included the following information SBAR.

## 2021-06-18 NOTE — PROGRESS NOTES
Comprehensive Nutrition Assessment    Type and Reason for Visit: Initial, NPO/clear liquid    Nutrition Recommendations/Plan:   Advance diet as medically able per GI  RD to add Ensure Clear TID  Antiemetics prn     Nutrition Assessment:   Pt admitted with n/v, anorexia, cholelithiasis. PMH: dementia, CVA, CAD, GERD, breast CA. Chart reviewed, pt lying in bed awake and alert with covers pulled up. She is very confused, unable to participate in interview. No family at the bedside at time of visit. GI is following, plans for upper GI as family reports she is vomiting after meals (although no vomiting recorded in RN notes or I/O's). Pt has prn zofran available but has not taken any in the past 3 days. Per H&P she has had 30lb wt loss. Unable to determine malnutrition status at this time. Will add ensure clear TID for now as she remains on clear liquids only. Patient Vitals for the past 168 hrs:   % Diet Eaten   06/18/21 0305 0%   06/17/21 2019 0%       Malnutrition Assessment:  Malnutrition Status:  Insufficient data        Estimated Daily Nutrient Needs:  Energy (kcal): MSJ 1500 (1158 x 1.3); Weight Used for Energy Requirements: Current  Protein (g): 61-76g (0.8-1gPro/kg); Weight Used for Protein Requirements: Current  Fluid (ml/day): 1500mL; Method Used for Fluid Requirements: 1 ml/kcal      Nutrition Related Findings:  Meds: [email or web address]      Wounds:    None       Current Nutrition Therapies:  ADULT DIET Clear Liquid  DIET NPO  ADULT ORAL NUTRITION SUPPLEMENT Breakfast, Lunch, Dinner; Clear Liquid    Anthropometric Measures:  · Height:  5' 4\" (162.6 cm)  · Current Body Wt:  75.8 kg (167 lb 1.7 oz)   · Ideal Body Wt:  120 lbs:  139.3 %   · BMI Category: Overweight (BMI 25.0-29. 9)       Nutrition Diagnosis:   · Inadequate protein-energy intake related to cognitive or neurological impairment, inadequate protein-energy intake, altered GI function as evidenced by NPO or clear liquid status due to medical condition, weight loss      Nutrition Interventions:   Food and/or Nutrient Delivery: Continue current diet, Start oral nutrition supplement  Nutrition Education and Counseling: No recommendations at this time  Coordination of Nutrition Care: Continue to monitor while inpatient    Goals:  Pt will advance past clears and consume >25% of meals/supplements in 2-4 days with no vomiting. Nutrition Monitoring and Evaluation:   Behavioral-Environmental Outcomes: None identified  Food/Nutrient Intake Outcomes: Food and nutrient intake, Supplement intake, Diet advancement/tolerance  Physical Signs/Symptoms Outcomes: Biochemical data, Nutrition focused physical findings, Skin, Weight, GI status    Discharge Planning:     Too soon to determine     Electronically signed by Michelle Stephens RD, [address]  on 6/18/2021 at 3:27 PM    Contact: [phone]

## 2021-06-18 NOTE — PROGRESS NOTES
End of Shift Note    Bedside shift change report given [place] [facility]) by Ebony Tamayo RN (offgoing nurse). Report included the following information SBAR, Kardex, Intake/Output, MAR and Recent Results    Shift worked:  7a-7p     Shift summary and any significant changes:     family at bedside majority of shift. Helped ambulate to the bathroom x2 assist. Pt complained of leg pain after. Potassium given this am.     Concerns for physician to address:  none     Zone phone for oncoming shift:   [phone]       Activity:  Activity Level: Up with Assistance  Number times ambulated in hallways past shift: 0  Number of times OOB to chair past shift: 0    Cardiac:   Cardiac Monitoring: No      Cardiac Rhythm: Atrial Fib    Access:   Current line(s): no access     Genitourinary:   Urinary status: voiding and incontinent    Respiratory:   O2 Device: None (Room air)  Chronic home O2 use?: NO  Incentive spirometer at bedside: YES     GI:     Current diet:  ADULT DIET Clear Liquid  Passing flatus: YES  Tolerating current diet: YES       Pain Management:   Patient states pain is manageable on current regimen: YES    Skin:  Justin Score: 18  Interventions: float heels and increase time out of bed    Patient Safety:  Fall Score:  Total Score: 3  Interventions: bed/chair alarm, gripper socks, pt to call before getting OOB and stay with me (per policy)  High Fall Risk: Yes    Length of Stay:  Expected LOS: 3d 0h  Actual LOS: 2      Ebony Tamayo RN

## 2021-06-18 NOTE — PROGRESS NOTES

## 2021-06-19 PROCEDURE — 74011250637 HC RX REV CODE- 250/637: Performed by: INTERNAL MEDICINE

## 2021-06-19 PROCEDURE — 65270000029 HC RM PRIVATE

## 2021-06-19 PROCEDURE — 94760 N-INVAS EAR/PLS OXIMETRY 1: CPT

## 2021-06-19 RX ADMIN — ACETAMINOPHEN 1000 MG: 500 TABLET, FILM COATED ORAL at 13:21

## 2021-06-19 RX ADMIN — ACETAMINOPHEN 1000 MG: 500 TABLET, FILM COATED ORAL at 06:29

## 2021-06-19 RX ADMIN — ACETAMINOPHEN 1000 MG: 500 TABLET, FILM COATED ORAL at 21:01

## 2021-06-19 RX ADMIN — METOPROLOL SUCCINATE 25 MG: 25 TABLET, EXTENDED RELEASE ORAL at 08:57

## 2021-06-19 NOTE — PROGRESS NOTES
Hospitalist Progress Note    NAME: Khushboo Rdz   :  3/16/1930   MRN:  [id number]     Interim Hospital Summary: 80 y.o. female whom presented on 6/15/2021 with      Assessment / Plan:    Cholelithiasis   Choledocholithiasis   Nausea vomiting  Anorexia and weight loss  -CT abdomen  1. Cholelithiasis, with dilated common bile duct 12 mm. Choledocholithiasis as  well is irregular wall thickening of the distal common bile duct. Cannot exclude  cholangiocarcinoma. 2.  Uterine fibroids. -MRCP: Cholelithiasis with numerous common hepatic and common bile duct stones. -UGIS pending  -Follow up with Dr Brina Lacy as OP for ERCP. -advance diet to full liquids. No reported vomiting overnight and this AM.    -PT OT eval and treat. Atrial fibrillation, probably chronic  CAD  -Holding aspirin and Coumadin in anticipation of possible need for procedures  -Lisinopril, Lasix held due to soft blood pressure.   -restarted metoprolol    Dementia    25.0 - 29.9 Overweight / Body mass index is 28.67 kg/m². Estimated discharge date:   Barriers:    Code status: Full  Prophylaxis: SCD's  Recommended Disposition:  PT, OT, RN       Subjective:     Chief Complaint / Reason for Physician Visit  Follow up of gallstones, anorexia, dilated common bile duct, A. fib  Chart reviewed in detail. Discussed with RN events overnight. Review of Systems:  Symptom Y/N Comments  Symptom Y/N Comments   Fever/Chills    Chest Pain     Poor Appetite    Edema     Cough    Abdominal Pain     Sputum    Joint Pain     SOB/YANES    Pruritis/Rash     Nausea/vomit    Tolerating PT/OT     Diarrhea    Tolerating Diet     Constipation    Other       Could NOT obtain due to:      PO intake: No data found. Objective:     VITALS:   Last 24hrs VS reviewed since prior progress note.  Most recent are:  Patient Vitals for the past 24 hrs:   Temp Pulse Resp BP SpO2   21 0815 98.5 °F (36.9 °C) 69 16 110/60 98 %   06/19/21 0348 98.3 °F (36.8 °C) 90 17 110/60 99 %   06/18/21 2345 98 °F (36.7 °C) 85 17 120/69 96 %   06/18/21 2020 98.1 °F (36.7 °C) 77 17 133/64 100 %   06/18/21 1436 97.6 °F (36.4 °C) 77 18 129/60 100 %   06/18/21 1106 97.7 °F (36.5 °C) 72 18 (!) 112/53 100 %       Intake/Output Summary (Last 24 hours) at 6/19/2021 1056  Last data filed at 6/18/2021 2020  Gross per 24 hour   Intake 0 ml   Output    Net 0 ml        I had a face to face encounter, and independently examined this patient on 6/19/2021, as outlined below:  PHYSICAL EXAM:  General: WD, WN. Alert, cooperative, no acute distress    EENT:  EOMI. Anicteric sclerae. MMM  Resp:  CTA bilaterally, no wheezing or rales. No accessory muscle use  CV:  Regular  rhythm,  No edema  GI:  Soft, Non distended, Non tender. +Bowel sounds  Neurologic:  Alert and oriented X 3, normal speech,   Psych:   Good insight. Not anxious nor agitated  Skin:  No rashes. No jaundice    Reviewed most current lab test results and cultures  YES  Reviewed most current radiology test results   YES  Review and summation of old records today    NO  Reviewed patient's current orders and MAR    YES  PMH/SH reviewed - no change compared to H&P  ________________________________________________________________________  Care Plan discussed with:    Comments   Patient x    Family      RN     Care Manager     Consultant                        Multidiciplinary team rounds were held today with , nursing, pharmacist and clinical coordinator. Patient's plan of care was discussed; medications were reviewed and discharge planning was addressed.      ________________________________________________________________________  Total NON critical care TIME:  25    Minutes    Total CRITICAL CARE TIME Spent:   Minutes non procedure based      Comments   >50% of visit spent in counseling and coordination of care x     This includes time during multidisciplinary rounds if indicated above   ________________________________________________________________________  Chester Alpers, MD     Procedures: see electronic medical records for all procedures/Xrays and details which were not copied into this note but were reviewed prior to creation of Plan. LABS:  I reviewed today's most current labs and imaging studies. Pertinent labs include:  No results for input(s): WBC, HGB, HCT, PLT, HGBEXT, HCTEXT, PLTEXT, HGBEXT, HCTEXT, PLTEXT in the last 72 hours.   Recent Labs     06/18/21  0706 06/17/21  0313   * 136   K 3.5 2.8*   CL 97 94*   CO2 33* 38*   GLU 72 76   BUN 15 18   CREA 0.90 0.97   CA 9.0 9.1   MG 2.0 2.0   ALB  --  2.7*   TBILI  --  0.9   ALT  --  46

## 2021-06-19 NOTE — PROGRESS NOTES
End of Shift Note    Bedside shift change report given to [facility] (oncoming nurse) by Rosio Loyd LPN(offgoing nurse). Report included the following information SBAR, Kardex, Intake/Output, MAR and Recent Results    Shift worked: 8785-4094     Shift summary and any significant changes:    Patient is confused and tried to get out of bed multiple times. Safety precautions are in place and functioning. Unable to obtain AM labs. Patient has no IV access. MD aware. Concerns for physician to address:       Zone phone for oncoming shift:  [phone]         Activity:  Activity Level: Up with Assistance  Number times ambulated in hallways past shift: 0  Number of times OOB to chair past shift: 0    Cardiac:   Cardiac Monitoring: No      Cardiac Rhythm: Atrial Fib    Access:   Current line(s)NO IV Access    Genitourinary:   Urinary status: incontinent    Respiratory:   O2 Device: None (Room air)  Chronic home O2 use?: N/A  Incentive spirometer at bedside: NO     GI:     Current diet:  ADULT DIET Clear Liquid  DIET NPO  ADULT ORAL NUTRITION SUPPLEMENT Breakfast, Lunch, Dinner; Clear Liquid  Passing flatus: YES  Tolerating current diet: YES       Pain Management:   Patient states pain is manageable on current regimen: YES    Skin:  Justin Score: 17  Interventions: float heels, increase time out of bed and nutritional support     Patient Safety:  Fall Score:  Total Score: 4  Interventions: bed/chair alarm, gripper socks and pt to call before getting OOB  High Fall Risk: Yes    Length of Stay:  Expected LOS: 3d 0h  Actual LOS: [address] LPN

## 2021-06-19 NOTE — PROGRESS NOTES
End of Shift Note    Bedside shift change report given to Aleksandr Evangelista, [facility] (oncoming nurse) by Ruddy Chery RN (offgoing nurse). Report included the following information SBAR, Kardex, ED Summary, Intake/Output, MAR and Recent Results    Shift worked:  7a-7p     Shift summary and any significant changes:    Patient rested in bed this shift. No complaints of pain. Some confusion but no behavior issues. Concerns for physician to address:  n/a     [facility] phone for oncoming shift:   [phone]       Activity:  Activity Level: Up with Assistance  Number times ambulated in hallways past shift: 0  Number of times OOB to chair past shift: 0    Cardiac:   Cardiac Monitoring: Not applicable      Cardiac Rhythm: Atrial Fib    Access:   Current line(s): no access     Genitourinary:   Urinary status: incontinent    Respiratory:   O2 Device: None (Room air)  Chronic home O2 use?: N/A  Incentive spirometer at bedside: YES     GI:     Current diet:  DIET NPO  ADULT ORAL NUTRITION SUPPLEMENT Breakfast, Lunch, Dinner; Clear Liquid  ADULT DIET Full Liquid  Passing flatus: YES  Tolerating current diet: YES       Pain Management:   Patient states pain is manageable on current regimen: N/A    Skin:  Justin Score: 17  Interventions: turn team, speciality bed, float heels, increase time out of bed, foam dressing, PT/OT consult and limit briefs    Patient Safety:  Fall Score:  Total Score: 4  Interventions: bed/chair alarm, assistive device (walker, cane, etc), gripper socks, pt to call before getting OOB, stay with me (per policy) and gait belt  High Fall Risk: Yes    Length of Stay:  Expected LOS: 3d 0h  Actual LOS: [address], RN

## 2021-06-20 PROCEDURE — 65270000029 HC RM PRIVATE

## 2021-06-20 PROCEDURE — 74011250637 HC RX REV CODE- 250/637: Performed by: INTERNAL MEDICINE

## 2021-06-20 PROCEDURE — 74011250637 HC RX REV CODE- 250/637: Performed by: HOSPITALIST

## 2021-06-20 RX ADMIN — METOPROLOL SUCCINATE 25 MG: 25 TABLET, EXTENDED RELEASE ORAL at 08:27

## 2021-06-20 RX ADMIN — ACETAMINOPHEN 1000 MG: 500 TABLET, FILM COATED ORAL at 13:13

## 2021-06-20 RX ADMIN — ACETAMINOPHEN 650 MG: 325 TABLET ORAL at 20:00

## 2021-06-20 RX ADMIN — ACETAMINOPHEN 1000 MG: 500 TABLET, FILM COATED ORAL at 05:40

## 2021-06-20 NOTE — PROGRESS NOTES
End of Shift Note    Bedside shift change report given to HARRIET Morrissey (oncoming nurse) by Kusum Oconnor RN (offgoing nurse). Report included the following information SBAR, Kardex, Intake/Output, MAR, Recent Results and Med Rec Status    Shift worked:  [id number]     Shift summary and any significant changes:     pt with no complaints through shift. Alert to self and place only. Incontinent of bladder this shift. No IV access and Dr's are aware. Bed alarm activated. Concerns for physician to address:       Zone phone for oncoming shift:   [phone]       Activity:  Activity Level: Bed Rest  Number times ambulated in hallways past shift: 0  Number of times OOB to chair past shift: 0    Cardiac:   Cardiac Monitoring: No      Cardiac Rhythm: Atrial Fib    Access:   Current line(s): no access     Genitourinary:   Urinary status: incontinent    Respiratory:   O2 Device: None (Room air)  Chronic home O2 use?: NO  Incentive spirometer at bedside: NO     GI:     Current diet:  DIET NPO  ADULT ORAL NUTRITION SUPPLEMENT Breakfast, Lunch, Dinner; Clear Liquid  ADULT DIET Full Liquid  DIET ONE TIME MESSAGE  Passing flatus: YES  Tolerating current diet: YES       Pain Management:   Patient states pain is manageable on current regimen: YES    Skin:  Justin Score: 15  Interventions: turn team, foam dressing, PT/OT consult and nutritional support     Patient Safety:  Fall Score:  Total Score: 5  Interventions: bed/chair alarm, assistive device (walker, cane, etc), gripper socks and pt to call before getting OOB  High Fall Risk: Yes    Length of Stay:  Expected LOS: 3d 0h  Actual LOS: 5      Kusum Oconnor RN

## 2021-06-20 NOTE — PROGRESS NOTES
End of Shift Note    Bedside shift change report given to Luc Michael RN (oncoming nurse) by Adriana Mohan RN (offgoing nurse). Report included the following information SBAR, Kardex, ED Summary, Intake/Output, MAR and Recent Results    Shift worked:  7a-7p     Shift summary and any significant changes:    Patient rested well. Easily redirectable. No complaints of pain. Updated family multiple times today. Tolerating diet when hungry. Concerns for physician to address:  d/c? Xrays? Update family     Zone phone for oncoming shift:  [phone]     Activity:  Activity Level: Bed Rest  Number times ambulated in hallways past shift: 0  Number of times OOB to chair past shift: 1    Cardiac:   Cardiac Monitoring: Not applicable      Cardiac Rhythm: Atrial Fib    Access:   Current line(s): no access     Genitourinary:   Urinary status: incontinent    Respiratory:   O2 Device: None (Room air)  Chronic home O2 use?: N/A  Incentive spirometer at bedside: N/A     GI:     Current diet:  DIET NPO  ADULT ORAL NUTRITION SUPPLEMENT Breakfast, Lunch, Dinner; Clear Liquid  ADULT DIET Full Liquid  DIET ONE TIME MESSAGE  Passing flatus: YES  Tolerating current diet: YES       Pain Management:   Patient states pain is manageable on current regimen: N/A    Skin:  Justin Score: 15  Interventions: turn team, speciality bed, increase time out of bed, limit briefs and nutritional support     Patient Safety:  Fall Score:  Total Score: 4  Interventions: bed/chair alarm, assistive device (walker, cane, etc), gripper socks, pt to call before getting OOB, stay with me (per policy) and gait belt  High Fall Risk: Yes    Length of Stay:  Expected LOS: 3d 0h  Actual LOS: Adama Meyers RN

## 2021-06-20 NOTE — PROGRESS NOTES
Hospitalist Progress Note    NAME: Navin Segovia   :  3/16/1930   MRN:  [id number]     Interim Hospital Summary: 80 y.o. female whom presented on 6/15/2021 with      Assessment / Plan:    Cholelithiasis   Choledocholithiasis   Nausea vomiting  Anorexia and weight loss  -CT abdomen  1. Cholelithiasis, with dilated common bile duct 12 mm. Choledocholithiasis as  well is irregular wall thickening of the distal common bile duct. Cannot exclude  cholangiocarcinoma. 2.  Uterine fibroids. -MRCP: Cholelithiasis with numerous common hepatic and common bile duct stones.  -Follow up with Dr Saint Poche as OP for ERCP. -on FLD. Advance in AM if stable. UGIS pending  -PT OT eval and treat. Atrial fibrillation, probably chronic  CAD  -Holding aspirin   -Lisinopril, Lasix held due to soft blood pressure.   -continue metoprolol   -restart coumadin if no procedures planned    Dementia    25.0 - 29.9 Overweight / Body mass index is 28.67 kg/m². Estimated discharge date:   Barriers:    Code status: Full  Prophylaxis: SCD's  Recommended Disposition:  PT, OT, RN       Subjective:     Chief Complaint / Reason for Physician Visit  Follow up of gallstones, anorexia, dilated common bile duct, A. fib  Chart reviewed in detail. Discussed with RN events overnight. Review of Systems:  Symptom Y/N Comments  Symptom Y/N Comments   Fever/Chills    Chest Pain     Poor Appetite    Edema     Cough    Abdominal Pain     Sputum    Joint Pain     SOB/YANES    Pruritis/Rash     Nausea/vomit    Tolerating PT/OT     Diarrhea    Tolerating Diet     Constipation    Other       Could NOT obtain due to:      PO intake: No data found. Objective:     VITALS:   Last 24hrs VS reviewed since prior progress note.  Most recent are:  Patient Vitals for the past 24 hrs:   Temp Pulse Resp BP SpO2   21 0740 98.1 °F (36.7 °C) 77 20 113/74 94 %   21 0419 98.2 °F (36.8 °C) 78 18 134/71 96 %   06/19/21 2101 98 °F (36.7 °C) 74 21 110/84 97 %   06/19/21 1529 98.4 °F (36.9 °C) 70 20 108/62 95 %   06/19/21 1110 98.1 °F (36.7 °C) 70 20 113/66 96 %       Intake/Output Summary (Last 24 hours) at 6/20/2021 1049  Last data filed at 6/19/2021 1756  Gross per 24 hour   Intake 240 ml   Output    Net 240 ml        I had a face to face encounter, and independently examined this patient on 6/20/2021, as outlined below:  PHYSICAL EXAM:  General: WD, WN. Alert, cooperative, no acute distress    EENT:  EOMI. Anicteric sclerae. MMM  Resp:  CTA bilaterally, no wheezing or rales. No accessory muscle use  CV:  Regular  rhythm,  No edema  GI:  Soft, Non distended, Non tender. +Bowel sounds  Neurologic:  Alert and oriented X 3, normal speech,   Psych:   Good insight. Not anxious nor agitated  Skin:  No rashes. No jaundice    Reviewed most current lab test results and cultures  YES  Reviewed most current radiology test results   YES  Review and summation of old records today    NO  Reviewed patient's current orders and MAR    YES  PMH/SH reviewed - no change compared to H&P  ________________________________________________________________________  Care Plan discussed with:    Comments   Patient x    Family      RN     Care Manager     Consultant                        Multidiciplinary team rounds were held today with , nursing, pharmacist and clinical coordinator. Patient's plan of care was discussed; medications were reviewed and discharge planning was addressed.      ________________________________________________________________________  Total NON critical care TIME:  25    Minutes    Total CRITICAL CARE TIME Spent:   Minutes non procedure based      Comments   >50% of visit spent in counseling and coordination of care x     This includes time during multidisciplinary rounds if indicated above   ________________________________________________________________________  Wil Gonzalez MD Procedures: see electronic medical records for all procedures/Xrays and details which were not copied into this note but were reviewed prior to creation of Plan. LABS:  I reviewed today's most current labs and imaging studies. Pertinent labs include:  No results for input(s): WBC, HGB, HCT, PLT, HGBEXT, HCTEXT, PLTEXT, HGBEXT, HCTEXT, PLTEXT in the last 72 hours.   Recent Labs     06/18/21  0706   *   K 3.5   CL 97   CO2 33*   GLU 72   BUN 15   CREA 0.90   CA 9.0   MG 2.0

## 2021-06-20 NOTE — PROGRESS NOTES
Problem: Pressure Injury - Risk of  Goal: *Prevention of pressure injury  Description: Document Justin Scale and appropriate interventions in the flowsheet. Outcome: Progressing Towards Goal  Note: Pressure Injury Interventions:  Sensory Interventions: Assess changes in LOC, Assess need for specialty bed, Check visual cues for pain, Float heels, Keep linens dry and wrinkle-free, Maintain/enhance activity level, Minimize linen layers, Monitor skin under medical devices, Pressure redistribution bed/mattress (bed type), Turn and reposition approx. every two hours (pillows and wedges if needed)    Moisture Interventions: Absorbent underpads, Assess need for specialty bed, Check for incontinence Q2 hours and as needed, Contain wound drainage, Maintain skin hydration (lotion/cream), Minimize layers, Moisture barrier    Activity Interventions: Chair cushion, Increase time out of bed, Pressure redistribution bed/mattress(bed type)    Mobility Interventions: Float heels, HOB 30 degrees or less, PT/OT evaluation, Trapeze to reposition, Turn and reposition approx. every two hours(pillow and wedges)    Nutrition Interventions: Document food/fluid/supplement intake    Friction and Shear Interventions: HOB 30 degrees or less                Problem: Patient Education: Go to Patient Education Activity  Goal: Patient/Family Education  Outcome: Progressing Towards Goal     Problem: Falls - Risk of  Goal: *Absence of Falls  Description: Document Denae Fall Risk and appropriate interventions in the flowsheet.   Outcome: Progressing Towards Goal  Note: Fall Risk Interventions:  Mobility Interventions: Bed/chair exit alarm, OT consult for ADLs, Patient to call before getting OOB, PT Consult for mobility concerns, PT Consult for assist device competence, Strengthening exercises (ROM-active/passive), Utilize walker, cane, or other assistive device, Utilize gait belt for transfers/ambulation    Mentation Interventions: Bed/chair exit alarm, Door open when patient unattended, Eyeglasses and hearing aids, More frequent rounding, Reorient patient, Toileting rounds, Update white board    Medication Interventions: Bed/chair exit alarm, Evaluate medications/consider consulting pharmacy, Patient to call before getting OOB, Teach patient to arise slowly    Elimination Interventions: Bed/chair exit alarm, Call light in reach, Elevated toilet seat, Patient to call for help with toileting needs, Stay With Me (per policy), Toilet paper/wipes in reach, Toileting schedule/hourly rounds, Urinal in reach              Problem: Patient Education: Go to Patient Education Activity  Goal: Patient/Family Education  Outcome: Progressing Towards Goal     Problem: Pain  Goal: *Control of Pain  Outcome: Progressing Towards Goal     Problem: Patient Education: Go to Patient Education Activity  Goal: Patient/Family Education  Outcome: Progressing Towards Goal     Problem: Patient Education: Go to Patient Education Activity  Goal: Patient/Family Education  Outcome: Progressing Towards Goal

## 2021-06-21 ENCOUNTER — APPOINTMENT (OUTPATIENT)
Dept: GENERAL RADIOLOGY | Age: 86
DRG: 418 | End: 2021-06-21
Attending: NURSE PRACTITIONER
Payer: MEDICARE

## 2021-06-21 LAB
ALBUMIN SERPL-MCNC: 2.3 G/DL (ref 3.5–5)
ALBUMIN/GLOB SERPL: 0.5 {RATIO} (ref 1.1–2.2)
ALP SERPL-CCNC: 229 U/L (ref 45–117)
ALT SERPL-CCNC: 60 U/L (ref 12–78)
ANION GAP SERPL CALC-SCNC: 3 MMOL/L (ref 5–15)
AST SERPL-CCNC: 106 U/L (ref 15–37)
BILIRUB DIRECT SERPL-MCNC: 0.5 MG/DL (ref 0–0.2)
BILIRUB SERPL-MCNC: 1.7 MG/DL (ref 0.2–1)
BUN SERPL-MCNC: 12 MG/DL (ref 6–20)
BUN/CREAT SERPL: 16 (ref 12–20)
CALCIUM SERPL-MCNC: 8.9 MG/DL (ref 8.5–10.1)
CHLORIDE SERPL-SCNC: 97 MMOL/L (ref 97–108)
CO2 SERPL-SCNC: 34 MMOL/L (ref 21–32)
CREAT SERPL-MCNC: 0.73 MG/DL (ref 0.55–1.02)
GLOBULIN SER CALC-MCNC: 4.9 G/DL (ref 2–4)
GLUCOSE SERPL-MCNC: 69 MG/DL (ref 65–100)
INR PPP: 1.2 (ref 0.9–1.1)
POTASSIUM SERPL-SCNC: 3.1 MMOL/L (ref 3.5–5.1)
PROT SERPL-MCNC: 7.2 G/DL (ref 6.4–8.2)
PROTHROMBIN TIME: 12.5 SEC (ref 9–11.1)
SODIUM SERPL-SCNC: 134 MMOL/L (ref 136–145)

## 2021-06-21 PROCEDURE — 85610 PROTHROMBIN TIME: CPT

## 2021-06-21 PROCEDURE — 99222 1ST HOSP IP/OBS MODERATE 55: CPT | Performed by: SURGERY

## 2021-06-21 PROCEDURE — 74011250637 HC RX REV CODE- 250/637: Performed by: INTERNAL MEDICINE

## 2021-06-21 PROCEDURE — 97110 THERAPEUTIC EXERCISES: CPT

## 2021-06-21 PROCEDURE — 97535 SELF CARE MNGMENT TRAINING: CPT | Performed by: OCCUPATIONAL THERAPIST

## 2021-06-21 PROCEDURE — 97165 OT EVAL LOW COMPLEX 30 MIN: CPT | Performed by: OCCUPATIONAL THERAPIST

## 2021-06-21 PROCEDURE — 36415 COLL VENOUS BLD VENIPUNCTURE: CPT

## 2021-06-21 PROCEDURE — 80076 HEPATIC FUNCTION PANEL: CPT

## 2021-06-21 PROCEDURE — 74240 X-RAY XM UPR GI TRC 1CNTRST: CPT

## 2021-06-21 PROCEDURE — 80048 BASIC METABOLIC PNL TOTAL CA: CPT

## 2021-06-21 PROCEDURE — 97530 THERAPEUTIC ACTIVITIES: CPT

## 2021-06-21 PROCEDURE — 65270000029 HC RM PRIVATE

## 2021-06-21 RX ORDER — FLUMAZENIL 0.1 MG/ML
0.2 INJECTION INTRAVENOUS
Status: CANCELLED | OUTPATIENT
Start: 2021-06-21 | End: 2021-06-21

## 2021-06-21 RX ORDER — ATROPINE SULFATE 0.1 MG/ML
0.5 INJECTION INTRAVENOUS
Status: CANCELLED | OUTPATIENT
Start: 2021-06-21 | End: 2021-06-22

## 2021-06-21 RX ORDER — FAMOTIDINE 20 MG/1
20 TABLET, FILM COATED ORAL DAILY
Status: DISCONTINUED | OUTPATIENT
Start: 2021-06-22 | End: 2021-06-24 | Stop reason: HOSPADM

## 2021-06-21 RX ORDER — NALOXONE HYDROCHLORIDE 0.4 MG/ML
0.4 INJECTION, SOLUTION INTRAMUSCULAR; INTRAVENOUS; SUBCUTANEOUS
Status: CANCELLED | OUTPATIENT
Start: 2021-06-21 | End: 2021-06-21

## 2021-06-21 RX ORDER — SODIUM CHLORIDE 0.9 % (FLUSH) 0.9 %
5-40 SYRINGE (ML) INJECTION AS NEEDED
Status: CANCELLED | OUTPATIENT
Start: 2021-06-21

## 2021-06-21 RX ORDER — SODIUM CHLORIDE 0.9 % (FLUSH) 0.9 %
5-40 SYRINGE (ML) INJECTION EVERY 8 HOURS
Status: CANCELLED | OUTPATIENT
Start: 2021-06-21

## 2021-06-21 RX ORDER — POTASSIUM CHLORIDE 750 MG/1
40 TABLET, FILM COATED, EXTENDED RELEASE ORAL
Status: COMPLETED | OUTPATIENT
Start: 2021-06-21 | End: 2021-06-21

## 2021-06-21 RX ORDER — EPINEPHRINE 0.1 MG/ML
1 INJECTION INTRACARDIAC; INTRAVENOUS
Status: CANCELLED | OUTPATIENT
Start: 2021-06-21 | End: 2021-06-22

## 2021-06-21 RX ORDER — DEXTROMETHORPHAN/PSEUDOEPHED 2.5-7.5/.8
1.2 DROPS ORAL
Status: CANCELLED | OUTPATIENT
Start: 2021-06-21

## 2021-06-21 RX ORDER — LEVOFLOXACIN 5 MG/ML
500 INJECTION, SOLUTION INTRAVENOUS ONCE
Status: CANCELLED | OUTPATIENT
Start: 2021-06-21 | End: 2021-06-22

## 2021-06-21 RX ORDER — SODIUM CHLORIDE 9 MG/ML
75 INJECTION, SOLUTION INTRAVENOUS CONTINUOUS
Status: CANCELLED | OUTPATIENT
Start: 2021-06-21

## 2021-06-21 RX ADMIN — POTASSIUM CHLORIDE 40 MEQ: 750 TABLET, FILM COATED, EXTENDED RELEASE ORAL at 14:56

## 2021-06-21 RX ADMIN — ACETAMINOPHEN 1000 MG: 500 TABLET, FILM COATED ORAL at 06:17

## 2021-06-21 RX ADMIN — ACETAMINOPHEN 1000 MG: 500 TABLET, FILM COATED ORAL at 13:12

## 2021-06-21 RX ADMIN — ACETAMINOPHEN 1000 MG: 500 TABLET, FILM COATED ORAL at 21:47

## 2021-06-21 NOTE — PROGRESS NOTES
GI PROGRESS NOTE  Jamila Shankar, FIORDALIZA  [phone] NP in-hospital cell phone M-F until 4:30  After 5pm or on weekends, please call  for physician on call    NAME:Ciara Johnson :3/16/1930 WWU:[id number]   ATTG: Dr. Alexy Weller   PCP: mEmy Cuadra MD  Date/Time:  2021 1236 PM  Primary GI: Dr. Halie Dockery  Reason for following: Abnormal CT findings of GI tract     Assessment:     Choledocholithiais and cholelithiasis - chronic  Mild elevation in LFTs - new mild spike in bilirubin today  · CT abd/pel showed cholelithiasis, with dilated common bile duct 12 mm. Choledocholithiasis as well is irregular wall thickening of the distal common bile duct. Cannot exclude cholangiocarcinoma  · Transaminases back up a bit since  today - TB 1.7, ALT 60, ASt 106,    · MRI/MRCP:  Cholelithiasis with numerous common hepatic and common bile duct stones  · Was on full liquids last week and did well with this  · Denies abdominal pain, nausea or vomiting today but has hx of postprandial vomiting per chart     Atrial fibrillation  CAD  · On ASA  · Treatment per primary team      Plan:     · Awaiting Upper GI series  · Last week, our coleagues spoke to family about doing outpatient ERCP but with increase in TB, may need to consider doing inpatient as she is higher risk as well with advanced age. I attempted to call family today - Ruslan Stevenson and Della Anderson but they did not answer. · Follow LFT's  · Restart Clear liquid diet, will hold here until we decide if ERCP is to be done during inpatient stay. · Symptomatic care per primary team     Plan discussed with Dr Halie Dockery. Subjective:   Discussed with RN events overnight. Resting room. No complaints today.  Ghanshyam, RN denies sany current issues    Review of Systems:  Symptom Y/N Comments  Symptom Y/N Comments   Fever/Chills n   Chest Pain n    Cough n   Headaches n    Sputum n   Joint Pain n    SOB/YANES n   Pruritis/Rash n    Tolerating Diet y   Other       Could NOT obtain due to:      Objective:   VITALS:   Last 24hrs VS reviewed since prior progress note. Most recent are:  Visit Vitals  BP (!) 112/56   Pulse 73   Temp 97.5 °F (36.4 °C)   Resp 16   Ht 5' 4\" (1.626 m)   Wt 75.8 kg (167 lb)   SpO2 96%   BMI 28.67 kg/m²       Intake/Output Summary (Last 24 hours) at 6/21/2021 1236  Last data filed at 6/20/2021 1651  Gross per 24 hour   Intake 240 ml   Output    Net 240 ml     PHYSICAL EXAM:  General: No acute distress. Pleasant elderly AA female. HEENT: NC, Atraumatic. Anicteric sclerae. Lungs:  LS clear. No Wheezing/Rhonchi/Rales. Heart:  RRR,  No murmur, No Rubs  Abdomen: Soft, Non distended, Non tender. +Bowel sounds, no HSM  Extremities: No c/c/e  Neurologic:  Alert and confused. No acute neurological distress   Psych:   Little to no insight. Not anxious nor agitated. Lab and Radiology Data Reviewed: (see below)    Medications Reviewed: (see below)  PMH/SH reviewed - no change compared to H&P  ________________________________________________________________________  Total time spent with patient: 30 minutes ________________________________________________________________________  Care Plan discussed with:  Patient y   Family  Attempted to call family but unable to reach them, will try again later. HARRIET winslow              Consultant: Shoaib Barrera NP     Procedures: see electronic medical records for all procedures/Xrays and details which were not copied into this note but were reviewed prior to creation of Plan. LABS:  No results for input(s): WBC, HGB, HCT, PLT, HGBEXT, HCTEXT, PLTEXT, HGBEXT, HCTEXT, PLTEXT in the last 72 hours. Recent Labs     06/21/21 0633   *   K 3.1*   CL 97   CO2 34*   BUN 12   CREA 0.73   GLU 69   CA 8.9     Recent Labs     06/21/21 0633   *   TP 7.2   ALB 2.3*   GLOB 4.9*     Recent Labs     06/21/21 0633   INR 1.2*   PTP 12.5*      No results for input(s): FE, TIBC, PSAT, FERR in the last 72 hours.    No results found for: FOL, RBCF  No results for input(s): PH, PCO2, PO2 in the last 72 hours. No results for input(s): CPK, CKMB in the last 72 hours.     No lab exists for component: TROPONINI  Lab Results   Component Value Date/Time    Color YELLOW/STRAW 06/15/2021 11:18 PM    Appearance CLEAR 06/15/2021 11:18 PM    Specific gravity 1.005 06/15/2021 11:18 PM    Specific gravity 1.022 05/06/2019 07:58 PM    pH (UA) 6.0 06/15/2021 11:18 PM    Protein Negative 06/15/2021 11:18 PM    Glucose Negative 06/15/2021 11:18 PM    Ketone Negative 06/15/2021 11:18 PM    Bilirubin Negative 06/15/2021 11:18 PM    Urobilinogen 1.0 06/15/2021 11:18 PM    Nitrites Negative 06/15/2021 11:18 PM    Leukocyte Esterase SMALL (A) 06/15/2021 11:18 PM    Epithelial cells FEW 06/15/2021 11:18 PM    Bacteria 1+ (A) 06/15/2021 11:18 PM    WBC 5-10 06/15/2021 11:18 PM    RBC 0-5 06/15/2021 11:18 PM       MEDICATIONS:  Current Facility-Administered Medications   Medication Dose Route Frequency    metoprolol succinate (TOPROL-XL) XL tablet 25 mg  25 mg Oral DAILY    famotidine (PF) (PEPCID) 20 mg in 0.9% sodium chloride 10 mL injection  20 mg IntraVENous QHS    acetaminophen (TYLENOL) tablet 1,000 mg  1,000 mg Oral Q8H    ziprasidone (GEODON) 10 mg in sterile water (preservative free) 0.5 mL injection  10 mg IntraMUSCular Q8H PRN    ondansetron (ZOFRAN) injection 4 mg  4 mg IntraVENous Q1H PRN    sodium chloride (NS) flush 5-40 mL  5-40 mL IntraVENous Q8H    sodium chloride (NS) flush 5-40 mL  5-40 mL IntraVENous PRN    acetaminophen (TYLENOL) tablet 650 mg  650 mg Oral Q6H PRN    Or    acetaminophen (TYLENOL) suppository 650 mg  650 mg Rectal Q6H PRN    polyethylene glycol (MIRALAX) packet 17 g  17 g Oral DAILY PRN    ondansetron (ZOFRAN ODT) tablet 4 mg  4 mg Oral Q8H PRN    Or    ondansetron (ZOFRAN) injection 4 mg  4 mg IntraVENous Q6H PRN    0.9% sodium chloride infusion  50 mL/hr IntraVENous CONTINUOUS

## 2021-06-21 NOTE — PROGRESS NOTES
End of Shift Note    Bedside shift change report given to Cecile RN (oncoming nurse) by Nenita Valencia RN (offgoing nurse). Report included the following information SBAR, Kardex, ED Summary, Intake/Output, MAR and Recent Results    Shift worked:  7a-7p     Shift summary and any significant changes:    Patient rested in bed today. Worked with PT/OT. Episode of some hypotension. No complaints of pain. Family at bedside during afternoon. Tolerating clears well. No BM. Consent signed and in chart. Concerns for physician to address:  n/a     [facility] phone for oncoming shift:   [phone]       Activity:  Activity Level: Bed Rest  Number times ambulated in hallways past shift: 0  Number of times OOB to chair past shift: 0    Cardiac:   Cardiac Monitoring: Not applicable      Cardiac Rhythm: Atrial Fib    Access:   Current line(s): no access     Genitourinary:   Urinary status: incontinent    Respiratory:   O2 Device: None (Room air)  Chronic home O2 use?: N/A  Incentive spirometer at bedside: N/A     GI:     Current diet:  ADULT ORAL NUTRITION SUPPLEMENT Breakfast, Lunch, Dinner; Clear Liquid  DIET ONE TIME MESSAGE  ADULT DIET Clear Liquid  Passing flatus: YES  Tolerating current diet: YES       Pain Management:   Patient states pain is manageable on current regimen: N/A    Skin:  Justin Score: 17  Interventions: turn team, float heels, increase time out of bed, PT/OT consult and limit briefs    Patient Safety:  Fall Score:  Total Score: 4  Interventions: bed/chair alarm, assistive device (walker, cane, etc), gripper socks, pt to call before getting OOB, stay with me (per policy) and gait belt  High Fall Risk: Yes    Length of Stay:  Expected LOS: 3d 0h  Actual LOS: [facility], , RN

## 2021-06-21 NOTE — PROGRESS NOTES
Hospitalist Progress Note    NAME: Chioma Tee   :  3/16/1930   MRN:  [id number]     Interim Hospital Summary: 80 y.o. female whom presented on 6/15/2021 with      Assessment / Plan:    Cholelithiasis   Choledocholithiasis   LFT elevation  Nausea vomiting  Anorexia and weight loss  -CT abdomen  1. Cholelithiasis, with dilated common bile duct 12 mm. Choledocholithiasis as  well is irregular wall thickening of the distal common bile duct. Cannot exclude  cholangiocarcinoma. 2.  Uterine fibroids. -MRCP: Cholelithiasis with numerous common hepatic and common bile duct stones. -UGIS pending  -on CLD. -GI input noted. Recommending to proceed with ERCP as inpatient as her LFTs rising and she is already off coumadin. INR 1.2. Discussed with daughter  -PT OT eval and treat. Atrial fibrillation, probably chronic  CAD  -Holding aspirin   -Lisinopril and Lasix held due to soft blood pressure.   -continue metoprolol   -continue holding coumadin in anticipation of ERCP    Dementia  -mood stable    25.0 - 29.9 Overweight / Body mass index is 28.67 kg/m². Estimated discharge date:   Barriers:    Code status: Full  Prophylaxis: SCD's  Recommended Disposition:  PT, OT, RN       Subjective:     Chief Complaint / Reason for Physician Visit  Follow up of gallstones, anorexia, dilated common bile duct, A. fib  Chart reviewed in detail. Discussed with RN events overnight. Review of Systems:  Symptom Y/N Comments  Symptom Y/N Comments   Fever/Chills    Chest Pain     Poor Appetite    Edema     Cough    Abdominal Pain     Sputum    Joint Pain     SOB/YANES    Pruritis/Rash     Nausea/vomit    Tolerating PT/OT     Diarrhea    Tolerating Diet     Constipation    Other       Could NOT obtain due to:      PO intake: No data found. Objective:     VITALS:   Last 24hrs VS reviewed since prior progress note.  Most recent are:  Patient Vitals for the past 24 hrs:   Temp Pulse Resp BP SpO2   06/21/21 1150 97.5 °F (36.4 °C) 73 16 (!) 112/56 96 %   06/21/21 0917    (!) 103/53    06/21/21 0819 97.9 °F (36.6 °C) 70 16 (!) 84/42 95 %   06/21/21 0745    (!) 94/47    06/21/21 0623 97.6 °F (36.4 °C) 70 16 126/64 95 %   06/20/21 2016 98 °F (36.7 °C) 81 16 (!) 107/56 98 %   06/20/21 1522 98.3 °F (36.8 °C) 69 16 109/64 95 %       Intake/Output Summary (Last 24 hours) at 6/21/2021 1440  Last data filed at 6/21/2021 1150  Gross per 24 hour   Intake 240 ml   Output    Net 240 ml        I had a face to face encounter, and independently examined this patient on 6/21/2021, as outlined below:  PHYSICAL EXAM:  General: WD, WN. Alert, cooperative, no acute distress    EENT:  EOMI. Anicteric sclerae. MMM  Resp:  CTA bilaterally, no wheezing or rales. No accessory muscle use  CV:  Regular  rhythm,  No edema  GI:  Soft, Non distended, Non tender. +Bowel sounds  Neurologic:  Alert and oriented X 3, normal speech,   Psych:   Good insight. Not anxious nor agitated  Skin:  No rashes. No jaundice    Reviewed most current lab test results and cultures  YES  Reviewed most current radiology test results   YES  Review and summation of old records today    NO  Reviewed patient's current orders and MAR    YES  PMH/SH reviewed - no change compared to H&P  ________________________________________________________________________  Care Plan discussed with:    Comments   Patient x    Family  x    RN     Care Manager     Consultant                        Multidiciplinary team rounds were held today with , nursing, pharmacist and clinical coordinator. Patient's plan of care was discussed; medications were reviewed and discharge planning was addressed.      ________________________________________________________________________  Total NON critical care TIME:  25    Minutes    Total CRITICAL CARE TIME Spent:   Minutes non procedure based      Comments   >50% of visit spent in counseling and coordination of care x     This includes time during multidisciplinary rounds if indicated above   ________________________________________________________________________  Roger Shin MD     Procedures: see electronic medical records for all procedures/Xrays and details which were not copied into this note but were reviewed prior to creation of Plan. LABS:  I reviewed today's most current labs and imaging studies. Pertinent labs include:  No results for input(s): WBC, HGB, HCT, PLT, HGBEXT, HCTEXT, PLTEXT, HGBEXT, HCTEXT, PLTEXT in the last 72 hours.   Recent Labs     06/21/21  0633   *   K 3.1*   CL 97   CO2 34*   GLU 69   BUN 12   CREA 0.73   CA 8.9   ALB 2.3*   TBILI 1.7*   ALT 60   INR 1.2*

## 2021-06-21 NOTE — PROGRESS NOTES
End of Shift Note    Bedside shift change report given to Tata Thurston (oncoming nurse) by Elysia Carvalho (offgoing nurse). Report included the following information SBAR, Kardex and MAR    Shift worked:  7p-7a     Shift summary and any significant changes: The first scheduled Tylenol dose was held, patient was given Tylenol at the start of my shift for headache, see PRN MAR. Patient does not have any IV access,  Pepcid was held due to this issue. MD is aware. Patient tried getting up from the bed twice, she was subsequently reoriented to get back in bed with assistance. Patient teaching and routine rounding has been done. Concerns for physician to address:       [employer] phone for oncoming shift:          Activity:  Activity Level: Bed Rest  Number times ambulated in hallways past shift: 0  Number of times OOB to chair past shift: 0    Cardiac:   Cardiac Monitoring: No      Cardiac Rhythm: Atrial Fib    Access:   Current line(s): no access     Genitourinary:   Urinary status: incontinent    Respiratory:   O2 Device: None (Room air)  Chronic home O2 use?: NO  Incentive spirometer at bedside: NO     GI:     Current diet:  DIET NPO  ADULT ORAL NUTRITION SUPPLEMENT Breakfast, Lunch, Dinner; Clear Liquid  DIET ONE TIME MESSAGE  Passing flatus: YES  Tolerating current diet: YES       Pain Management:   Patient states pain is manageable on current regimen: YES    Skin:  Justin Score: 17  Interventions: turn team    Patient Safety:  Fall Score:  Total Score: 4  Interventions: bed/chair alarm and pt to call before getting OOB  High Fall Risk: Yes    Length of Stay:  Expected LOS: 3d 0h  Actual LOS: 6      Elysia Carvalho

## 2021-06-21 NOTE — PROGRESS NOTES
Problem: Mobility Impaired (Adult and Pediatric)  Goal: *Acute Goals and Plan of Care (Insert Text)  Description: FUNCTIONAL STATUS PRIOR TO ADMISSION: The patient is a poor historian. Patient was modified independent using a walker for functional mobility. HOME SUPPORT PRIOR TO ADMISSION: The patient lived with daughter who assists her. Physical Therapy Goals  Initiated 6/18/2021  1. Patient will move from supine to sit and sit to supine  in bed with modified independence within 7 day(s). 2.  Patient will transfer from bed to chair and chair to bed with minimal assistance using the least restrictive device within 7 day(s). 3.  Patient will perform sit to stand with modified independence within 7 day(s). 4.  Patient will ambulate with minimal assistance for 50 feet with the least restrictive device within 7 day(s). Outcome: Progressing Towards Goal   PHYSICAL THERAPY TREATMENT  Patient: Billy Holder (89 y.o. female)  Date: 6/21/2021  Diagnosis: Nausea & vomiting [R11.2]  Anorexia [R63.0]  Cholelithiases [K80.20]  NADIA (acute kidney injury) ([facility] [address] [N17.9]  Hypokalemia [E87.6] <principal problem not specified>       Precautions: Fall  Chart, physical therapy assessment, plan of care and goals were reviewed. ASSESSMENT  Patient continues with skilled PT services and is progressing towards goals. Patient in bed upon arrival, agreeable to therapy session. Patient completes supine<>sit with supervision, cuing for sequencing. Upon sitting, pt c/o dizziness with BP initially 98/56 mmHg and HR 72 bpm and after a few minutes 116/65 mmHg with continuing to c/o dizziness. Multiple sit<>stand Min Ax2, patient initially coming to standing with significant forward flexed posture requiring cuing for upright stance and bringing hips anteriorly. Patient c/o dizziness and suddenly sitting back on bed. BP appears to remain stable though remains soft.  Second standing attempt, patient able to maintain standing balance while performing pericare though with high risk for falling due to significant forward flexion. Instructed patient in seated exercises EOB with cuing for proper performance. Scooting up in bed with SBA and cuing for sequencing. Patient returned to bed due to lower BP and pt c/o dizziness, all needs in reach. Current Level of Function Impacting Discharge (mobility/balance): SBA bed mobility; Min Ax2 transfers; unable to ambulate    Other factors to consider for discharge: Mod Indep PLOF, baseline dementia         PLAN :  Patient continues to benefit from skilled intervention to address the above impairments. Continue treatment per established plan of care. to address goals. Recommendation for discharge: (in order for the patient to meet his/her long term goals)  Therapy up to 5 days/week in SNF setting or intensive home health therapy program    This discharge recommendation:  Has been made in collaboration with the attending provider and/or case management    IF patient discharges home will need the following DME: to be determined (TBD)       SUBJECTIVE:   Patient stated I'm so dizzy.     OBJECTIVE DATA SUMMARY:   Critical Behavior:  Neurologic State: Confused  Orientation Level: Oriented to person  Cognition: Follows commands     Functional Mobility Training:  Bed Mobility:  Rolling: Supervision  Supine to Sit: Supervision;Bed Modified  Sit to Supine: Supervision  Scooting: Stand-by assistance  Transfers:  Sit to Stand: Minimum assistance; Additional time;Assist x2 (assist X1 to 2)  Stand to Sit: Minimum assistance  Balance:  Sitting: Intact  Sitting - Static: Good (unsupported)  Sitting - Dynamic: Good (unsupported)  Standing: Impaired; With support  Standing - Static: Constant support; Fair  Standing - Dynamic : Constant support; Fair  Ambulation/Gait Training:    Unable, patient c/o dizziness in standing with limited tolerance for standing.  Upon standing with RW, patient demonstrates significant forward flexion with  tendency to bear weight through forearms requiring cuing for upright posture    Therapeutic Exercises:       EXERCISE   Sets   Reps   Active Active Assist   Passive Self ROM   Comments   Ankle Pumps 1 10 [x] [] [] []    Long Arc Quads 1 10 [x] [] [] []    Marching 1 10 [x] [] [] []       [] [] [] []        Pain Ratin/10    Activity Tolerance:   Fair, SpO2 stable on RA, requires rest breaks and signs and symptoms of orthostatic hypotension    After treatment patient left in no apparent distress:   Supine in bed, Call bell within reach, Bed / chair alarm activated and Side rails x 3    COMMUNICATION/COLLABORATION:   The patients plan of care was discussed with: Occupational therapist and Registered nurse.      Elizabeth Richard, PRANAV   Time Calculation: 28 mins

## 2021-06-21 NOTE — CONSULTS
Surgery Consult  Consulted by: Dr. Maria M Padgett  PCP: Osorio Ansari MD    Thank you for allowing me to participate in your patient's care. Please call me with any questions. Assessment:   1. Cholecystolithiasis and choledocholithiasis with tbil 1.7.    2. \"Abnormal peristalsis of the esophagus with hiatal hernia. Significant significantly deformed distal antrum with narrowing. \"    Body mass index is 28.67 kg/m². Comorbid h/o CAD/MI, h/o stroke, HTN, dementia. S/p no prior abdominal ops. Plan:   Patient states she does not want to have surgery but daughter who is POA in setting of patient with dementia wants to pursue surgery. She will undergo upper endoscopy/ERCP tomorrow. Will not plan to do cholecystectomy at same time. Given size of stones, may require CBD exploration and would prefer to do that robotically. Will await results of endoscopy. Discussed the risk of surgery including bleeding, infection, injury to adjacent structures, and the risks of general anesthetic. The patient understands the risks; any and all questions were answered to the patient's satisfaction. Subjective:      Dian Tesfaye is a 80 y.o. female who is seen in consultation at the request of Dr. Maria M Padgett for cholecystolithiasis with choledocholithiasis in the setting of prolonged anorexia. Family says the patient has barely eaten for the last month and this is now progressed to nausea and vomiting after she eats or drinks. The patient's daughter, who is her POA, tells me that she has lost weight as well. She has not had any problem with fever or chills. Imaging has revealed gallstones and choledocholithiasis. Her bilirubin was 1.7 today. She has a history of stroke and dementia. She also has reportedly had an MI in the past.  She is typically on Coumadin. This is being held presently.     Dr. Mraia M Padgett has seen her and is planning for upper endoscopy with ERCP and attempted removal of common bile duct stones. Objective:   Blood pressure 137/75, pulse 79, temperature 97.1 °F (36.2 °C), resp. rate 16, height 5' 4\" (1.626 m), weight 75.8 kg (167 lb), SpO2 95 %. Temp (24hrs), Av.6 °F (36.4 °C), Min:97.1 °F (36.2 °C), Max:98 °F (36.7 °C)      Physical Exam:  PHYSICAL EXAM:  Gen:  [x]     A&O     [x]      No acute distress    [x]     non-toxic     []     ill apearing     []     Critical        HEENT:   [x]     anicteric    []      scleral icterus    [x]     moist mucosa     []     dry mucosa    RESP:   [x]     CTA bilaterally, no wheezing/rhonchi/rales/crackles    []     wheezing     []     rhonchi     []     crackles     []     use of accessory muscles    CARD:  [x]     regular rate and rhythm     [x]     No murmurs/rubs/gallops    []     irregular rhythm     []     Murmur     []     Rubs     []     Gallops    ABD:     [x]  soft  [x]     non distended  [x]     non tender  [x]      NABS    No scars or hernias. SKIN:   [x]     normal      []Rashes      []Ulcers     EXT:  [x]      No CCE     []2+ pulses throughout    []      Clubbing     []Cyanosis     []Edema     []diminished pulses    NEUR:  [x]     Strength normal     []weakness   []LUE     []RUE     []LLE     []RLE    [x]     follows commands          PSYCH:   insight []Poor   [x]good                      []     depressed     []     anxious     []     agitated    Past Medical History:   Diagnosis Date    Acid reflux     Arthritis     CAD (coronary artery disease)     MI, no cardiologist at this time    Cancer [facility])     breast cancer    [facility] [address] [facility]     Hypertension     Stroke ([facility] [address]     approx 15 years prior per daughter ()     Past Surgical History:   Procedure Laterality Date    HX GYN      x6 vaginal births    [place] BREAST SURGERY PROCEDURE [place]ED      right breast removal due to cancer    [place] CARDIAC SURG PROCEDURE UNLIST      x1 stent placed      History reviewed. No pertinent family history.   Social History     Socioeconomic History    Marital status:      Spouse name: Not on file    Number of children: Not on file    Years of education: Not on file    Highest education level: Not on file   Tobacco Use    Smoking status: Never Smoker    Smokeless tobacco: Never Used   Vaping Use    Vaping Use: Never used   Substance and Sexual Activity    Alcohol use: No    Drug use: Never    Sexual activity: Never     Social Determinants of Health     Financial Resource Strain:     Difficulty of Paying Living Expenses:    Food Insecurity:     Worried About Running Out of Food in the Last Year:     [address] in the Last Year:    Transportation Needs:     Lack of Transportation (Medical):  Lack of Transportation (Non-Medical):    Physical Activity:     Days of Exercise per Week:     Minutes of Exercise per Session:    Stress:     Feeling of Stress :    Social Connections:     Frequency of Communication with Friends and Family:     Frequency of Social Gatherings with Friends and Family:     Attends Mandaen Services:     Active Member of Clubs or Organizations:     Attends Club or Organization Meetings:     Marital Status:       Prior to Admission medications    Medication Sig Start Date End Date Taking? Authorizing Provider   traMADol (ULTRAM) 50 mg tablet Take 0.5 Tabs by mouth every eight (8) hours as needed for Pain. Max Daily Amount: 75 mg. 4/26/16   Callum Lambert MD   metoprolol succinate (TOPROL-XL) 25 mg XL tablet Take 1 Tab by mouth daily. 4/4/15   Luz Elena Marshall MD   furosemide (LASIX) 20 mg tablet Take one tablet daily 4/4/15   Joaquín Hubbard Caro, MD   omeprazole (PRILOSEC) 40 mg capsule Take 1 Cap by mouth daily. 4/4/15   Luz Elena Marshall MD   nitroglycerin (NITROSTAT) 0.4 mg SL tablet 0.4 mg by SubLINGual route every five (5) minutes as needed for Chest Pain. Karishma Mcgill MD   warfarin (COUMADIN) 5 mg tablet Take 5 mg by mouth daily.     Karishma Mcgill MD   atorvastatin (LIPITOR) 20 mg tablet Take 20 mg by mouth daily. Karishma Mcgill MD   lisinopril (PRINIVIL, ZESTRIL) 5 mg tablet Take 5 mg by mouth daily. Karishma Mcgill MD   aspirin 81 mg chewable tablet Take 81 mg by mouth daily. Karishma Mcgill MD     ALLERGIES:  No Known Allergies    Review of Systems:  (unchecked were asked but negative)  Constitutional: [] Fever/chills   [] Sweats   [x] Loss of appetite   [] Fatigue/weakness   [x] Weight loss  Head & Neck:   [] Headaches   [] Visual loss   [] Hearing loss   [] Thyroid problems  Cardiovascular:   [x] Stroke   [] Calf pain when walking   [] Chest pain   [] Rapid heart beat       [x] Heart attack   [] Stents   [] Congestive heart failure   [] Leg swelling   [] Murmur  Respiratory:   [] Sleep apnea   [] Cough/congestion   [] Shortness of breath   [] Wheezing/asthma      [] COPD/emphysema  Gastrointestinal:   [x] Frequent indigestion   [] Trouble swallowing   [] Nausea   [] Vomiting   [] Bloating   [] Abd pain       [] Diarrhea   [] Constipation   [] Blood in stool   [] Ulcers   [] Intestinal disease   [] Hepatitis    Genitourinary:   [] Painful urination   [] Difficulty urinating   [] Frequent urination       [] Enlarged prostate   [] Vasectomy   [] Blood in urine   [] Dialysis  Musculoskeletal:  [] Muscle aches   [] Back pain   [] Joint pain  Neurologic:    [] Seizures   [] Dizziness   [] Numbness  Hematologic:   [] Nosebleeds   [] Easy bruising   [] Anemia   [] Easy bleeding  Psychiatric:    [] Depression   [] Anxiety   [] Bipolar disorder   [] Schizophrenia                                  []     Unable to obtain  ROS due to  []     mental status change  []     sedated   []     intubated    LABS:  No results for input(s): WBC, HGB, HCT, PLT, HGBEXT, HCTEXT, PLTEXT in the last 72 hours.   Recent Labs     06/21/21 0633   *   K 3.1*   CL 97   CO2 34*   BUN 12   CREA 0.73   GLU 69   CA 8.9     Recent Labs     06/21/21 0633   *   TP 7.2   ALB 2.3*   GLOB 4.9*     Recent Labs     06/21/21 0633   INR 1.2*   PTP 12.5*         Signed By: Bryce Harp MD     June 21, 2021

## 2021-06-21 NOTE — PROGRESS NOTES
Problem: Self Care Deficits Care Plan (Adult)  Goal: *Acute Goals and Plan of Care (Insert Text)  Description:   FUNCTIONAL STATUS PRIOR TO ADMISSION: Pt lives with her daughter. Pt is unable to report her PLOF due to history of dementia. Per medical record daughter assisted pt prn. HOME SUPPORT: The patient lived with her daughter. Occupational Therapy Goals  Initiated 6/21/2021  1. Patient will perform grooming in standing with minimal assistance within 7 day(s). 2.  Patient will perform bathing with moderate assistance  within 7 day(s). 3.  Patient will perform upper body dressing with minimal assistance/contact guard assist within 7 day(s). 4.  Patient will perform toilet transfers with moderate assistance  within 7 day(s). 5.  Patient will perform all aspects of toileting with minimal assistance within 7 day(s). 6.  Patient will participate in upper extremity therapeutic exercise/activities with supervision/set-up for 5 minutes within 7 day(s). Outcome: Not Met   OCCUPATIONAL THERAPY EVALUATION  Patient: Samantha Morley (81 y.o. female)  Date: 6/21/2021  Primary Diagnosis: Nausea & vomiting [R11.2]  Anorexia [R63.0]  Cholelithiases [K80.20]  NADIA (acute kidney injury) ([facility] [address] [N17.9]  Hypokalemia [E87.6]  Procedure(s) (LRB):  ENDOSCOPIC RETROGRADE CHOLANGIOPANCREATOGRAPHY (ERCP) (N/A)     Precautions:   Fall    ASSESSMENT  Based on the objective data described below, the patient presents with diet restriction, hx of dementia and is a poor historian, decreased balance, generalized weakness, dizziness reported  in sitting and standing (BP on the low side, but stable) impairing adls and functional mobility. Pt was able to follow commands, requiring assist X2 for mobility this date. PLOF is not known at this time, but pt this is hospital day 6 (pt is 80) and is likely functioning functioning below her baseline. Will follow for acute OT services and to determined PLOF.   At discharge, pt may need rehab  depending on family's wishes. .    Current Level of Function Impacting Discharge (ADLs/self-care): up to mod  A for adls, assist X2 for functional mobility    Functional Outcome Measure: The patient scored Total: 30/100 on the Barthel Index outcome measure which is indicative of 70% impaired ability to care for basic self needs/dependency on others; inferred dependency on others for instrumental ADLs (baseline). Other factors to consider for discharge: pt's medical status, hx of dementia     Patient will benefit from skilled therapy intervention to address the above noted impairments. PLAN :  Recommendations and Planned Interventions: self care training, functional mobility training, therapeutic exercise, balance training, therapeutic activities, endurance activities, patient education, and home safety training    Frequency/Duration: Patient will be followed by occupational therapy 3 times a week to address goals. Recommendation for discharge: (in order for the patient to meet his/her long term goals)  Therapy up to 5 days/week in SNF setting vs. Home with HH therapies and 24 hour assistance ; depending on family's wishes    This discharge recommendation:  Has not yet been discussed the attending provider and/or case management    IF patient discharges home will need the following DME: tbd       SUBJECTIVE:   Patient stated **thank you so much.     OBJECTIVE DATA SUMMARY:   HISTORY:   Past Medical History:   Diagnosis Date    Acid reflux     Arthritis     CAD (coronary artery disease)     MI, no cardiologist at this time    Cancer [facility])     breast cancer    Dementia ([facility] [address]     Hypertension     Stroke ([facility] [address]     approx 15 years prior per daughter (2000)     Past Surgical History:   Procedure Laterality Date    HX GYN      x6 vaginal births    SD BREAST SURGERY PROCEDURE UNLISTED      right breast removal due to cancer    [place] CARDIAC SURG PROCEDURE UNLIST      x1 stent placed Expanded or extensive additional review of patient history:     Home Situation  Home Environment: Private residence  # Steps to Enter: 0  One/Two Story Residence: One story  Living Alone:  (lives w daughter--pt unable to report PLOF)  Support Systems: Family member(s)  Patient Expects to be Discharged to[de-identified] [employer]  Current DME Used/Available at Home: Commode, bedside, Cane, straight, Walker, rolling  Tub or Shower Type: Tub/Shower combination    Hand dominance: Right    EXAMINATION OF PERFORMANCE DEFICITS:  Cognitive/Behavioral Status:  Neurologic State:  (baseline dementia)  Orientation Level: Oriented to person  Cognition: Follows commands             Skin: generally intact    Edema: none observed    Hearing: Auditory  Auditory Impairment: Hard of hearing, bilateral    Vision/Perceptual:                                Corrective Lenses: Glasses    Range of Motion:  BUEs:   AROM: Generally decreased, functional  PROM: Generally decreased, functional                      Strength:  BUEs:  Strength: Generally decreased, functional                Coordination:  Coordination: Within functional limits  Fine Motor Skills-Upper: Left Intact; Right Intact    Gross Motor Skills-Upper: Left Intact; Right Intact    Tone & Sensation:  Sensation: not tested, pt declines numbness/tingling  Tone: Normal                         Balance:  Sitting: Intact  Sitting - Static: Good (unsupported)  Sitting - Dynamic: Good (unsupported)  Standing: Impaired; With support  Standing - Static: Constant support; Fair  Standing - Dynamic : Constant support; Fair    Functional Mobility and Transfers for ADLs:  Bed Mobility:  Rolling: Supervision  Supine to Sit: Supervision;Bed Modified  Sit to Supine: Supervision  Scooting: Stand-by assistance    Transfers:  Sit to Stand: Minimum assistance; Additional time;Assist x2 (dhara X1 to 2)  Stand to Sit: Minimum assistance  Assistive Device : Walker    ADL Assessment:  Feeding: Stand-by assistance (modified diet)    Oral Facial Hygiene/Grooming: Stand-by assistance;Setup    Bathing: Moderate assistance    Upper Body Dressing: Minimum assistance    Lower Body Dressing: Minimum assistance    Toileting: Supervision;Setup;Contact guard assistance; mod A due to need for thoroughness                ADL Intervention and task modifications:                      Upper Body [address]: Moderate assistance    Lower Body Dressing Assistance  Socks: set up--bending forward without difficulty to feet seated EOB  Position Performed: Seated EOB              Functional Measure:  Barthel Index:    Bathin  Bladder: 5  Bowels: 5  Groomin  Dressin  Feedin  Mobility: 0  Stairs: 0  Toilet Use: 0  Transfer (Bed to Chair and Back): 5  Total: 30/100        The Barthel ADL Index: Guidelines  1. The index should be used as a record of what a patient does, not as a record of what a patient could do. 2. The main aim is to establish degree of independence from any help, physical or verbal, however minor and for whatever reason. 3. The need for supervision renders the patient not independent. 4. A patient's performance should be established using the best available evidence. Asking the patient, friends/relatives and nurses are the usual sources, but direct observation and common sense are also important. However direct testing is not needed. 5. Usually the patient's performance over the preceding 24-48 hours is important, but occasionally longer periods will be relevant. 6. Middle categories imply that the patient supplies over 50 per cent of the effort. 7. Use of aids to be independent is allowed. Patricio Galindo., Barthel, D.W. (2952). Functional evaluation: the Barthel Index. [facility] (14)2. JOHN PAUL De La Rosa, Betty Viera., Olivia Moe., Robert, [address] ().  Measuring the change indisability after inpatient rehabilitation; comparison of the responsiveness of the Barthel Index and Functional [place] Measure. Journal of Neurology, Neurosurgery, and Psychiatry, 66(4), 952-998. MARIBELL Solano, CEE Fuller, & Rosita Jacobo M.A. (2004.) Assessment of post-stroke quality of life in cost-effectiveness studies: The usefulness of the Barthel Index and the EuroQoL-5D. Quality of Life Research, 15, 435-52         Occupational Therapy Evaluation Charge Determination   History Examination Decision-Making   LOW Complexity : Brief history review  MEDIUM Complexity : 3-5 performance deficits relating to physical, cognitive , or psychosocial skils that result in activity limitations and / or participation restrictions MEDIUM Complexity : Patient may present with comorbidities that affect occupational performnce. Miniml to moderate modification of tasks or assistance (eg, physical or verbal ) with assesment(s) is necessary to enable patient to complete evaluation       Based on the above components, the patient evaluation is determined to be of the following complexity level: LOW   Pain Rating:  No complaints of pain    Activity Tolerance:   BP was on the low side, but stable. Pt c/o dizziness in sitting and in standing positions. Worked on exercises  during mobility    After treatment patient left in no apparent distress:    Supine in bed, Call bell within reach, and nurse aware, bed alarm set    COMMUNICATION/EDUCATION:   The patients plan of care was discussed with: Physical therapist and Registered nurse. Patient is unable to participate in goal setting and plan of care. This patients plan of care is appropriate for delegation to [facility].     Thank you for this referral.  Keyon Bateman OTR/L  Time Calculation: 31 mins

## 2021-06-21 NOTE — PROGRESS NOTES
Transition of Care Plan:     RUR: 12%  Disposition: TBD- SNF placement  Follow up appointments: PCP & specialists as needed  DME needed: TBD  Transportation at Discharge: Family  [place] or means to access home:  No  IM Medicare letter: Needed at d/c  Caregiver Contact: Dtr/ [id number] Karuna Rodriguez ([phone])  2nd- Khadar Quiñones ([phone])  Discharge Caregiver contacted prior to discharge? CM reviewed pt's chart. CM noted PT/OT recommendation for SNF placement. D/t pt's hx of dementia CM attempted to contact pt's dtr & son but received no answer. CM left a VM requesting a call back. CM will attempt to contact pt's family again in the AM. Will continue to follow & report updates as they become available.     Octavia Severino MSW  Care Management

## 2021-06-21 NOTE — PROGRESS NOTES
[facility] PROGRESS NOTE:    Spoke to [place], [place] daughter about plan of care. We discussed risks and benefits of doing ERCP and then need for cholecystectomy. Daughter is agreeable to plan of care and planning for procedure tomorrow.      - Will plan for ERCP tomorrow  - Ernestina LARIOS to assist with signing consent  - NPO after midnight, clears today  - Check PT/INR in AM.     Thank you,   Ruben Torres, NP

## 2021-06-22 ENCOUNTER — ANESTHESIA (OUTPATIENT)
Dept: SURGERY | Age: 86
DRG: 418 | End: 2021-06-22
Payer: MEDICARE

## 2021-06-22 ENCOUNTER — ANESTHESIA EVENT (OUTPATIENT)
Dept: SURGERY | Age: 86
DRG: 418 | End: 2021-06-22
Payer: MEDICARE

## 2021-06-22 ENCOUNTER — APPOINTMENT (OUTPATIENT)
Dept: GENERAL RADIOLOGY | Age: 86
DRG: 418 | End: 2021-06-22
Attending: INTERNAL MEDICINE
Payer: MEDICARE

## 2021-06-22 LAB
ALBUMIN SERPL-MCNC: 2.3 G/DL (ref 3.5–5)
ALBUMIN/GLOB SERPL: 0.5 {RATIO} (ref 1.1–2.2)
ALP SERPL-CCNC: 370 U/L (ref 45–117)
ALT SERPL-CCNC: 66 U/L (ref 12–78)
ANION GAP SERPL CALC-SCNC: 5 MMOL/L (ref 5–15)
AST SERPL-CCNC: 132 U/L (ref 15–37)
BILIRUB SERPL-MCNC: 1.3 MG/DL (ref 0.2–1)
BUN SERPL-MCNC: 11 MG/DL (ref 6–20)
BUN/CREAT SERPL: 15 (ref 12–20)
CALCIUM SERPL-MCNC: 9 MG/DL (ref 8.5–10.1)
CHLORIDE SERPL-SCNC: 98 MMOL/L (ref 97–108)
CO2 SERPL-SCNC: 31 MMOL/L (ref 21–32)
CREAT SERPL-MCNC: 0.74 MG/DL (ref 0.55–1.02)
GLOBULIN SER CALC-MCNC: 4.8 G/DL (ref 2–4)
GLUCOSE SERPL-MCNC: 66 MG/DL (ref 65–100)
INR PPP: 1.2 (ref 0.9–1.1)
LIPASE SERPL-CCNC: 76 U/L (ref 73–393)
MAGNESIUM SERPL-MCNC: 1.7 MG/DL (ref 1.6–2.4)
POTASSIUM SERPL-SCNC: 3.1 MMOL/L (ref 3.5–5.1)
PROT SERPL-MCNC: 7.1 G/DL (ref 6.4–8.2)
PROTHROMBIN TIME: 12.5 SEC (ref 9–11.1)
SODIUM SERPL-SCNC: 134 MMOL/L (ref 136–145)

## 2021-06-22 PROCEDURE — 74011250637 HC RX REV CODE- 250/637: Performed by: INTERNAL MEDICINE

## 2021-06-22 PROCEDURE — 74011250636 HC RX REV CODE- 250/636: Performed by: ANESTHESIOLOGY

## 2021-06-22 PROCEDURE — 74011250637 HC RX REV CODE- 250/637: Performed by: NURSE PRACTITIONER

## 2021-06-22 PROCEDURE — 83690 ASSAY OF LIPASE: CPT

## 2021-06-22 PROCEDURE — 74330 X-RAY BILE/PANC ENDOSCOPY: CPT

## 2021-06-22 PROCEDURE — 74011250636 HC RX REV CODE- 250/636: Performed by: NURSE ANESTHETIST, CERTIFIED REGISTERED

## 2021-06-22 PROCEDURE — 77030010104 HC SEAL PRT ENDOSC BYRN -B: Performed by: INTERNAL MEDICINE

## 2021-06-22 PROCEDURE — 88342 IMHCHEM/IMCYTCHM 1ST ANTB: CPT

## 2021-06-22 PROCEDURE — 77030041276 HC SPHNTOM BILI BSC -F: Performed by: INTERNAL MEDICINE

## 2021-06-22 PROCEDURE — 74011250637 HC RX REV CODE- 250/637: Performed by: HOSPITALIST

## 2021-06-22 PROCEDURE — 77030020268 HC MISC GENERAL SUPPLY: Performed by: INTERNAL MEDICINE

## 2021-06-22 PROCEDURE — 74011000636 HC RX REV CODE- 636: Performed by: INTERNAL MEDICINE

## 2021-06-22 PROCEDURE — 76010000149 HC OR TIME 1 TO 1.5 HR: Performed by: INTERNAL MEDICINE

## 2021-06-22 PROCEDURE — 77030009038 HC CATH BILI STN RTVR BSC -C: Performed by: INTERNAL MEDICINE

## 2021-06-22 PROCEDURE — 74011250636 HC RX REV CODE- 250/636: Performed by: INTERNAL MEDICINE

## 2021-06-22 PROCEDURE — 74011250636 HC RX REV CODE- 250/636: Performed by: HOSPITALIST

## 2021-06-22 PROCEDURE — 94760 N-INVAS EAR/PLS OXIMETRY 1: CPT

## 2021-06-22 PROCEDURE — 88305 TISSUE EXAM BY PATHOLOGIST: CPT

## 2021-06-22 PROCEDURE — 76060000033 HC ANESTHESIA 1 TO 1.5 HR: Performed by: INTERNAL MEDICINE

## 2021-06-22 PROCEDURE — 99232 SBSQ HOSP IP/OBS MODERATE 35: CPT | Performed by: SURGERY

## 2021-06-22 PROCEDURE — 0DJ08ZZ INSPECTION OF UPPER INTESTINAL TRACT, VIA NATURAL OR ARTIFICIAL OPENING ENDOSCOPIC: ICD-10-PCS | Performed by: INTERNAL MEDICINE

## 2021-06-22 PROCEDURE — 36415 COLL VENOUS BLD VENIPUNCTURE: CPT

## 2021-06-22 PROCEDURE — 77010033678 HC OXYGEN DAILY

## 2021-06-22 PROCEDURE — 77030013079 HC BLNKT BAIR HGGR 3M -A: Performed by: NURSE ANESTHETIST, CERTIFIED REGISTERED

## 2021-06-22 PROCEDURE — 83735 ASSAY OF MAGNESIUM: CPT

## 2021-06-22 PROCEDURE — 0FC98ZZ EXTIRPATION OF MATTER FROM COMMON BILE DUCT, VIA NATURAL OR ARTIFICIAL OPENING ENDOSCOPIC: ICD-10-PCS | Performed by: INTERNAL MEDICINE

## 2021-06-22 PROCEDURE — C1874 STENT, COATED/COV W/DEL SYS: HCPCS | Performed by: INTERNAL MEDICINE

## 2021-06-22 PROCEDURE — 77030008684 HC TU ET CUF COVD -B: Performed by: NURSE ANESTHETIST, CERTIFIED REGISTERED

## 2021-06-22 PROCEDURE — 0F798ZZ DILATION OF COMMON BILE DUCT, VIA NATURAL OR ARTIFICIAL OPENING ENDOSCOPIC: ICD-10-PCS | Performed by: INTERNAL MEDICINE

## 2021-06-22 PROCEDURE — 2709999900 HC NON-CHARGEABLE SUPPLY: Performed by: INTERNAL MEDICINE

## 2021-06-22 PROCEDURE — 65270000029 HC RM PRIVATE

## 2021-06-22 PROCEDURE — 76210000006 HC OR PH I REC 0.5 TO 1 HR: Performed by: INTERNAL MEDICINE

## 2021-06-22 PROCEDURE — 80053 COMPREHEN METABOLIC PANEL: CPT

## 2021-06-22 PROCEDURE — 77030026438 HC STYL ET INTUB CARD -A: Performed by: NURSE ANESTHETIST, CERTIFIED REGISTERED

## 2021-06-22 PROCEDURE — C1726 CATH, BAL DIL, NON-VASCULAR: HCPCS | Performed by: INTERNAL MEDICINE

## 2021-06-22 PROCEDURE — 85610 PROTHROMBIN TIME: CPT

## 2021-06-22 PROCEDURE — 77030040361 HC SLV COMPR DVT MDII -B: Performed by: INTERNAL MEDICINE

## 2021-06-22 PROCEDURE — 77030019988 HC FCPS ENDOSC DISP BSC -B: Performed by: INTERNAL MEDICINE

## 2021-06-22 PROCEDURE — 74011000250 HC RX REV CODE- 250: Performed by: NURSE ANESTHETIST, CERTIFIED REGISTERED

## 2021-06-22 PROCEDURE — 77030018712 HC DEV BLLN INFL BSC -B: Performed by: INTERNAL MEDICINE

## 2021-06-22 DEVICE — STENT SYSTEM RMV
Type: IMPLANTABLE DEVICE | Site: BILE DUCT | Status: FUNCTIONAL
Brand: WALLFLEX BILIARY

## 2021-06-22 RX ORDER — FENTANYL CITRATE 50 UG/ML
INJECTION, SOLUTION INTRAMUSCULAR; INTRAVENOUS AS NEEDED
Status: DISCONTINUED | OUTPATIENT
Start: 2021-06-22 | End: 2021-06-22 | Stop reason: HOSPADM

## 2021-06-22 RX ORDER — POTASSIUM CHLORIDE 7.45 MG/ML
10 INJECTION INTRAVENOUS
Status: COMPLETED | OUTPATIENT
Start: 2021-06-22 | End: 2021-06-22

## 2021-06-22 RX ORDER — HYDROMORPHONE HYDROCHLORIDE 1 MG/ML
.2-.5 INJECTION, SOLUTION INTRAMUSCULAR; INTRAVENOUS; SUBCUTANEOUS
Status: DISCONTINUED | OUTPATIENT
Start: 2021-06-22 | End: 2021-06-22 | Stop reason: HOSPADM

## 2021-06-22 RX ORDER — SODIUM CHLORIDE, SODIUM LACTATE, POTASSIUM CHLORIDE, CALCIUM CHLORIDE 600; 310; 30; 20 MG/100ML; MG/100ML; MG/100ML; MG/100ML
25 INJECTION, SOLUTION INTRAVENOUS CONTINUOUS
Status: DISCONTINUED | OUTPATIENT
Start: 2021-06-22 | End: 2021-06-22 | Stop reason: HOSPADM

## 2021-06-22 RX ORDER — PHENYLEPHRINE HCL IN 0.9% NACL 0.4MG/10ML
SYRINGE (ML) INTRAVENOUS AS NEEDED
Status: DISCONTINUED | OUTPATIENT
Start: 2021-06-22 | End: 2021-06-22 | Stop reason: HOSPADM

## 2021-06-22 RX ORDER — ONDANSETRON 2 MG/ML
INJECTION INTRAMUSCULAR; INTRAVENOUS AS NEEDED
Status: DISCONTINUED | OUTPATIENT
Start: 2021-06-22 | End: 2021-06-22 | Stop reason: HOSPADM

## 2021-06-22 RX ORDER — SUCCINYLCHOLINE CHLORIDE 20 MG/ML
INJECTION INTRAMUSCULAR; INTRAVENOUS AS NEEDED
Status: DISCONTINUED | OUTPATIENT
Start: 2021-06-22 | End: 2021-06-22 | Stop reason: HOSPADM

## 2021-06-22 RX ORDER — ROCURONIUM BROMIDE 10 MG/ML
INJECTION, SOLUTION INTRAVENOUS AS NEEDED
Status: DISCONTINUED | OUTPATIENT
Start: 2021-06-22 | End: 2021-06-22 | Stop reason: HOSPADM

## 2021-06-22 RX ORDER — ONDANSETRON 2 MG/ML
4 INJECTION INTRAMUSCULAR; INTRAVENOUS AS NEEDED
Status: DISCONTINUED | OUTPATIENT
Start: 2021-06-22 | End: 2021-06-22 | Stop reason: HOSPADM

## 2021-06-22 RX ORDER — SODIUM CHLORIDE 0.9 % (FLUSH) 0.9 %
5-40 SYRINGE (ML) INJECTION AS NEEDED
Status: DISCONTINUED | OUTPATIENT
Start: 2021-06-22 | End: 2021-06-22 | Stop reason: HOSPADM

## 2021-06-22 RX ORDER — PROPOFOL 10 MG/ML
INJECTION, EMULSION INTRAVENOUS AS NEEDED
Status: DISCONTINUED | OUTPATIENT
Start: 2021-06-22 | End: 2021-06-22 | Stop reason: HOSPADM

## 2021-06-22 RX ORDER — DEXAMETHASONE SODIUM PHOSPHATE 4 MG/ML
INJECTION, SOLUTION INTRA-ARTICULAR; INTRALESIONAL; INTRAMUSCULAR; INTRAVENOUS; SOFT TISSUE AS NEEDED
Status: DISCONTINUED | OUTPATIENT
Start: 2021-06-22 | End: 2021-06-22 | Stop reason: HOSPADM

## 2021-06-22 RX ORDER — FENTANYL CITRATE 50 UG/ML
25 INJECTION, SOLUTION INTRAMUSCULAR; INTRAVENOUS
Status: DISCONTINUED | OUTPATIENT
Start: 2021-06-22 | End: 2021-06-22 | Stop reason: HOSPADM

## 2021-06-22 RX ORDER — LIDOCAINE HYDROCHLORIDE 10 MG/ML
0.1 INJECTION, SOLUTION EPIDURAL; INFILTRATION; INTRACAUDAL; PERINEURAL AS NEEDED
Status: DISCONTINUED | OUTPATIENT
Start: 2021-06-22 | End: 2021-06-22 | Stop reason: HOSPADM

## 2021-06-22 RX ORDER — LIDOCAINE HYDROCHLORIDE 20 MG/ML
INJECTION, SOLUTION EPIDURAL; INFILTRATION; INTRACAUDAL; PERINEURAL AS NEEDED
Status: DISCONTINUED | OUTPATIENT
Start: 2021-06-22 | End: 2021-06-22 | Stop reason: HOSPADM

## 2021-06-22 RX ORDER — SODIUM CHLORIDE 0.9 % (FLUSH) 0.9 %
5-40 SYRINGE (ML) INJECTION EVERY 8 HOURS
Status: DISCONTINUED | OUTPATIENT
Start: 2021-06-22 | End: 2021-06-22 | Stop reason: HOSPADM

## 2021-06-22 RX ORDER — MORPHINE SULFATE 2 MG/ML
2 INJECTION, SOLUTION INTRAMUSCULAR; INTRAVENOUS
Status: DISCONTINUED | OUTPATIENT
Start: 2021-06-22 | End: 2021-06-22 | Stop reason: HOSPADM

## 2021-06-22 RX ORDER — EPHEDRINE SULFATE/0.9% NACL/PF 50 MG/5 ML
SYRINGE (ML) INTRAVENOUS AS NEEDED
Status: DISCONTINUED | OUTPATIENT
Start: 2021-06-22 | End: 2021-06-22 | Stop reason: HOSPADM

## 2021-06-22 RX ORDER — DIPHENHYDRAMINE HYDROCHLORIDE 50 MG/ML
12.5 INJECTION, SOLUTION INTRAMUSCULAR; INTRAVENOUS AS NEEDED
Status: DISCONTINUED | OUTPATIENT
Start: 2021-06-22 | End: 2021-06-22 | Stop reason: HOSPADM

## 2021-06-22 RX ORDER — LEVOFLOXACIN 5 MG/ML
INJECTION, SOLUTION INTRAVENOUS AS NEEDED
Status: DISCONTINUED | OUTPATIENT
Start: 2021-06-22 | End: 2021-06-22 | Stop reason: HOSPADM

## 2021-06-22 RX ADMIN — FENTANYL CITRATE 25 MCG: 50 INJECTION, SOLUTION INTRAMUSCULAR; INTRAVENOUS at 11:45

## 2021-06-22 RX ADMIN — Medication 10 ML: at 18:36

## 2021-06-22 RX ADMIN — Medication 20 MG: at 12:14

## 2021-06-22 RX ADMIN — ACETAMINOPHEN 1000 MG: 500 TABLET, FILM COATED ORAL at 14:52

## 2021-06-22 RX ADMIN — FAMOTIDINE 20 MG: 20 TABLET ORAL at 10:20

## 2021-06-22 RX ADMIN — PROPOFOL 30 MG: 10 INJECTION, EMULSION INTRAVENOUS at 12:10

## 2021-06-22 RX ADMIN — LIDOCAINE HYDROCHLORIDE 60 MG: 20 INJECTION, SOLUTION EPIDURAL; INFILTRATION; INTRACAUDAL; PERINEURAL at 11:45

## 2021-06-22 RX ADMIN — ACETAMINOPHEN 650 MG: 325 TABLET ORAL at 18:37

## 2021-06-22 RX ADMIN — SUCCINYLCHOLINE CHLORIDE 120 MG: 20 INJECTION, SOLUTION INTRAMUSCULAR; INTRAVENOUS at 11:45

## 2021-06-22 RX ADMIN — DEXAMETHASONE SODIUM PHOSPHATE 8 MG: 4 INJECTION, SOLUTION INTRAMUSCULAR; INTRAVENOUS at 11:45

## 2021-06-22 RX ADMIN — METOPROLOL SUCCINATE 25 MG: 25 TABLET, EXTENDED RELEASE ORAL at 10:20

## 2021-06-22 RX ADMIN — Medication 5 ML: at 22:16

## 2021-06-22 RX ADMIN — LEVOFLOXACIN 500 MG: 5 INJECTION, SOLUTION INTRAVENOUS at 11:50

## 2021-06-22 RX ADMIN — POTASSIUM CHLORIDE 10 MEQ: 10 INJECTION, SOLUTION INTRAVENOUS at 11:16

## 2021-06-22 RX ADMIN — SODIUM CHLORIDE, POTASSIUM CHLORIDE, SODIUM LACTATE AND CALCIUM CHLORIDE 25 ML/HR: 600; 310; 30; 20 INJECTION, SOLUTION INTRAVENOUS at 11:18

## 2021-06-22 RX ADMIN — POTASSIUM CHLORIDE 10 MEQ: 10 INJECTION, SOLUTION INTRAVENOUS at 13:29

## 2021-06-22 RX ADMIN — PROPOFOL 100 MG: 10 INJECTION, EMULSION INTRAVENOUS at 11:45

## 2021-06-22 RX ADMIN — POTASSIUM CHLORIDE 10 MEQ: 10 INJECTION, SOLUTION INTRAVENOUS at 10:21

## 2021-06-22 RX ADMIN — ACETAMINOPHEN 1000 MG: 500 TABLET, FILM COATED ORAL at 22:16

## 2021-06-22 RX ADMIN — ROCURONIUM BROMIDE 5 MG: 10 INJECTION INTRAVENOUS at 11:45

## 2021-06-22 RX ADMIN — Medication 3 AMPULE: at 11:18

## 2021-06-22 RX ADMIN — GLUCAGON HYDROCHLORIDE 40 MCG: KIT at 12:13

## 2021-06-22 RX ADMIN — Medication 80 MCG: at 11:49

## 2021-06-22 RX ADMIN — ONDANSETRON HYDROCHLORIDE 4 MG: 2 INJECTION, SOLUTION INTRAMUSCULAR; INTRAVENOUS at 12:30

## 2021-06-22 RX ADMIN — SODIUM CHLORIDE 50 ML/HR: 9 INJECTION, SOLUTION INTRAVENOUS at 22:33

## 2021-06-22 NOTE — PROGRESS NOTES
Surgical Progress Note  21      Patient seen and examined by me today. Attending provider:  Palma Samuel MD   PCP:  Antoinette Luu MD         Assessment     Plan  Gallstones. Successful ERCP removal of CBD stones and placement of temporary stent by Dr. Mirna Driver today. Discussed surgery with her. At  First she said she did not want any operation. After I explained that it would be laparoscopic she thought it sounded like a good idea. Will try to reach her daughter/POA to discuss. Have tentatively put her on schedule for tomorrow afternoon. Subjective:     Chief Complaint: none    Review of Systems:   yes/no  yes/no   Fever n Abdominal pain n   Chills n Flatus ? Chest pain n Nausea n   Cough n Vomiting n   Sputum n Constipation n   SOB n Diarrhea n   Dysuria n Tolerating diet  na      []       Unable to obtain  ROS due to:_____________________    Objective:     VITALS:   Last 24hrs VS reviewed since prior hospitalist progress note. Most recent are:  Visit Vitals  /70   Pulse 97   Temp 96.8 °F (36 °C)   Resp 19   Ht 5' 4\" (1.626 m)   Wt 75.8 kg (167 lb)   SpO2 100%   BMI 28.67 kg/m²     Temp (24hrs), Av °F (36.7 °C), Min:96.8 °F (36 °C), Max:98.7 °F (37.1 °C)      Intake/Output Summary (Last 24 hours) at 2021 1658  Last data filed at 2021 1248  Gross per 24 hour   Intake 1260 ml   Output    Net 1260 ml        PHYSICAL EXAM:  General appearance  Alert, cooperative, no distress, appears stated age. Eyes  Anicteric. Neck Supple. Lungs   Clear to auscultation bilaterally. Heart  Regular rate and rhythm. No murmur, rub or gallop. Abdomen   Soft. Bowel sounds normal.  NT.      Extremities No edema               Neurologic Without overt sensory or motor deficit        Lab Data Reviewed: (see below)    Medications Reviewed: (see below)    PMH/SH reviewed - no change compared to H&P  ________________________________________________________________________  LABS:  No results for input(s): WBC, HGB, HCT, PLT, HGBEXT, HCTEXT, PLTEXT in the last 72 hours. Recent Labs     06/22/21 0358 06/21/21 0633   * 134*   K 3.1* 3.1*   CL 98 97   CO2 31 34*   BUN 11 12   CREA 0.74 0.73   GLU 66 69   CA 9.0 8.9   MG 1.7  --      Recent Labs     06/22/21 0358 06/21/21 0633   ALT 66 60   * 229*   TBILI 1.3* 1.7*   TP 7.1 7.2   ALB 2.3* 2.3*   GLOB 4.8* 4.9*   LPSE 76  --      Recent Labs     06/22/21 0358 06/21/21 0633   INR 1.2* 1.2*   PTP 12.5* 12.5*      No results for input(s): FE, TIBC, PSAT, FERR in the last 72 hours. No results for input(s): PH, PCO2, PO2 in the last 72 hours. No results for input(s): CPK, CKMB in the last 72 hours.     No lab exists for component: TROPONINI  No results found for: GLUCPOC    MEDICATIONS:  Current Facility-Administered Medications   Medication Dose Route Frequency    famotidine (PEPCID) tablet 20 mg  20 mg Oral DAILY    metoprolol succinate (TOPROL-XL) XL tablet 25 mg  25 mg Oral DAILY    acetaminophen (TYLENOL) tablet 1,000 mg  1,000 mg Oral Q8H    ziprasidone (GEODON) 10 mg in sterile water (preservative free) 0.5 mL injection  10 mg IntraMUSCular Q8H PRN    ondansetron (ZOFRAN) injection 4 mg  4 mg IntraVENous Q1H PRN    sodium chloride (NS) flush 5-40 mL  5-40 mL IntraVENous Q8H    sodium chloride (NS) flush 5-40 mL  5-40 mL IntraVENous PRN    acetaminophen (TYLENOL) tablet 650 mg  650 mg Oral Q6H PRN    Or    acetaminophen (TYLENOL) suppository 650 mg  650 mg Rectal Q6H PRN    polyethylene glycol (MIRALAX) packet 17 g  17 g Oral DAILY PRN    ondansetron (ZOFRAN ODT) tablet 4 mg  4 mg Oral Q8H PRN    Or    ondansetron (ZOFRAN) injection 4 mg  4 mg IntraVENous Q6H PRN    0.9% sodium chloride infusion  50 mL/hr IntraVENous CONTINUOUS

## 2021-06-22 NOTE — PERIOP NOTES
10:05= spoke with Chel Bose, Primary RN; stated to start KCL10 meq and send other two bags with pt to Pre Op; also stated to administer PO meds with sip of H2O.

## 2021-06-22 NOTE — PROGRESS NOTES
Hospitalist Progress Note    NAME: Alex Degroot   :  3/16/1930   MRN:  [id number]     Interim Hospital Summary: 80 y.o. female whom presented on 6/15/2021 with      Assessment / Plan:    Cholelithiasis   Choledocholithiasis   LFT elevation  Nausea vomiting  Anorexia and weight loss  -CT abdomen  1. Cholelithiasis, with dilated common bile duct 12 mm. Choledocholithiasis as  well is irregular wall thickening of the distal common bile duct. Cannot exclude  cholangiocarcinoma. 2.  Uterine fibroids. -MRCP: Cholelithiasis with numerous common hepatic and common bile duct stones. -UGIS pending  -on CLD. -GI input noted. -Plan for ERCP today  -General surgery on board    Atrial fibrillation, probably chronic  CAD  -Holding aspirin   -Lisinopril and Lasix held due to soft blood pressure.   -continue metoprolol   -continue holding coumadin in anticipation of ERCP    Dementia  -mood stable    25.0 - 29.9 Overweight / Body mass index is 28.67 kg/m². Estimated discharge date:   Barriers:    Code status: Full  Prophylaxis: SCD's  Recommended Disposition:  PT, OT, RN       Subjective:     Chief Complaint / Reason for Physician Visit  Follow up of gallstones, anorexia, dilated common bile duct, A. fib  Chart reviewed in detail. Discussed with RN events overnight. Denies any abdominal pain today      Review of Systems:  Symptom Y/N Comments  Symptom Y/N Comments   Fever/Chills    Chest Pain     Poor Appetite    Edema     Cough    Abdominal Pain     Sputum    Joint Pain     SOB/YANES    Pruritis/Rash     Nausea/vomit    Tolerating PT/OT     Diarrhea    Tolerating Diet     Constipation    Other       Could NOT obtain due to:      PO intake: No data found. Objective:     VITALS:   Last 24hrs VS reviewed since prior progress note.  Most recent are:  Patient Vitals for the past 24 hrs:   Temp Pulse Resp BP SpO2   21 1040 97.8 °F (36.6 °C) 78 20 134/83 96 %   06/22/21 0733 97.8 °F (36.6 °C) 99 16 107/69 95 %   06/22/21 0428 97.9 °F (36.6 °C) 80 16 126/79 99 %   06/22/21 0035 98.7 °F (37.1 °C) 88 16 133/80 99 %   06/21/21 2050 98.1 °F (36.7 °C) (!) 101 16 136/80 95 %   06/21/21 1532 97.1 °F (36.2 °C) 79 16 137/75 95 %   06/21/21 1150 97.5 °F (36.4 °C) 73 16 (!) 112/56 96 %       Intake/Output Summary (Last 24 hours) at 6/22/2021 1142  Last data filed at 6/21/2021 2152  Gross per 24 hour   Intake 360 ml   Output    Net 360 ml        I had a face to face encounter, and independently examined this patient on 6/22/2021, as outlined below:  PHYSICAL EXAM:  General: WD, WN. Alert, cooperative, no acute distress    EENT:  EOMI. Anicteric sclerae. MMM  Resp:  CTA bilaterally, no wheezing or rales. No accessory muscle use  CV:  Regular  rhythm,  No edema  GI:  Soft, Non distended, Non tender. +Bowel sounds  Neurologic:  Alert and oriented X 3, normal speech,   Psych:   Good insight. Not anxious nor agitated  Skin:  No rashes. No jaundice    Reviewed most current lab test results and cultures  YES  Reviewed most current radiology test results   YES  Review and summation of old records today    NO  Reviewed patient's current orders and MAR    YES  PMH/ reviewed - no change compared to H&P  ________________________________________________________________________  Care Plan discussed with:    Comments   Patient x    Family  x    RN     Care Manager     Consultant                        Multidiciplinary team rounds were held today with , nursing, pharmacist and clinical coordinator. Patient's plan of care was discussed; medications were reviewed and discharge planning was addressed.      ________________________________________________________________________  Total NON critical care TIME:  25    Minutes    Total CRITICAL CARE TIME Spent:   Minutes non procedure based      Comments   >50% of visit spent in counseling and coordination of care x     This includes time during multidisciplinary rounds if indicated above   ________________________________________________________________________  Adry Arredondo MD     Procedures: see electronic medical records for all procedures/Xrays and details which were not copied into this note but were reviewed prior to creation of Plan. LABS:  I reviewed today's most current labs and imaging studies. Pertinent labs include:  No results for input(s): WBC, HGB, HCT, PLT, HGBEXT, HCTEXT, PLTEXT, HGBEXT, HCTEXT, PLTEXT in the last 72 hours.   Recent Labs     06/22/21  0358 06/21/21  0633   * 134*   K 3.1* 3.1*   CL 98 97   CO2 31 34*   GLU 66 69   BUN 11 12   CREA 0.74 0.73   CA 9.0 8.9   MG 1.7  --    ALB 2.3* 2.3*   TBILI 1.3* 1.7*   ALT 66 60   INR 1.2* 1.2*

## 2021-06-22 NOTE — PROGRESS NOTES
Transition of Care Plan:     RUR: 12%  Disposition: TBD- Home w/ [facility]  Follow up appointments: PCP & specialists as needed  DME needed: TBD  Transportation at [place] [address] or means to access home:  No  IM Medicare letter: Needed at d/c  Caregiver Contact: Dtr/ MPOA- AnnetteAnjel dan ([phone])  2nd- Liliane Arrington ([phone])  Discharge Caregiver contacted prior to discharge?     CM reviewed chart. Pt planned to undergo endoscopy/ ERCP this AM. CM contacted pt's dtr Evelin Holbrook ([phone]) to discuss recommendations for SNF placement. Pt's dtr NOT agreeable to SNF & reported she prefers for her mother to return home. Pt's dtr & her  stated they are able to care for pt's needs in the home & believe the pt would \"no want to be in a facility. \" Pt's dtr agreeable to [facility]. CM provided FOC, pt's family did not identify a preference as they stated \"all of them are the same. \" CM actively working to secure [facility] prior to d/c. Will report updates as they become available. Will continue to follow.     MJ Benavidez  Care Management

## 2021-06-22 NOTE — PROGRESS NOTES
GI Note    I tried calling patient's daughter/POA to discuss ERCP  but she did not answer    Signed By: Mana Broussard MD     June 22, 2021

## 2021-06-22 NOTE — PROGRESS NOTES
Problem: Pressure Injury - Risk of  Goal: *Prevention of pressure injury  Description: Document Justin Scale and appropriate interventions in the flowsheet. Outcome: Progressing Towards Goal  Note: Pressure Injury Interventions:  Sensory Interventions: Assess changes in LOC, Check visual cues for pain    Moisture Interventions: Absorbent underpads, Check for incontinence Q2 hours and as needed    Activity Interventions: PT/OT evaluation    Mobility Interventions: Float heels, PT/OT evaluation    Nutrition Interventions: Document food/fluid/supplement intake, Offer support with meals,snacks and hydration    Friction and Shear Interventions: HOB 30 degrees or less, Lift sheet, Minimize layers                Problem: Patient Education: Go to Patient Education Activity  Goal: Patient/Family Education  Outcome: Progressing Towards Goal     Problem: Falls - Risk of  Goal: *Absence of Falls  Description: Document Denae Fall Risk and appropriate interventions in the flowsheet.   Outcome: Progressing Towards Goal  Note: Fall Risk Interventions:  Mobility Interventions: Bed/chair exit alarm, Communicate number of staff needed for ambulation/transfer    Mentation Interventions: Bed/chair exit alarm, Reorient patient, More frequent rounding, Toileting rounds    Medication Interventions: Bed/chair exit alarm    Elimination Interventions: Bed/chair exit alarm, Toileting schedule/hourly rounds, Call light in reach              Problem: Patient Education: Go to Patient Education Activity  Goal: Patient/Family Education  Outcome: Progressing Towards Goal     Problem: Pain  Goal: *Control of Pain  Outcome: Progressing Towards Goal     Problem: Patient Education: Go to Patient Education Activity  Goal: Patient/Family Education  Outcome: Progressing Towards Goal     Problem: Patient Education: Go to Patient Education Activity  Goal: Patient/Family Education  Outcome: Progressing Towards Goal     Problem: Patient Education: Go to Patient Education Activity  Goal: Patient/Family Education  Outcome: Progressing Towards Goal

## 2021-06-22 NOTE — PERIOP NOTES
1404 Post anesthesia sign out pending, ok to transfer patient to floor per Dr. Benedict Parent. [phone] attempted to update patient's daughter Jarek Chol) [phone], [phone] no answer at either listed phone numbers.

## 2021-06-22 NOTE — PERIOP NOTES
Patients consent signed by patients son in law. Attempted to call patients children Wilber Nelson, and Jaden Cooper. Left messages for all three. Notified Dr. Milagro Warren. Dr. Milagro Warren stated he spoke with the patients daughter yesterday and she consented to the procedure.

## 2021-06-22 NOTE — INTERDISCIPLINARY ROUNDS
Interdisciplinary Rounds were completed on 06/22/21 for this patient. Rounds included nursing, clinical care leader, pharmacy, and case management. Plan of care discussed. See clinical pathway and/or care plan for interventions and desired outcomes.

## 2021-06-22 NOTE — PROGRESS NOTES
End of Shift Note    0739: Preoperative report given to Asif Culver RN. Brenden Cisneros LPN informed to notify Asif Culver ext. [phone] when IV has been placed by PICC RN. Bedside shift change report given to Brenden Cisneros LPN (oncoming nurse) by Tristan Arvizu RN (offgoing nurse). Report included the following information SBAR, Kardex, Intake/Output, MAR and Recent Results    Shift worked:  7p-7a     Shift summary and any significant changes:    Patient A/o x2 this shift, pleasantly confused but cooperative. Voiding with no difficulties. Has been NPO since midnight. Unsuccessful attempts at IV insertion. Message left for PICC RN. Concerns for physician to address:  N/A     Zone phone for oncoming shift:   [phone]       Activity:  Activity Level: Bed Rest  Number times ambulated in hallways past shift: 0  Number of times OOB to chair past shift: 0    Cardiac:   Cardiac Monitoring: No      Cardiac Rhythm: Atrial Fib    Access:   Current line(s): no access     Genitourinary:   Urinary status: voiding and incontinent    Respiratory:   O2 Device: None (Room air)  Chronic home O2 use?: NO  Incentive spirometer at bedside: N/A     GI:     Current diet:  ADULT ORAL NUTRITION SUPPLEMENT Breakfast, Lunch, Dinner; Clear Liquid  DIET ONE TIME MESSAGE  DIET NPO  Passing flatus: YES  Tolerating current diet: YES       Pain Management:   Patient states pain is manageable on current regimen: N/A    Skin:  Justin Score: 15  Interventions: turn team, float heels, increase time out of bed, PT/OT consult and limit briefs    Patient Safety:  Fall Score:  Total Score: 4  Interventions: bed/chair alarm, gripper socks, pt to call before getting OOB and stay with me (per policy)  High Fall Risk: Yes    Length of Stay:  Expected LOS: 3d 0h  Actual LOS: Obdulia Garcia RN

## 2021-06-22 NOTE — PERIOP NOTES
TRANSFER - OUT REPORT:    Verbal report given to [phone] ) on Phineas Crews  being transferred to 2180(unit) for routine post - op       Report consisted of patients Situation, Background, Assessment and   Recommendations(SBAR). Information from the following report(s) SBAR, OR Summary, Procedure Summary, Intake/Output, MAR and Cardiac Rhythm AFIB was reviewed with the receiving nurse. Opportunity for questions and clarification was provided.       Patient transported with:   [employer]

## 2021-06-22 NOTE — ANESTHESIA POSTPROCEDURE EVALUATION
Procedure(s):  ENDOSCOPIC RETROGRADE CHOLANGIOPANCREATOGRAPHY (ERCP) WITH BIOPSY, BALLOON DILATION, STONE REMOVAL, SPHINCTEROTOMY, AND STENT PLACEMENT. general    Anesthesia Post Evaluation        Patient location during evaluation: PACU  Note status: Adequate. Level of consciousness: responsive to verbal stimuli and sleepy but conscious  Pain management: satisfactory to patient  Airway patency: patent  Anesthetic complications: no  Cardiovascular status: acceptable  Respiratory status: acceptable  Hydration status: acceptable  Comments: +Post-Anesthesia Evaluation and Assessment    Patient: Nereida Thompson MRN: [id number]  SSN: [id number]   YOB: 1930  Age: 80 y.o. Sex: female      Cardiovascular Function/Vital Signs    BP (!) 118/57   Pulse 79   Temp 36.7 °C (98 °F)   Resp 23   Ht 5' 4\" (1.626 m)   Wt 75.8 kg (167 lb)   SpO2 94%   BMI 28.67 kg/m²     Patient is status post Procedure(s):  ENDOSCOPIC RETROGRADE CHOLANGIOPANCREATOGRAPHY (ERCP) WITH BIOPSY, BALLOON DILATION, STONE REMOVAL, SPHINCTEROTOMY, AND STENT PLACEMENT. Nausea/Vomiting: Controlled. Postoperative hydration reviewed and adequate. Pain:  Pain Scale 1: Numeric (0 - 10) (06/22/21 1353)  Pain Intensity 1: 0 (06/22/21 1353)   Managed. Neurological Status:   Neuro (WDL): Exceptions to WDL (06/22/21 1045)   At baseline. Mental Status and Level of Consciousness: Arousable. Pulmonary Status:   O2 Device: None (Room air) (06/22/21 1344)   Adequate oxygenation and airway patent. Complications related to anesthesia: None    Post-anesthesia assessment completed. No concerns.     Signed By: Radha Paulson MD    6/22/2021  Post anesthesia nausea and vomiting:  controlled      INITIAL Post-op Vital signs:   Vitals Value Taken Time   /57 06/22/21 1345   Temp 36.7 °C (98 °F) 06/22/21 1258   Pulse 93 06/22/21 1359   Resp 20 06/22/21 1359   SpO2 97 % 06/22/21 1400   Vitals shown include unvalidated device data.

## 2021-06-22 NOTE — ANESTHESIA PREPROCEDURE EVALUATION
Relevant Problems   No relevant active problems       Anesthetic History     PONV          Review of Systems / Medical History  Patient summary reviewed, nursing notes reviewed and pertinent labs reviewed    Pulmonary  Within defined limits                 Neuro/Psych       CVA  Dementia     Cardiovascular    Hypertension        Dysrhythmias : atrial fibrillation  CAD and cardiac stents    Exercise tolerance: >4 METS     GI/Hepatic/Renal     GERD           Endo/Other        Arthritis     Other Findings            Physical Exam    Airway  Mallampati: II  TM Distance: 4 - 6 cm  Neck ROM: normal range of motion   Mouth opening: Normal     Cardiovascular  Regular rate and rhythm,  S1 and S2 normal,  no murmur, click, rub, or gallop             Dental  No notable dental hx       Pulmonary  Breath sounds clear to auscultation               Abdominal  GI exam deferred       Other Findings            Anesthetic Plan    ASA: 3  Anesthesia type: general          Induction: Intravenous  Anesthetic plan and risks discussed with: Patient

## 2021-06-22 NOTE — PERIOP NOTES
Handoff Report from Operating Room to PACU    Report received from [facility] and David CRNA regarding Tiffany Lam. Surgeon(s):  Pia Tan MD  And Procedure(s) (LRB):  ENDOSCOPIC RETROGRADE CHOLANGIOPANCREATOGRAPHY (ERCP) WITH BIOPSY, BALLOON DILATION, STONE REMOVAL, SPHINCTEROTOMY, AND STENT PLACEMENT (N/A)  confirmed   with allergies discussed. Anesthesia type, drugs, patient history, complications, estimated blood loss, vital signs, intake and output, and last pain medication were reviewed.

## 2021-06-22 NOTE — PROGRESS NOTES
Problem: Pressure Injury - Risk of  Goal: *Prevention of pressure injury  Description: Document Justin Scale and appropriate interventions in the flowsheet. Outcome: Progressing Towards Goal  Note: Pressure Injury Interventions:  Sensory Interventions: Assess changes in LOC    Moisture Interventions: Absorbent underpads    Activity Interventions: Pressure redistribution bed/mattress(bed type)    Mobility Interventions: Float heels    Nutrition Interventions: Document food/fluid/supplement intake    Friction and Shear Interventions: Apply protective barrier, creams and emollients, HOB 30 degrees or less                Problem: Patient Education: Go to Patient Education Activity  Goal: Patient/Family Education  Outcome: Progressing Towards Goal     Problem: Falls - Risk of  Goal: *Absence of Falls  Description: Document Denae Fall Risk and appropriate interventions in the flowsheet.   Outcome: Progressing Towards Goal  Note: Fall Risk Interventions:  Mobility Interventions: Bed/chair exit alarm    Mentation Interventions: Bed/chair exit alarm    Medication Interventions: Bed/chair exit alarm    Elimination Interventions: Bed/chair exit alarm, Call light in reach    History of Falls Interventions: Bed/chair exit alarm         Problem: Patient Education: Go to Patient Education Activity  Goal: Patient/Family Education  Outcome: Progressing Towards Goal     Problem: Pain  Goal: *Control of Pain  Outcome: Progressing Towards Goal     Problem: Patient Education: Go to Patient Education Activity  Goal: Patient/Family Education  Outcome: Progressing Towards Goal     Problem: Patient Education: Go to Patient Education Activity  Goal: Patient/Family Education  Outcome: Progressing Towards Goal     Problem: Patient Education: Go to Patient Education Activity  Goal: Patient/Family Education  Outcome: Progressing Towards Goal

## 2021-06-22 NOTE — PROGRESS NOTES
Occupational Therapy    Chart reviewed in prep for skilled OT treatment; however, pt LADI for procedure. Will defer and follow up later as able and appropriate.     Thank you,  Lyn Rondon, OT

## 2021-06-22 NOTE — PROGRESS NOTES
End of Shift Note    Bedside shift change report given to Cecile Rn (oncoming nurse) by Sarah Bermudez (offgoing nurse). Report included the following information SBAR, Kardex, Intake/Output and MAR    Shift worked:  07a-7p     Shift summary and any significant changes:     NPO till procdure   Tolerated pain management   Family present    Procedure today: Endoscopic retrograde  Cholangiopancreatography (ERCP)_ with Biopsy. Balloon dialtion, stone removal sphincterotomy and stent placement       Concerns for physician to address:  none     Zone phone for oncoming shift:   None       Activity:  Activity Level: Bed Rest  Number times ambulated in hallways past shift: 0  Number of times OOB to chair past shift: 0    Cardiac:   Cardiac Monitoring: No      Cardiac Rhythm: Atrial Fib    Access:   Current line(s): PIV     Genitourinary:   Urinary status: incontinent    Respiratory:   O2 Device: None (Room air)  Chronic home O2 use?: NO  Incentive spirometer at bedside: YES  Actual Volume (ml): 400 ml  GI:  Last Bowel Movement Date: 06/22/21  Current diet:  ADULT ORAL NUTRITION SUPPLEMENT Breakfast, Lunch, Dinner; Clear Liquid  DIET NPO  Passing flatus: YES  Tolerating current diet: YES       Pain Management:   Patient states pain is manageable on current regimen: YES    Skin:  Justin Score: 16  Interventions: turn team, float heels and increase time out of bed    Patient Safety:  Fall Score:  Total Score: 4  Interventions: bed/chair alarm and gripper socks  High Fall Risk: Yes    Length of Stay:  Expected LOS: 3d 0h  Actual LOS: [address] LPN  13/89/41  8990

## 2021-06-22 NOTE — PROGRESS NOTES
Bedside and Verbal shift change report given to Cecile (oncoming nurse) by Rubio Tolentino (offgoing nurse). Report included the following information SBAR, Kardex, Intake/Output, MAR and Recent Results.

## 2021-06-22 NOTE — PERIOP NOTES
TRANSFER - IN REPORT:    Verbal report received from Cecile RN(name) on Avani Alcantara  being received from General Surgery Room 2180(unit) for ordered procedure      Report consisted of patients Situation, Background, Assessment and   Recommendations(SBAR). Information from the following report(s) SBAR, Kardex, Intake/Output, MAR, Recent Results, Med Rec Status and Procedure Verification was reviewed with the receiving nurse. Opportunity for questions and clarification was provided. Assessment completed upon patients arrival to unit and care assumed.

## 2021-06-22 NOTE — PROGRESS NOTES
Attempted to see pt this am and pt is off floor to OR for procedure. Will defer tx today and tx when pt is available and when cleared by nursing.

## 2021-06-22 NOTE — PROGRESS NOTES
Comprehensive Nutrition Assessment    Type and Reason for Visit: Reassess    Nutrition Recommendations/Plan:   Advance diet as medically able per MD     Nutrition Assessment:   Chart reviewed, pt off the floor for ERCP today. Per RN flowsheet's she has a varied appetite while on a diet. Currently NPO for procedure. K 3.1, being repleted. BM noted today. Will monitor intake and need for supplements upon F/U. Patient Vitals for the past 168 hrs:   % Diet Eaten   06/21/21 1734 51 - 75%   06/21/21 1150 0%   06/21/21 0819 0%   06/20/21 1651 51 - 75%   06/20/21 1137 26 - 50%   06/20/21 0740 0%   06/19/21 1756 1 - 25%   06/19/21 1110 0%   06/19/21 0815 26 - 50%   06/18/21 0305 0%   06/17/21 2019 0%       Malnutrition Assessment:  Malnutrition Status:  Insufficient data        Estimated Daily Nutrient Needs:  Energy (kcal): MSJ 1500 (1158 x 1.3); Weight Used for Energy Requirements: Current  Protein (g): 61-76g (0.8-1gPro/kg); Weight Used for Protein Requirements: Current  Fluid (ml/day): 1500mL; Method Used for Fluid Requirements: 1 ml/kcal      Nutrition Related Findings:  Meds: Colleen [email or web address]. BM 6/22      Wounds:    None       Current Nutrition Therapies:  ADULT ORAL NUTRITION SUPPLEMENT Breakfast, Lunch, Dinner; Clear Liquid  DIET NPO    Anthropometric Measures:  · Height:  5' 4\" (162.6 cm)  · Current Body Wt:  75.8 kg (167 lb 1.7 oz)   · Ideal Body Wt:  120 lbs:  139.3 %   · BMI Category: Overweight (BMI 25.0-29. 9)       Nutrition Diagnosis:   · Inadequate protein-energy intake related to cognitive or neurological impairment, inadequate protein-energy intake, altered GI function as evidenced by NPO or clear liquid status due to medical condition, weight loss  Previous dx resolving.      Nutrition Interventions:   Food and/or Nutrient Delivery: Start oral diet  Nutrition Education and Counseling: No recommendations at this time  Coordination of Nutrition Care: Continue to monitor while inpatient    Goals:  Pt will advance to PO diet and consume >50% of meals in 3-5 days. Nutrition Monitoring and Evaluation:   Behavioral-Environmental Outcomes: None identified  Food/Nutrient Intake Outcomes: Diet advancement/tolerance  Physical Signs/Symptoms Outcomes: Biochemical data, Nutrition focused physical findings, Skin, Weight, GI status    Discharge Planning:     Too soon to determine     Electronically signed by Rica Barbosa RD, [address]  on 6/22/2021 at 2:43 PM    Contact: [phone]

## 2021-06-23 ENCOUNTER — APPOINTMENT (OUTPATIENT)
Dept: GENERAL RADIOLOGY | Age: 86
DRG: 418 | End: 2021-06-23
Attending: SURGERY
Payer: MEDICARE

## 2021-06-23 ENCOUNTER — ANESTHESIA (OUTPATIENT)
Dept: SURGERY | Age: 86
DRG: 418 | End: 2021-06-23
Payer: MEDICARE

## 2021-06-23 PROBLEM — K80.20 GALLSTONES: Status: ACTIVE | Noted: 2021-06-23

## 2021-06-23 LAB
ALBUMIN SERPL-MCNC: 2.4 G/DL (ref 3.5–5)
ALBUMIN/GLOB SERPL: 0.5 {RATIO} (ref 1.1–2.2)
ALP SERPL-CCNC: 330 U/L (ref 45–117)
ALT SERPL-CCNC: 56 U/L (ref 12–78)
ANION GAP SERPL CALC-SCNC: 4 MMOL/L (ref 5–15)
AST SERPL-CCNC: 91 U/L (ref 15–37)
BILIRUB DIRECT SERPL-MCNC: 0.3 MG/DL (ref 0–0.2)
BILIRUB SERPL-MCNC: 1 MG/DL (ref 0.2–1)
BUN SERPL-MCNC: 13 MG/DL (ref 6–20)
BUN/CREAT SERPL: 14 (ref 12–20)
CALCIUM SERPL-MCNC: 9.1 MG/DL (ref 8.5–10.1)
CHLORIDE SERPL-SCNC: 100 MMOL/L (ref 97–108)
CO2 SERPL-SCNC: 28 MMOL/L (ref 21–32)
CREAT SERPL-MCNC: 0.96 MG/DL (ref 0.55–1.02)
GLOBULIN SER CALC-MCNC: 5 G/DL (ref 2–4)
GLUCOSE SERPL-MCNC: 91 MG/DL (ref 65–100)
MAGNESIUM SERPL-MCNC: 2 MG/DL (ref 1.6–2.4)
POTASSIUM SERPL-SCNC: 4.7 MMOL/L (ref 3.5–5.1)
PROT SERPL-MCNC: 7.4 G/DL (ref 6.4–8.2)
SODIUM SERPL-SCNC: 132 MMOL/L (ref 136–145)

## 2021-06-23 PROCEDURE — 77030020829: Performed by: SURGERY

## 2021-06-23 PROCEDURE — 83735 ASSAY OF MAGNESIUM: CPT

## 2021-06-23 PROCEDURE — 77030026438 HC STYL ET INTUB CARD -A: Performed by: ANESTHESIOLOGY

## 2021-06-23 PROCEDURE — 77030019908 HC STETH ESOPH SIMS -A: Performed by: ANESTHESIOLOGY

## 2021-06-23 PROCEDURE — 77030002916 HC SUT ETHLN J&J -A: Performed by: SURGERY

## 2021-06-23 PROCEDURE — 74011000250 HC RX REV CODE- 250: Performed by: SURGERY

## 2021-06-23 PROCEDURE — 99218 HC RM OBSERVATION: CPT

## 2021-06-23 PROCEDURE — 77030008684 HC TU ET CUF COVD -B: Performed by: ANESTHESIOLOGY

## 2021-06-23 PROCEDURE — 80076 HEPATIC FUNCTION PANEL: CPT

## 2021-06-23 PROCEDURE — 77030013567 HC DRN WND RESERV BARD -A: Performed by: SURGERY

## 2021-06-23 PROCEDURE — 74011250636 HC RX REV CODE- 250/636: Performed by: SURGERY

## 2021-06-23 PROCEDURE — 77030002967 HC SUT PDS J&J -B: Performed by: SURGERY

## 2021-06-23 PROCEDURE — 74011000636 HC RX REV CODE- 636: Performed by: SURGERY

## 2021-06-23 PROCEDURE — 74011250637 HC RX REV CODE- 250/637: Performed by: INTERNAL MEDICINE

## 2021-06-23 PROCEDURE — 77010033678 HC OXYGEN DAILY

## 2021-06-23 PROCEDURE — 94760 N-INVAS EAR/PLS OXIMETRY 1: CPT

## 2021-06-23 PROCEDURE — 77030018875 HC APPL CLP LIG4 J&J -B: Performed by: SURGERY

## 2021-06-23 PROCEDURE — 77030008606 HC TRCR ENDOSC KII AMR -B: Performed by: SURGERY

## 2021-06-23 PROCEDURE — 88304 TISSUE EXAM BY PATHOLOGIST: CPT

## 2021-06-23 PROCEDURE — 65270000029 HC RM PRIVATE

## 2021-06-23 PROCEDURE — 77030020061 HC IV BLD WRMR ADMIN SET 3M -B: Performed by: ANESTHESIOLOGY

## 2021-06-23 PROCEDURE — 77030008756 HC TU IRR SUC STRY -B: Performed by: SURGERY

## 2021-06-23 PROCEDURE — 74011250636 HC RX REV CODE- 250/636: Performed by: HOSPITALIST

## 2021-06-23 PROCEDURE — 74011250636 HC RX REV CODE- 250/636: Performed by: REGISTERED NURSE

## 2021-06-23 PROCEDURE — 76060000033 HC ANESTHESIA 1 TO 1.5 HR: Performed by: SURGERY

## 2021-06-23 PROCEDURE — 2709999900 HC NON-CHARGEABLE SUPPLY: Performed by: SURGERY

## 2021-06-23 PROCEDURE — 77030009851 HC PCH RTVR ENDOSC AMR -B: Performed by: SURGERY

## 2021-06-23 PROCEDURE — 36415 COLL VENOUS BLD VENIPUNCTURE: CPT

## 2021-06-23 PROCEDURE — 0FT44ZZ RESECTION OF GALLBLADDER, PERCUTANEOUS ENDOSCOPIC APPROACH: ICD-10-PCS | Performed by: SURGERY

## 2021-06-23 PROCEDURE — 77030020053 HC ELECTRD LAPSCP COVD -B: Performed by: SURGERY

## 2021-06-23 PROCEDURE — 77030008771 HC TU NG SALEM SUMP -A: Performed by: ANESTHESIOLOGY

## 2021-06-23 PROCEDURE — 74300 X-RAY BILE DUCTS/PANCREAS: CPT

## 2021-06-23 PROCEDURE — 74011000250 HC RX REV CODE- 250: Performed by: REGISTERED NURSE

## 2021-06-23 PROCEDURE — 77030031139 HC SUT VCRL2 J&J -A: Performed by: SURGERY

## 2021-06-23 PROCEDURE — 80048 BASIC METABOLIC PNL TOTAL CA: CPT

## 2021-06-23 PROCEDURE — 74300 X-RAY BILE DUCTS/PANCREAS: CPT | Performed by: SURGERY

## 2021-06-23 PROCEDURE — 77030010837 HC CATH CHOL INTRO TELE -B: Performed by: SURGERY

## 2021-06-23 PROCEDURE — 47563 LAPARO CHOLECYSTECTOMY/GRAPH: CPT | Performed by: SURGERY

## 2021-06-23 PROCEDURE — 76010000149 HC OR TIME 1 TO 1.5 HR: Performed by: SURGERY

## 2021-06-23 PROCEDURE — 77030037032 HC INSRT SCIS CLICKLLINE DISP STOR -B: Performed by: SURGERY

## 2021-06-23 PROCEDURE — 77030013079 HC BLNKT BAIR HGGR 3M -A: Performed by: ANESTHESIOLOGY

## 2021-06-23 PROCEDURE — 74011250637 HC RX REV CODE- 250/637: Performed by: SURGERY

## 2021-06-23 PROCEDURE — 77030012770 HC TRCR OPT FX AMR -B: Performed by: SURGERY

## 2021-06-23 PROCEDURE — 76210000006 HC OR PH I REC 0.5 TO 1 HR: Performed by: SURGERY

## 2021-06-23 PROCEDURE — 74011000250 HC RX REV CODE- 250: Performed by: ANESTHESIOLOGY

## 2021-06-23 RX ORDER — PHENYLEPHRINE HCL IN 0.9% NACL 0.4MG/10ML
SYRINGE (ML) INTRAVENOUS AS NEEDED
Status: DISCONTINUED | OUTPATIENT
Start: 2021-06-23 | End: 2021-06-23 | Stop reason: HOSPADM

## 2021-06-23 RX ORDER — FENTANYL CITRATE 50 UG/ML
25 INJECTION, SOLUTION INTRAMUSCULAR; INTRAVENOUS
Status: DISCONTINUED | OUTPATIENT
Start: 2021-06-23 | End: 2021-06-23 | Stop reason: HOSPADM

## 2021-06-23 RX ORDER — ONDANSETRON 2 MG/ML
4 INJECTION INTRAMUSCULAR; INTRAVENOUS AS NEEDED
Status: DISCONTINUED | OUTPATIENT
Start: 2021-06-23 | End: 2021-06-23 | Stop reason: HOSPADM

## 2021-06-23 RX ORDER — SODIUM CHLORIDE 0.9 % (FLUSH) 0.9 %
5-40 SYRINGE (ML) INJECTION AS NEEDED
Status: DISCONTINUED | OUTPATIENT
Start: 2021-06-23 | End: 2021-06-23 | Stop reason: HOSPADM

## 2021-06-23 RX ORDER — BUPIVACAINE HYDROCHLORIDE 2.5 MG/ML
INJECTION, SOLUTION EPIDURAL; INFILTRATION; INTRACAUDAL AS NEEDED
Status: DISCONTINUED | OUTPATIENT
Start: 2021-06-23 | End: 2021-06-23 | Stop reason: HOSPADM

## 2021-06-23 RX ORDER — ONDANSETRON 2 MG/ML
INJECTION INTRAMUSCULAR; INTRAVENOUS AS NEEDED
Status: DISCONTINUED | OUTPATIENT
Start: 2021-06-23 | End: 2021-06-23 | Stop reason: HOSPADM

## 2021-06-23 RX ORDER — LIDOCAINE HYDROCHLORIDE 20 MG/ML
INJECTION, SOLUTION EPIDURAL; INFILTRATION; INTRACAUDAL; PERINEURAL AS NEEDED
Status: DISCONTINUED | OUTPATIENT
Start: 2021-06-23 | End: 2021-06-23 | Stop reason: HOSPADM

## 2021-06-23 RX ORDER — BUPIVACAINE HYDROCHLORIDE 5 MG/ML
INJECTION, SOLUTION EPIDURAL; INTRACAUDAL AS NEEDED
Status: DISCONTINUED | OUTPATIENT
Start: 2021-06-23 | End: 2021-06-23 | Stop reason: HOSPADM

## 2021-06-23 RX ORDER — HYDROCODONE BITARTRATE AND ACETAMINOPHEN 5; 325 MG/1; MG/1
1 TABLET ORAL
Status: DISCONTINUED | OUTPATIENT
Start: 2021-06-23 | End: 2021-06-24 | Stop reason: HOSPADM

## 2021-06-23 RX ORDER — SODIUM CHLORIDE 0.9 % (FLUSH) 0.9 %
5-40 SYRINGE (ML) INJECTION EVERY 8 HOURS
Status: DISCONTINUED | OUTPATIENT
Start: 2021-06-23 | End: 2021-06-23 | Stop reason: HOSPADM

## 2021-06-23 RX ORDER — PROPOFOL 10 MG/ML
INJECTION, EMULSION INTRAVENOUS AS NEEDED
Status: DISCONTINUED | OUTPATIENT
Start: 2021-06-23 | End: 2021-06-23 | Stop reason: HOSPADM

## 2021-06-23 RX ORDER — CEFAZOLIN SODIUM 1 G/3ML
2 INJECTION, POWDER, FOR SOLUTION INTRAMUSCULAR; INTRAVENOUS
Status: DISCONTINUED | OUTPATIENT
Start: 2021-06-23 | End: 2021-06-23 | Stop reason: CLARIF

## 2021-06-23 RX ORDER — GLYCOPYRROLATE 0.2 MG/ML
INJECTION INTRAMUSCULAR; INTRAVENOUS AS NEEDED
Status: DISCONTINUED | OUTPATIENT
Start: 2021-06-23 | End: 2021-06-23 | Stop reason: HOSPADM

## 2021-06-23 RX ORDER — BUPIVACAINE HYDROCHLORIDE AND EPINEPHRINE 2.5; 5 MG/ML; UG/ML
INJECTION, SOLUTION EPIDURAL; INFILTRATION; INTRACAUDAL; PERINEURAL AS NEEDED
Status: DISCONTINUED | OUTPATIENT
Start: 2021-06-23 | End: 2021-06-23 | Stop reason: HOSPADM

## 2021-06-23 RX ORDER — SUCCINYLCHOLINE CHLORIDE 20 MG/ML
INJECTION INTRAMUSCULAR; INTRAVENOUS AS NEEDED
Status: DISCONTINUED | OUTPATIENT
Start: 2021-06-23 | End: 2021-06-23 | Stop reason: HOSPADM

## 2021-06-23 RX ORDER — ROCURONIUM BROMIDE 10 MG/ML
INJECTION, SOLUTION INTRAVENOUS AS NEEDED
Status: DISCONTINUED | OUTPATIENT
Start: 2021-06-23 | End: 2021-06-23 | Stop reason: HOSPADM

## 2021-06-23 RX ORDER — NEOSTIGMINE METHYLSULFATE 1 MG/ML
INJECTION, SOLUTION INTRAVENOUS AS NEEDED
Status: DISCONTINUED | OUTPATIENT
Start: 2021-06-23 | End: 2021-06-23 | Stop reason: HOSPADM

## 2021-06-23 RX ORDER — SODIUM CHLORIDE, SODIUM LACTATE, POTASSIUM CHLORIDE, CALCIUM CHLORIDE 600; 310; 30; 20 MG/100ML; MG/100ML; MG/100ML; MG/100ML
25 INJECTION, SOLUTION INTRAVENOUS CONTINUOUS
Status: DISCONTINUED | OUTPATIENT
Start: 2021-06-23 | End: 2021-06-23 | Stop reason: HOSPADM

## 2021-06-23 RX ORDER — DEXAMETHASONE SODIUM PHOSPHATE 4 MG/ML
INJECTION, SOLUTION INTRA-ARTICULAR; INTRALESIONAL; INTRAMUSCULAR; INTRAVENOUS; SOFT TISSUE AS NEEDED
Status: DISCONTINUED | OUTPATIENT
Start: 2021-06-23 | End: 2021-06-23 | Stop reason: HOSPADM

## 2021-06-23 RX ORDER — FENTANYL CITRATE 50 UG/ML
INJECTION, SOLUTION INTRAMUSCULAR; INTRAVENOUS AS NEEDED
Status: DISCONTINUED | OUTPATIENT
Start: 2021-06-23 | End: 2021-06-23 | Stop reason: HOSPADM

## 2021-06-23 RX ADMIN — Medication 120 MCG: at 13:49

## 2021-06-23 RX ADMIN — PROPOFOL 90 MG: 10 INJECTION, EMULSION INTRAVENOUS at 13:40

## 2021-06-23 RX ADMIN — LIDOCAINE HYDROCHLORIDE 60 MG: 20 INJECTION, SOLUTION EPIDURAL; INFILTRATION; INTRACAUDAL; PERINEURAL at 13:40

## 2021-06-23 RX ADMIN — SODIUM CHLORIDE, POTASSIUM CHLORIDE, SODIUM LACTATE AND CALCIUM CHLORIDE 25 ML/HR: 600; 310; 30; 20 INJECTION, SOLUTION INTRAVENOUS at 12:30

## 2021-06-23 RX ADMIN — BUPIVACAINE HYDROCHLORIDE 15 ML: 2.5 INJECTION, SOLUTION EPIDURAL; INFILTRATION; INTRACAUDAL; PERINEURAL at 14:51

## 2021-06-23 RX ADMIN — Medication 3 AMPULE: at 12:30

## 2021-06-23 RX ADMIN — GLYCOPYRROLATE 0.4 MG: 0.2 INJECTION, SOLUTION INTRAMUSCULAR; INTRAVENOUS at 14:34

## 2021-06-23 RX ADMIN — Medication 5 ML: at 05:06

## 2021-06-23 RX ADMIN — ACETAMINOPHEN 1000 MG: 500 TABLET, FILM COATED ORAL at 05:06

## 2021-06-23 RX ADMIN — NEOSTIGMINE METHYLSULFATE 3 MG: 1 INJECTION, SOLUTION INTRAVENOUS at 14:34

## 2021-06-23 RX ADMIN — ACETAMINOPHEN 1000 MG: 500 TABLET, FILM COATED ORAL at 22:04

## 2021-06-23 RX ADMIN — FENTANYL CITRATE 50 MCG: 50 INJECTION, SOLUTION INTRAMUSCULAR; INTRAVENOUS at 13:40

## 2021-06-23 RX ADMIN — WATER 2 G: 1 INJECTION INTRAMUSCULAR; INTRAVENOUS; SUBCUTANEOUS at 13:45

## 2021-06-23 RX ADMIN — ACETAMINOPHEN 1000 MG: 500 TABLET, FILM COATED ORAL at 16:06

## 2021-06-23 RX ADMIN — PROPOFOL 100 MCG/KG/MIN: 10 INJECTION, EMULSION INTRAVENOUS at 13:53

## 2021-06-23 RX ADMIN — ROCURONIUM BROMIDE 10 MG: 10 INJECTION INTRAVENOUS at 14:04

## 2021-06-23 RX ADMIN — SUCCINYLCHOLINE CHLORIDE 80 MG: 20 INJECTION, SOLUTION INTRAMUSCULAR; INTRAVENOUS at 13:40

## 2021-06-23 RX ADMIN — SODIUM CHLORIDE 50 ML/HR: 9 INJECTION, SOLUTION INTRAVENOUS at 22:13

## 2021-06-23 RX ADMIN — ROCURONIUM BROMIDE 10 MG: 10 INJECTION INTRAVENOUS at 13:40

## 2021-06-23 RX ADMIN — DEXAMETHASONE SODIUM PHOSPHATE 4 MG: 4 INJECTION, SOLUTION INTRAMUSCULAR; INTRAVENOUS at 13:59

## 2021-06-23 RX ADMIN — ONDANSETRON HYDROCHLORIDE 4 MG: 2 INJECTION, SOLUTION INTRAMUSCULAR; INTRAVENOUS at 13:59

## 2021-06-23 NOTE — OP NOTES
DATE OF PROCEDURE:  6/23/2021     PREOPERATIVE DIAGNOSIS:   Gallstones, status post ERCP removal of common bile duct stones and stenting    POSTOPERATIVE DIAGNOSIS:   Same    OPERATIVE PROCEDURE:  Laparoscopic cholecystectomy with cholangiogram, interpretation of cholangiogram (CPT 76903 [id number])    SURGEON:  Arabella Adames MD    ANESTHESIA:  General endotracheal.    EBL: minimal    DRAINS: 19 Fr. Round Hemoduct drain    SPECIMENS:   ID Type Source Tests Collected by Time Destination   1 : gallbladder Preservative Gallbladder  Mila Ybarra MD 6/23/2021 0411 Pathology        FINDINGS:  Chronic inflammation of the gallbladder, gallstones. No CBD filling defect on cholangiogram.  Stent apparent. Brisk emptying. INDICATIONS:  RUQ and epigastric pain. Gallstones status post ERCP removal of common bile duct stones. DESCRIPTION OF PROCEDURE:  After obtaining informed consent, the patient was taken to the operating room and placed supine on the operating table. An operative time-out was performed, and general endotracheal anesthesia was induced. Preoperative antibiotics were administered, and the abdomen was prepped and draped in the usual sterile fashion. The abdomen was then entered just above the umbilicus using a 5-mm optical trocar. The abdomen was the insufflated without incident and was visually explored. There was no other visible pathology noted. Two right upper quadrant 5-mm ports were placed under direct vision, followed by a 12-mm port in the subxiphoid position. Local anesthetic was infiltrated at the port sites prior to placement. The gallbladder fundus was then grasped and retracted cephalad over the liver. The triangle of Calot was exposed, and the cystic duct and artery were skeletonized using a combination of blunt dissection with a [place] dissector and hook electrocautery. The cystic artery was then divided between clips with 2 clips left on the patient side.   The cystic duct was then traced from the gallbladder infundibulum into its insertion into the portal triad. The cystic duct was swept with a grasper up into the gallbladder. A clip was placed on the gallbladder infundibulum and an incision in the cystic duct adjacent to the gallbladder infundibulum was made with saadia and the cholangiogram catheter was inserted and the cholangiogram was performed with the above noted findings. The cystic duct was then divided between clips directly adjacent to the gallbladder infundibulum, with 3 clips left on the patient's side. The gallbladder was then removed from the liver bed using hook electrocautery. It was removed from the abdominal cavity using a bag through the subxiphoid incision. The liver bed was inspected for hemostasis, and excellent hemostasis was achieved with minimal electrocautery. The clips on the cystic duct and artery were again visualized and were intact. The area below and lateral to the liver was suction irrigated until clear. A 19 Fr round Hemoduct drain was left below the liver and made to leave the peritoneum via the lateral-most insicion. It was sutured to the skin with a Nylon suture. The right upper quadrant ports were removed under direct vision and both were hemostatic. The abdomen was then desufflated through the subxiphoid port under direct vision via the umbilical port. These ports were subsequently removed and the fascial defect at the 12-mm incision was closed with an interrupted 0 Vicryl suture. The incisions were irrigated with sterile saline and made hemostatic with minimal electrocautery. They were closed with buried interrupted 4-0 Monocryl sutures, and dressed with sterile dressing. The patient was recovered from general anesthesia and taken to the recovery area in satisfactory condition. All instrument, sponge, and needle counts were reported as correct.     Yessica Grey MD

## 2021-06-23 NOTE — ANESTHESIA PROCEDURE NOTES
Right rectus sheath peripheral nerve block    Start time: 6/23/2021 2:50 PM  End time: 6/23/2021 2:53 PM  Performed by: Kenzie Madera MD  Authorized by: Kenzie Madera MD       Pre-procedure:    Indications: at surgeon's request and post-op pain management    Preanesthetic Checklist: patient identified, risks and benefits discussed, site marked, timeout performed, anesthesia consent given and patient being monitored    Timeout Time: 14:50 EDT          Block Type:   Block Type:  Rectus sheath  Laterality:  Right  Monitoring:  Continuous pulse ox, heart rate, frequent vital sign checks, standard ASA monitoring, responsive to questions and oxygen  Injection Technique:  Single shot  Patient Position: supine  Prep: chlorhexidine    Needle Type:  Stimuplex  Needle Gauge:  20 G  Needle Localization:  Anatomical landmarks and ultrasound guidance    Assessment:  Number of attempts:  1  Injection Assessment:  Incremental injection every 5 mL, no paresthesia, ultrasound image on chart, local visualized surrounding nerve on ultrasound, negative aspiration for blood and no intravascular symptoms  Patient tolerance:  Patient tolerated the procedure well with no immediate complications  Excellent visualization on US  15 ml 0.25% bupivacaine administered

## 2021-06-23 NOTE — INTERDISCIPLINARY ROUNDS
Interdisciplinary Rounds were completed on 06/23/21 for this patient. Rounds included nursing, clinical care leader, pharmacy, and case management. Plan of care discussed. See clinical pathway and/or care plan for interventions and desired outcomes.

## 2021-06-23 NOTE — PERIOP NOTES
1202 - SBAR IN NOTE - PT ARRIVED INTO PRE-OP HOLDING 17. ALERT - ORIENTED TO PERSON , AND PLACE - REORIENTED TO TIME AND SITUATION  AS NEEDED. PERIOTIC CONFUSION NOTED. EASILY RE-ORIENTED. PT DUQUE SPON AND TO COMMAND. RESP EVEN AND UNLABORED. POX ON RA > 94%. BSB AND CLEAR  - DIMINISHED IN BASES. PT DENIES DISCOMFORT - 0/10 ON PAIN SCALE. PRE-OP TCHING DONE WITH PT AND DTR - DTR VERBALIZES UNDERSTANDING. UNABLE TO ASSESS IF PT UNDERSTANDS SECONDARY TO PERIOTIC CONFUSION. 1230 - DR. FIELDS IN TO SPEAK WITH PT AND DTR. CONSENT SIGNED BY  [address]. SR UP X4. CB IN PLACE.

## 2021-06-23 NOTE — PROGRESS NOTES
0900- Spoke with pts daughter in attempt to obtain consent. States that she was unaware of scheduled procedure. NP Azalia Montano notified who notified Dr. Edna Taylor. 1145- Family at bedside. Transport here to take pt down. Family sent with pt. Per Luis Preciado MD will speak to family down stairs.

## 2021-06-23 NOTE — PROGRESS NOTES
Have tried to call daughter twice at the number provided through [facility]. Got voicemail both times. Left a message earlier.

## 2021-06-23 NOTE — PERIOP NOTES
TRANSFER - OUT REPORT:    Verbal report given to Pierre David LPN(name) on Chayo Caban  being transferred to 2180(unit) for routine post - op       Report consisted of patients Situation, Background, Assessment and   Recommendations(SBAR). Information from the following report(s) OR Summary, Procedure Summary, Intake/Output and MAR was reviewed with the receiving nurse. Opportunity for questions and clarification was provided.       Patient transported with:   O2 @ 2 liters  Registered Nurse

## 2021-06-23 NOTE — PROGRESS NOTES
End of Shift Note    Bedside shift change report given to Rn (oncoming nurse) by Yanira Guan (offgoing nurse). Report included the following information SBAR, Kardex, Intake/Output and MAR    Shift worked:  07a-7p     Shift summary and any significant changes:    tolerated clear liquid diet   Tolerated pain management   Cholecystectomy laparoscopic with grams    family present  Room air   Concerns for physician to address:  None    Zone phone for oncoming shift:   None       Activity:  Activity Level: Bed Rest  Number times ambulated in hallways past shift: 0  Number of times OOB to chair past shift: 0    Cardiac:   Cardiac Monitoring: No      Cardiac Rhythm: Atrial Fib    Access:   Current line(s): PIV     Genitourinary:   Urinary status: oliguric    Respiratory:   O2 Device: Nasal cannula  Chronic home O2 use?: NO  Incentive spirometer at bedside: YES  Actual Volume (ml): 300 ml  GI:  Last Bowel Movement Date: 06/22/21  Current diet:  ADULT ORAL NUTRITION SUPPLEMENT Breakfast, Lunch, Dinner; Clear Liquid  ADULT DIET Clear Liquid  Passing flatus: YES  Tolerating current diet: YES       Pain Management:   Patient states pain is manageable on current regimen: YES    Skin:  Justin Score: 17  Interventions: turn team, speciality bed, foam dressing and PT/OT consult    Patient Safety:  Fall Score:  Total Score: 4  Interventions: bed/chair alarm and gripper socks  High Fall Risk: Yes    Length of Stay:  Expected LOS: 3d 0h  Actual LOS: Chino Meyer LPN  90/16/849  [id number]

## 2021-06-23 NOTE — ANESTHESIA PREPROCEDURE EVALUATION
Relevant Problems   RENAL FAILURE   (+) NADIA (acute kidney injury) ([facility] [address]       Anesthetic History     PONV          Review of Systems / Medical History  Patient summary reviewed and pertinent labs reviewed    Pulmonary  Within defined limits                 Neuro/Psych       CVA  TIA, psychiatric history and dementia     Cardiovascular    Hypertension        Dysrhythmias : atrial fibrillation  CAD      Comments: EKG   Atrial fibrillation   GI/Hepatic/Renal  Within defined limits              Endo/Other        Arthritis     Other Findings            Physical Exam    Airway  Mallampati: II  TM Distance: > 6 cm  Neck ROM: normal range of motion   Mouth opening: Normal     Cardiovascular  Regular rate and rhythm,  S1 and S2 normal,  no murmur, click, rub, or gallop  Rhythm: regular  Rate: normal         Dental  No notable dental hx       Pulmonary  Breath sounds clear to auscultation               Abdominal  GI exam deferred       Other Findings            Anesthetic Plan    ASA: 3  Anesthesia type: general    Monitoring Plan: BIS  Post-op pain plan if not by surgeon: peripheral nerve block single    Induction: Intravenous  Anesthetic plan and risks discussed with: Patient      TAP/rectus sheath pre-emergence

## 2021-06-23 NOTE — PROGRESS NOTES
Chart reviewed for OT treatment, note that patient is having cholecystectomy today. Will defer. Will see patient for OT re-evaluation when medically stable/new OT orders received. Thank you.

## 2021-06-23 NOTE — PROGRESS NOTES
End of Shift Note    Bedside shift change report given to Marilynn Diaz (oncoming nurse) by Tamiko Kramer RN (offgoing nurse). Report included the following information SBAR, Kardex, Intake/Output, MAR and Recent Results    Shift worked:  7p-7a     Shift summary and any significant changes:     Attempt made last night to reach daughter for procedure consent but no answer. Patient has remained NPO since midnight. Voiding with no difficulties. Concerns for physician to address:  N/A       Zone phone for oncoming shift:   [phone]       Activity:  Activity Level: Bed Rest  Number times ambulated in hallways past shift: 0  Number of times OOB to chair past shift: 0    Cardiac:   Cardiac Monitoring: No      Cardiac Rhythm: Atrial Fib    Access:   Current line(s): PIV     Genitourinary:   Urinary status: voiding and incontinent    Respiratory:   O2 Device: None (Room air)  Chronic home O2 use?: NO  Incentive spirometer at bedside: NO  Actual Volume (ml): 400 ml  GI:  Last Bowel Movement Date: 06/22/21  Current diet:  ADULT ORAL NUTRITION SUPPLEMENT Breakfast, Lunch, Dinner; Clear Liquid  DIET NPO  Passing flatus: YES  Tolerating current diet: YES       Pain Management:   Patient states pain is manageable on current regimen: YES    Skin:  Justin Score: 17  Interventions: turn team, float heels and PT/OT consult    Patient Safety:  Fall Score:  Total Score: 4  Interventions: bed/chair alarm, gripper socks and pt to call before getting OOB  High Fall Risk: Yes    Length of Stay:  Expected LOS: 3d 0h  Actual LOS: Nahid Sparks RN

## 2021-06-23 NOTE — PERIOP NOTES
Handoff Report from Operating Room to PACU    Report received from Essence Tesfaye RN and Maricarmen Khanna CRNA regarding Steffanie Tejeda. Surgeon(s):  Deirdre Arredondo MD  And Procedure(s) (LRB):  CHOLECYSTECTOMY LAPAROSCOPIC WITH GRAMS (N/A)  confirmed   with drains and dressings discussed. Anesthesia type, drugs, patient history, complications, estimated blood loss, vital signs, intake and output, and last pain medication, lines, reversal medications and temperature were reviewed.

## 2021-06-23 NOTE — ANESTHESIA POSTPROCEDURE EVALUATION
Procedure(s):  CHOLECYSTECTOMY LAPAROSCOPIC WITH GRAMS. general    Anesthesia Post Evaluation      Multimodal analgesia: multimodal analgesia used between 6 hours prior to anesthesia start to PACU discharge  Patient location during evaluation: bedside  Patient participation: complete - patient participated  Level of consciousness: awake  Pain management: adequate  Airway patency: patent  Anesthetic complications: no  Cardiovascular status: acceptable  Respiratory status: acceptable  Hydration status: acceptable  Post anesthesia nausea and vomiting:  controlled  Final Post Anesthesia Temperature Assessment:  Normothermia (36.0-37.5 degrees C)      INITIAL Post-op Vital signs:   Vitals Value Taken Time   /61 06/23/21 1530   Temp 36.4 °C (97.6 °F) 06/23/21 1503   Pulse 105 06/23/21 1544   Resp 22 06/23/21 1544   SpO2 93 % 06/23/21 1544   Vitals shown include unvalidated device data.

## 2021-06-23 NOTE — PROGRESS NOTES
Hospitalist Progress Note    NAME: Cosmo Leija   :  3/16/1930   MRN:  [id number]     Interim Hospital Summary: 80 y.o. female whom presented on 6/15/2021 with      Assessment / Plan:    Cholelithiasis   Choledocholithiasis   LFT elevation  Nausea vomiting  Anorexia and weight loss  -CT abdomen  1. Cholelithiasis, with dilated common bile duct 12 mm. Choledocholithiasis as  well is irregular wall thickening of the distal common bile duct. Cannot exclude  cholangiocarcinoma. 2.  Uterine fibroids. -MRCP: Cholelithiasis with numerous common hepatic and common bile duct stones. S/p ERCP -multiple biliary stones,s/p biliary stent. Large sphincterectomy performed  -Plan for lap marcie today  -No systemic anticoagulation/antiplatelets for 10 days  -Repeat ERCP in 2 to 3 months  -Follow-up pathology      Atrial fibrillation, probably chronic  CAD  -Holding aspirin   -Lisinopril and Lasix held due to soft blood pressure.   -continue metoprolol   -continue holding coumadin in anticipation of ERCP    Dementia  -mood stable    25.0 - 29.9 Overweight / Body mass index is 28.67 kg/m². Estimated discharge date:   Barriers: None    Code status: Full  Prophylaxis: SCD's  Recommended Disposition:  PT, OT, RN       Subjective:     Chief Complaint / Reason for Physician Visit  Follow up of gallstones, anorexia, dilated common bile duct, A. fib  Chart reviewed in detail. Discussed with RN events overnight. Had ERCP yesterday. Plan for lap marcie today currently n.p.o. Review of Systems:  Symptom Y/N Comments  Symptom Y/N Comments   Fever/Chills    Chest Pain     Poor Appetite    Edema     Cough    Abdominal Pain     Sputum    Joint Pain     SOB/YANES    Pruritis/Rash     Nausea/vomit    Tolerating PT/OT     Diarrhea    Tolerating Diet     Constipation    Other       Could NOT obtain due to:      PO intake: No data found.   Objective:     VITALS: Last 24hrs VS reviewed since prior progress note. Most recent are:  Patient Vitals for the past 24 hrs:   Temp Pulse Resp BP SpO2   06/23/21 1520  (!) 111 20 (!) 141/75 100 %   06/23/21 1515  (!) 111 21 128/87 100 %   06/23/21 1510  (!) 111 19 123/83 98 %   06/23/21 1505  (!) 112 24 107/71 94 %   06/23/21 1503 97.6 °F (36.4 °C) (!) 118 14 132/88 100 %   06/23/21 1501  (!) 116 13 132/88 100 %   06/23/21 1202 98 °F (36.7 °C) 83 22 128/60 93 %   06/23/21 1124 97.9 °F (36.6 °C) 94 18 121/69 95 %   06/23/21 0757 97.9 °F (36.6 °C) 65 18 (!) 113/52 97 %   06/23/21 0531 97.8 °F (36.6 °C) 82 19 112/77 100 %   06/23/21 0028 97.7 °F (36.5 °C) 89 19 110/70 100 %   06/22/21 2029 97.5 °F (36.4 °C) 83 19 110/67 100 %       Intake/Output Summary (Last 24 hours) at 6/23/2021 1532  Last data filed at 6/23/2021 1345  Gross per 24 hour   Intake 153.33 ml   Output    Net 153.33 ml        I had a face to face encounter, and independently examined this patient on 6/23/2021, as outlined below:  PHYSICAL EXAM:  General: WD, WN. Alert, cooperative, no acute distress    EENT:  EOMI. Anicteric sclerae. MMM  Resp:  CTA bilaterally, no wheezing or rales. No accessory muscle use  CV:  Regular  rhythm,  No edema  GI:  Soft, Non distended, Non tender. +Bowel sounds  Neurologic:  Alert and oriented X 3, normal speech,   Psych:   Good insight. Not anxious nor agitated  Skin:  No rashes.   No jaundice    Reviewed most current lab test results and cultures  YES  Reviewed most current radiology test results   YES  Review and summation of old records today    NO  Reviewed patient's current orders and MAR    YES  PMH/SH reviewed - no change compared to H&P  ________________________________________________________________________  Care Plan discussed with:    Comments   Patient x    Family  x    RN     Care Manager     Consultant                        Multidiciplinary team rounds were held today with , nursing, pharmacist and clinical coordinator. Patient's plan of care was discussed; medications were reviewed and discharge planning was addressed. ________________________________________________________________________  Total NON critical care TIME:  25    Minutes    Total CRITICAL CARE TIME Spent:   Minutes non procedure based      Comments   >50% of visit spent in counseling and coordination of care x     This includes time during multidisciplinary rounds if indicated above   ________________________________________________________________________  Felix Lara MD     Procedures: see electronic medical records for all procedures/Xrays and details which were not copied into this note but were reviewed prior to creation of Plan. LABS:  I reviewed today's most current labs and imaging studies. Pertinent labs include:  No results for input(s): WBC, HGB, HCT, PLT, HGBEXT, HCTEXT, PLTEXT, HGBEXT, HCTEXT, PLTEXT in the last 72 hours.   Recent Labs     06/23/21  0338 06/22/21  0358 06/21/21  0633   * 134* 134*   K 4.7 3.1* 3.1*    98 97   CO2 28 31 34*   GLU 91 66 69   BUN 13 11 12   CREA 0.96 0.74 0.73   CA 9.1 9.0 8.9   MG 2.0 1.7  --    ALB 2.4* 2.3* 2.3*   TBILI 1.0 1.3* 1.7*   ALT 56 66 60   INR  --  1.2* 1.2*

## 2021-06-23 NOTE — PROGRESS NOTES
Call to caregiver, states pt woke up a couple days ago and his eye was blood shot red with yellow crusty material. Stated eye did clear up so thought maybe pt might have poked himself in the eye. Stated woke up today and very red eye sl puffy. Stated pt did not want to get up today, so wondered if he was having some pain. Wondering if pt needs to be seen or could something be called in. Will review with dr. Corado and call back   GI Progress Note Noreen Baker)  NAME:Ciara Montejo :3/16/1930 LXZ:[id number]   PCP: Josiah Deleon MD  Date/Time:  2021 11:30 AM   Assessment:   · Choledocholithiasis/Cholelithiasis  · AFIB previously on Coumadin  · N/v s/p UGI with deformed antrum and pyloru  · Elevated LFT's 2/2 above; improved  · S/p EGD/ERCP on :  Given patient's presenting complaint's of nausea and vomiting and UGI showing distorted antrum/pylorus I started with standard EGD scope:     1. Normal proximal, mid, and distal esophagus  2. Moderate, diffuse, atrophic gastropathy in fundus and body. Biopsied  3. Distorted distal antrum/pylorus (?prior PUD). Given abnormal UGI and patients symptoms of n/v this was dilated to 15 mm with a Pyloric dilating balloon  4. Stomach otherwise normal, including retroflexion  5. Normal duodenal bulb and 2nd/3rd portion of the duodenum     ERCP:  1. Numerous stones (too many to count) throughout the entire biliary tree with resultant severe biliary dilation  2. Large sphincterotomy performed  3. Balloon sweeps performed with a 15 mm balloon with removal of multiple stones. Multiple sweeps continued to remove debris/sludge. I could easily pull a fully inflated 15 mm balloon through the ampulla  4. Given severe stone burden, a 10 mm by 6 cm FC-SEMS was deployed in ideal location     Plan:   1. NPO for cholecystectomy today with Dr. Lis Islas; his assistance is much appreciated  2. Follow up pathology  3. Repeat ERCP in 2-3 months with stent removal and confirmation of cleared CBD   4. No systemic anticoagulation/anti-platelet agents (I.e. Coumadin) x 10 days (from )    GI will sign off.  Pt will need to follow up with myself/ALBINO upon discharge       Subjective:   Pt feels great today without any n/v. NPO for cholecystectomy today    Complaint Y/N Description   Abdominal Pain     Hematemesis     Hematochezia     Melena     Constipation     Diarrhea     Dyspepsia     Dysphagia     Jaundiced Nausea/vomiting       Review of Systems:  Symptom Y/N Comments  Symptom Y/N Comments   Fever/Chills    Chest Pain     Cough    Headaches     Sputum    Joint Pain     SOB/YANES    Pruritis/Rash     Tolerating Diet    Other       Could NOT obtain due to:      Objective:   VITALS:   Last 24hrs VS reviewed since prior progress note. Most recent are:  Visit Vitals  /69   Pulse 94   Temp 97.9 °F (36.6 °C)   Resp 18   Ht 5' 4\" (1.626 m)   Wt 75.8 kg (167 lb)   SpO2 95%   BMI 28.67 kg/m²       Intake/Output Summary (Last 24 hours) at 6/23/2021 1130  Last data filed at 6/23/2021 0113  Gross per 24 hour   Intake 1083.33 ml   Output    Net 1083.33 ml     PHYSICAL EXAM:  General: WD, WN. Alert, cooperative, no acute distress    HEENT: NC, Atraumatic. PERRLA, EOMI. Anicteric sclerae. Lungs:  CTA Bilaterally. No Wheezing/Rhonchi/Rales. Heart:  Regular  rhythm,  No murmur (), No Rubs, No Gallops  Abdomen: Soft, Non distended, Non tender.  +Bowel sounds, no HSM  Extremities: No c/c/e  Neurologic:  No acute neurological distress     Lab and Radiology Data Reviewed: (see below)    Medications Reviewed: (see below)  PMH/SH reviewed - no change compared to H&P  ________________________________________________________________________  Care Plan discussed with:  Patient x   Family     RN               Consultant:  robb Montaño MD     Procedures: see electronic medical records for all procedures/Xrays and details which were not copied into this note but were reviewed prior to creation of Plan. LABS:  No results for input(s): WBC, HGB, HCT, PLT, HGBEXT, HCTEXT, PLTEXT in the last 72 hours.   Recent Labs     06/23/21  0338 06/22/21  0358 06/21/21  0633   * 134* 134*   K 4.7 3.1* 3.1*    98 97   CO2 28 31 34*   BUN 13 11 12   CREA 0.96 0.74 0.73   GLU 91 66 69   CA 9.1 9.0 8.9   MG 2.0 1.7  --      Recent Labs     06/23/21  0338 06/22/21  0358 06/21/21 0633   * 370* 229*   TP 7.4 7.1 7.2   ALB 2. 4* 2.3* 2.3*   GLOB 5.0* 4.8* 4.9*   LPSE  --  76  --      Recent Labs     06/22/21  0358 06/21/21  0633   INR 1.2* 1.2*   PTP 12.5* 12.5*      No results for input(s): FE, TIBC, PSAT, FERR in the last 72 hours. No results found for: FOL, RBCF  No results for input(s): PH, PCO2, PO2 in the last 72 hours. No results for input(s): CPK, CKMB in the last 72 hours.     No lab exists for component: TROPONINI  Lab Results   Component Value Date/Time    Color YELLOW/STRAW 06/15/2021 11:18 PM    Appearance CLEAR 06/15/2021 11:18 PM    Specific gravity 1.005 06/15/2021 11:18 PM    Specific gravity 1.022 05/06/2019 07:58 PM    pH (UA) 6.0 06/15/2021 11:18 PM    Protein Negative 06/15/2021 11:18 PM    Glucose Negative 06/15/2021 11:18 PM    Ketone Negative 06/15/2021 11:18 PM    Bilirubin Negative 06/15/2021 11:18 PM    Urobilinogen 1.0 06/15/2021 11:18 PM    Nitrites Negative 06/15/2021 11:18 PM    Leukocyte Esterase SMALL (A) 06/15/2021 11:18 PM    Epithelial cells FEW 06/15/2021 11:18 PM    Bacteria 1+ (A) 06/15/2021 11:18 PM    WBC 5-10 06/15/2021 11:18 PM    RBC 0-5 06/15/2021 11:18 PM       MEDICATIONS:  Current Facility-Administered Medications   Medication Dose Route Frequency    famotidine (PEPCID) tablet 20 mg  20 mg Oral DAILY    metoprolol succinate (TOPROL-XL) XL tablet 25 mg  25 mg Oral DAILY    acetaminophen (TYLENOL) tablet 1,000 mg  1,000 mg Oral Q8H    ziprasidone (GEODON) 10 mg in sterile water (preservative free) 0.5 mL injection  10 mg IntraMUSCular Q8H PRN    ondansetron (ZOFRAN) injection 4 mg  4 mg IntraVENous Q1H PRN    sodium chloride (NS) flush 5-40 mL  5-40 mL IntraVENous Q8H    sodium chloride (NS) flush 5-40 mL  5-40 mL IntraVENous PRN    acetaminophen (TYLENOL) tablet 650 mg  650 mg Oral Q6H PRN    Or    acetaminophen (TYLENOL) suppository 650 mg  650 mg Rectal Q6H PRN    polyethylene glycol (MIRALAX) packet 17 g  17 g Oral DAILY PRN    ondansetron (ZOFRAN ODT) tablet 4 mg  4 mg Oral Q8H PRN    Or    ondansetron (ZOFRAN) injection 4 mg  4 mg IntraVENous Q6H PRN    0.9% sodium chloride infusion  50 mL/hr IntraVENous CONTINUOUS

## 2021-06-23 NOTE — PROGRESS NOTES
Transition of Care Plan:     RUR: 16% LOW  Disposition: TBD- Home w/ HH  Follow up appointments: PCP & specialists as needed  DME needed: TBD  Transportation at [place] [id number] or means to access home:  No  IM Medicare letter: Needed at d/c  Caregiver Contact: Dtr/ MPOTAYLOR- Anjel Bryant ([phone])  2nd- Mellisa Peterson ([phone])  Discharge Caregiver contacted prior to discharge?     CM reviewed pt's chart. Pt tentatively scheduled for laparoscopic operation today. Per family's request, CM sent [facility] (SN/PT/OT) referrals to [facility], [facility], [facility], & [address]. Will report updates as they become available.      Kaykay Gayle MSW  Care Management

## 2021-06-23 NOTE — PERIOP NOTES
TRANSFER - IN REPORT:    [facility]  being received from 2180 for ordered procedure      Report consisted of patients Situation, Background, Assessment and   Recommendations(SBAR). Information from the following report(s) SBAR, Kardex, Intake/Output, MAR and Recent Results was reviewed with the receiving nurse. Opportunity for questions and clarification was provided. Assessment completed upon patients arrival to unit and care assumed.

## 2021-06-23 NOTE — PROGRESS NOTES
Problem: Pressure Injury - Risk of  Goal: *Prevention of pressure injury  Description: Document Justin Scale and appropriate interventions in the flowsheet. Outcome: Progressing Towards Goal  Note: Pressure Injury Interventions:  Sensory Interventions: Assess changes in LOC    Moisture Interventions: Absorbent underpads    Activity Interventions: Increase time out of bed    Mobility Interventions: PT/OT evaluation    Nutrition Interventions: Document food/fluid/supplement intake    Friction and Shear Interventions: Apply protective barrier, creams and emollients, HOB 30 degrees or less                Problem: Patient Education: Go to Patient Education Activity  Goal: Patient/Family Education  Outcome: Progressing Towards Goal     Problem: Falls - Risk of  Goal: *Absence of Falls  Description: Document Denae Fall Risk and appropriate interventions in the flowsheet.   Outcome: Progressing Towards Goal  Note: Fall Risk Interventions:  Mobility Interventions: Bed/chair exit alarm    Mentation Interventions: Bed/chair exit alarm    Medication Interventions: Bed/chair exit alarm    Elimination Interventions: Bed/chair exit alarm    History of Falls Interventions: Bed/chair exit alarm         Problem: Patient Education: Go to Patient Education Activity  Goal: Patient/Family Education  Outcome: Progressing Towards Goal     Problem: Pain  Goal: *Control of Pain  Outcome: Progressing Towards Goal     Problem: Patient Education: Go to Patient Education Activity  Goal: Patient/Family Education  Outcome: Progressing Towards Goal     Problem: Patient Education: Go to Patient Education Activity  Goal: Patient/Family Education  Outcome: Progressing Towards Goal     Problem: Patient Education: Go to Patient Education Activity  Goal: Patient/Family Education  Outcome: Progressing Towards Goal

## 2021-06-23 NOTE — PROGRESS NOTES
Chart reviewed for PT treatment, note that patient is having cholecystectomy today. Will defer. Will see patient for PT re-evaluation when medically stable/new PT orders received. Thank you.

## 2021-06-24 VITALS
WEIGHT: 167 LBS | DIASTOLIC BLOOD PRESSURE: 60 MMHG | RESPIRATION RATE: 17 BRPM | OXYGEN SATURATION: 96 % | BODY MASS INDEX: 28.51 KG/M2 | TEMPERATURE: 97.8 F | SYSTOLIC BLOOD PRESSURE: 105 MMHG | HEART RATE: 98 BPM | HEIGHT: 64 IN

## 2021-06-24 PROBLEM — K80.50 CHOLEDOCHOLITHIASIS: Status: ACTIVE | Noted: 2021-06-24

## 2021-06-24 LAB
ALBUMIN SERPL-MCNC: 2.5 G/DL (ref 3.5–5)
ALBUMIN/GLOB SERPL: 0.5 {RATIO} (ref 1.1–2.2)
ALP SERPL-CCNC: 301 U/L (ref 45–117)
ALT SERPL-CCNC: 41 U/L (ref 12–78)
ANION GAP SERPL CALC-SCNC: 5 MMOL/L (ref 5–15)
AST SERPL-CCNC: 74 U/L (ref 15–37)
BILIRUB DIRECT SERPL-MCNC: 0.3 MG/DL (ref 0–0.2)
BILIRUB SERPL-MCNC: 0.7 MG/DL (ref 0.2–1)
BUN SERPL-MCNC: 14 MG/DL (ref 6–20)
BUN/CREAT SERPL: 15 (ref 12–20)
CALCIUM SERPL-MCNC: 9.2 MG/DL (ref 8.5–10.1)
CHLORIDE SERPL-SCNC: 101 MMOL/L (ref 97–108)
CO2 SERPL-SCNC: 29 MMOL/L (ref 21–32)
CREAT SERPL-MCNC: 0.91 MG/DL (ref 0.55–1.02)
GLOBULIN SER CALC-MCNC: 5.1 G/DL (ref 2–4)
GLUCOSE SERPL-MCNC: 96 MG/DL (ref 65–100)
HCYS SERPL-SCNC: 10 UMOL/L (ref 3.7–13.9)
POTASSIUM SERPL-SCNC: 4.3 MMOL/L (ref 3.5–5.1)
PROT SERPL-MCNC: 7.6 G/DL (ref 6.4–8.2)
SODIUM SERPL-SCNC: 135 MMOL/L (ref 136–145)
VIT B12 SERPL-MCNC: 1953 PG/ML (ref 193–986)

## 2021-06-24 PROCEDURE — 97530 THERAPEUTIC ACTIVITIES: CPT

## 2021-06-24 PROCEDURE — 94760 N-INVAS EAR/PLS OXIMETRY 1: CPT

## 2021-06-24 PROCEDURE — 82607 VITAMIN B-12: CPT

## 2021-06-24 PROCEDURE — 74011250637 HC RX REV CODE- 250/637: Performed by: INTERNAL MEDICINE

## 2021-06-24 PROCEDURE — 97164 PT RE-EVAL EST PLAN CARE: CPT

## 2021-06-24 PROCEDURE — 99218 HC RM OBSERVATION: CPT

## 2021-06-24 PROCEDURE — 83090 ASSAY OF HOMOCYSTEINE: CPT

## 2021-06-24 PROCEDURE — 36415 COLL VENOUS BLD VENIPUNCTURE: CPT

## 2021-06-24 PROCEDURE — 77010033678 HC OXYGEN DAILY

## 2021-06-24 PROCEDURE — 80048 BASIC METABOLIC PNL TOTAL CA: CPT

## 2021-06-24 PROCEDURE — 2709999900 HC NON-CHARGEABLE SUPPLY

## 2021-06-24 PROCEDURE — 74011250636 HC RX REV CODE- 250/636: Performed by: HOSPITALIST

## 2021-06-24 PROCEDURE — 97535 SELF CARE MNGMENT TRAINING: CPT

## 2021-06-24 PROCEDURE — 83921 ORGANIC ACID SINGLE QUANT: CPT

## 2021-06-24 PROCEDURE — 74011250637 HC RX REV CODE- 250/637: Performed by: NURSE PRACTITIONER

## 2021-06-24 PROCEDURE — 80076 HEPATIC FUNCTION PANEL: CPT

## 2021-06-24 RX ORDER — SODIUM CHLORIDE 0.9 % (FLUSH) 0.9 %
5-40 SYRINGE (ML) INJECTION AS NEEDED
Status: DISCONTINUED | OUTPATIENT
Start: 2021-06-24 | End: 2021-06-24 | Stop reason: HOSPADM

## 2021-06-24 RX ORDER — GUAIFENESIN 100 MG/5ML
81 LIQUID (ML) ORAL DAILY
Qty: 30 TABLET | Refills: 0 | Status: SHIPPED
Start: 2021-06-24

## 2021-06-24 RX ORDER — SODIUM CHLORIDE 0.9 % (FLUSH) 0.9 %
5-40 SYRINGE (ML) INJECTION EVERY 8 HOURS
Status: DISCONTINUED | OUTPATIENT
Start: 2021-06-24 | End: 2021-06-24 | Stop reason: HOSPADM

## 2021-06-24 RX ORDER — WARFARIN SODIUM 5 MG/1
5 TABLET ORAL DAILY
Qty: 30 TABLET | Refills: 0 | Status: SHIPPED
Start: 2021-06-24

## 2021-06-24 RX ADMIN — Medication 5 ML: at 05:30

## 2021-06-24 RX ADMIN — ACETAMINOPHEN 1000 MG: 500 TABLET, FILM COATED ORAL at 05:29

## 2021-06-24 RX ADMIN — Medication 10 ML: at 14:00

## 2021-06-24 RX ADMIN — FAMOTIDINE 20 MG: 20 TABLET ORAL at 09:59

## 2021-06-24 RX ADMIN — METOPROLOL SUCCINATE 25 MG: 25 TABLET, EXTENDED RELEASE ORAL at 10:01

## 2021-06-24 RX ADMIN — SODIUM CHLORIDE 50 ML/HR: 9 INJECTION, SOLUTION INTRAVENOUS at 05:30

## 2021-06-24 RX ADMIN — ACETAMINOPHEN 1000 MG: 500 TABLET, FILM COATED ORAL at 16:27

## 2021-06-24 NOTE — PROGRESS NOTES
Problem: Self Care Deficits Care Plan (Adult)  Goal: *Acute Goals and Plan of Care (Insert Text)  Description:   FUNCTIONAL STATUS PRIOR TO ADMISSION: Pt lives with her daughter. Pt is unable to report her PLOF due to history of dementia. Per medical record daughter assisted pt prn. HOME SUPPORT: The patient lived with her daughter. Occupational Therapy Goals  Initiated 6/21/2021  1. Patient will perform grooming in standing with minimal assistance within 7 day(s). 2.  Patient will perform bathing with moderate assistance  within 7 day(s). 3.  Patient will perform upper body dressing with minimal assistance/contact guard assist within 7 day(s). 4.  Patient will perform toilet transfers with moderate assistance  within 7 day(s). 5.  Patient will perform all aspects of toileting with minimal assistance within 7 day(s). 6.  Patient will participate in upper extremity therapeutic exercise/activities with supervision/set-up for 5 minutes within 7 day(s). Outcome: Progressing Towards Goal   OCCUPATIONAL THERAPY TREATMENT  Patient: Ritchie Schafer (7 y.o. female)  Date: 6/24/2021  Diagnosis: Nausea & vomiting [R11.2]  Anorexia [R63.0]  Cholelithiases [K80.20]  NADIA (acute kidney injury) ([facility] [address] [N17.9]  Hypokalemia [E87.6]  Gallstones [K80.20]  Choledocholithiasis [K80.50] <principal problem not specified>  Procedure(s) (LRB):  CHOLECYSTECTOMY LAPAROSCOPIC WITH GRAMS (N/A) 1 Day Post-Op  Precautions: Fall  Chart, occupational therapy assessment, plan of care, and goals were reviewed. ASSESSMENT  Patient continues with skilled OT services and is slowly progressing towards goals. Patient w/ lap marcie performed yesterday, this date received in bed with no reports of pain and agreeable to session. Patient required overall min assist x2 for bed mobility and sit<>stand, with poor tolerance for standing position.  Patient unable to sustain stand long enough for BP reading, requiring return to seated after <30 seconds when LEs became tremulous. Note patient orthostatic with reports of feeling lightheaded. Patient BP recovering after several minutes seated, however patient still reporting not feeling well and not able to tolerate second trial stand. Patient scooting laterally at EOB with min assist, returning to supine for rest with significant fatigue reported. Note patient's family present this session w/ patient and family fairly receptive to education on orthostatic hypotension and potential implications on home safety and patient fall risk. At this time, patient would be unable to tolerate even a BSC transfer with no steps taken this session. Patient remains limited by generalized weakness, impaired functional mobility and balance, decreased activity tolerance, and orthostatic hypotension. Recommend discharge to SNF rehab for patient safety and to facilitate max functional gains. Patient requires 24/7 supervision and assistance, skilled therapy services, and regular monitoring of vitals, signs/symptoms. Supine: BP  113/57, HR 65  Sitting EOB: /56, HR 95  Sitting, s/p stand for <30 seconds (unable to obtain standing pressure): BP 85/69  Sitting EOB: /80  Sitting EOB, s/p several minutes rest: /67    Current Level of Function Impacting Discharge (ADLs): up to total A; poor activity tolerance    Other factors to consider for discharge: orthostatic; high fall risk; needs 24/7 supervision/assist         PLAN :  Patient continues to benefit from skilled intervention to address the above impairments. Continue treatment per established plan of care to address goals.     Recommend with staff: Chair position in bed for meals and self-care as BP tolerates; encourage active participation in self-care as able    Recommendation for discharge: (in order for the patient to meet his/her long term goals)  Therapy up to 5 days/week in SNF setting    This discharge recommendation:  Has been made in collaboration with the attending provider and/or case management    IF patient discharges home will need the following DME: bedside commode, hospital bed, and wheelchair       SUBJECTIVE:   Patient stated I don't feel too good.     OBJECTIVE DATA SUMMARY:   Cognitive/Behavioral Status:  Neurologic State: Alert  Orientation Level: Oriented to person;Disoriented to time  Cognition: Follows commands;Decreased attention/concentration  Perception: Appears intact  Perseveration: No perseveration noted  Safety/Judgement: Awareness of environment; Fall prevention;Home safety    Functional Mobility and Transfers for ADLs:  Bed Mobility:  Supine to Sit: Minimum assistance;Assist x2; Additional time  Sit to Supine: Moderate assistance;Assist x2  Scooting: Minimum assistance;Assist x1    Transfers:  Sit to Stand: Minimum assistance;Assist x2   Stand to Sit: Minimum assistance     Balance:  Sitting - Static: Good (unsupported)  Sitting - Dynamic: Good (unsupported); Fair (occasional)  Standing: Impaired  Standing - Static: Constant support;Good    ADL Intervention:  Provided education to patient and family regarding orthostatic hypotension and impact on home safety and fall risk as family currently desires discharge home. Lower Body Dressing Assistance  Socks: Total assistance (dependent)     Upper Body Dressing Assistance  Hospital Gown: Maximum assistance;Verbal cues; Physical assistance (decreased attention / command following w/ significant multimodal cues needed to left / thread UEs). Cognitive Retraining  Orientation Retraining: Reorienting;Time  Safety/Judgement: Awareness of environment; Fall prevention;Home safety    Educated patient on best hand placement for safe functional transfers; very limited carryover despite max cues. Pain:  No reports.     Activity Tolerance:   Fair, Poor, requires frequent rest breaks, and signs and symptoms of orthostatic hypotension    After treatment patient left in no apparent distress:   Supine in bed, Call bell within reach, Caregiver / family present, and Side rails x 3    COMMUNICATION/COLLABORATION:   The patients plan of care was discussed with: Physical therapist and Registered nurse.      Leland Sandra OT  Time Calculation: 24 mins

## 2021-06-24 NOTE — DISCHARGE INSTRUCTIONS
HOSPITALIST DISCHARGE INSTRUCTIONS    NAME: Steffanie Tejeda   :  3/16/1930   MRN:  [id number]     Date/Time:  2021 10:54 AM    ADMIT DATE: 6/15/2021   DISCHARGE DATE: 2021     Choledocholithiasis s/p ERCp and Lap marcie  Atrial fibrillation, chronic  CAD  Dementia    Start taking warfarin and aspirin from 2021      · It is important that you take the medication exactly as they are prescribed. · Keep your medication in the bottles provided by the pharmacist and keep a list of the medication names, dosages, and times to be taken in your wallet. · Do not take other medications without consulting your doctor. What to do at [address]:  Resume previous diet    Recommended activity: Activity as tolerated      If you have questions regarding the hospital related prescriptions or hospital related issues please call [facility] at [phone]. You can always direct your questions to your primary care doctor if you are unable to reach your hospital physician; your PCP works as an extension of your hospital doctor just like your hospital doctor is an extension of your PCP for your time at the hospital [facility])    If you experience any of the following symptoms then please call your primary care physician or return to the emergency room if you cannot get hold of your doctor:    Fever, chills, nausea, vomiting, or persistent diarrhea  Worsening weakness or new problems with your speech or balance  Dark stools or visible blood in your stools  New Leg swelling or shortness of breath as these could be signs of a clot    Additional Instructions:      Bring these papers with you to your follow up appointments. The papers will help your doctors be sure to continue the care plan from the hospital.              Information obtained by :  I understand that if any problems occur once I am at home I am to contact my physician.     I understand and acknowledge receipt of the instructions indicated above.                                                                                                                                            Physician's or R.N.'s Signature                                                                  Date/Time                                                                                                                                              Patient or Representative Signature

## 2021-06-24 NOTE — PROGRESS NOTES
Looks good. Nely diet. CDI. MAXIMO serous. Remove MAXIMO. RTC 3 weeks. Discussed with patient and family.   Nila.

## 2021-06-24 NOTE — PROGRESS NOTES
Problem: Pressure Injury - Risk of  Goal: *Prevention of pressure injury  Description: Document Justin Scale and appropriate interventions in the flowsheet. Outcome: Progressing Towards Goal  Note: Pressure Injury Interventions:  Sensory Interventions: Float heels, Assess changes in LOC    Moisture Interventions: Absorbent underpads, Check for incontinence Q2 hours and as needed, Apply protective barrier, creams and emollients    Activity Interventions: Increase time out of bed    Mobility Interventions: PT/OT evaluation    Nutrition Interventions: Document food/fluid/supplement intake, Offer support with meals,snacks and hydration    Friction and Shear Interventions: Apply protective barrier, creams and emollients, HOB 30 degrees or less                Problem: Patient Education: Go to Patient Education Activity  Goal: Patient/Family Education  Outcome: Progressing Towards Goal     Problem: Falls - Risk of  Goal: *Absence of Falls  Description: Document Denae Fall Risk and appropriate interventions in the flowsheet.   Outcome: Progressing Towards Goal  Note: Fall Risk Interventions:  Mobility Interventions: Bed/chair exit alarm, Patient to call before getting OOB    Mentation Interventions: Bed/chair exit alarm, More frequent rounding, Reorient patient, Toileting rounds    Medication Interventions: Bed/chair exit alarm    Elimination Interventions: Bed/chair exit alarm, Call light in reach, Toileting schedule/hourly rounds    History of Falls Interventions: Bed/chair exit alarm         Problem: Patient Education: Go to Patient Education Activity  Goal: Patient/Family Education  Outcome: Progressing Towards Goal     Problem: Pain  Goal: *Control of Pain  Outcome: Progressing Towards Goal     Problem: Patient Education: Go to Patient Education Activity  Goal: Patient/Family Education  Outcome: Progressing Towards Goal     Problem: Patient Education: Go to Patient Education Activity  Goal: Patient/Family Education  Outcome: Progressing Towards Goal     Problem: Patient Education: Go to Patient Education Activity  Goal: Patient/Family Education  Outcome: Progressing Towards Goal

## 2021-06-24 NOTE — PROGRESS NOTES
Pharmacist Discharge Medication Reconciliation  Encounter Diagnoses   Name Primary? Dehydration     Choledocholithiasis Yes    Elevated liver enzymes     Anorexia     Intractable vomiting with nausea, unspecified vomiting type     Dementia without behavioral disturbance, unspecified dementia type ([facility] [address]          Allergies: Patient has no known allergies. Discharge Medications:   Current Discharge Medication List        CONTINUE these medications which have CHANGED    Details   warfarin (COUMADIN) 5 mg tablet Take 1 Tablet by mouth daily. Start Taking from July 5th 2021  Qty: 30 Tablet, Refills: 0  Start date: 6/24/2021      aspirin 81 mg chewable tablet Take 1 Tablet by mouth daily. Start taking from July 5th 2021  Qty: 30 Tablet, Refills: 0  Start date: 6/24/2021           CONTINUE these medications which have NOT CHANGED    Details   traMADol (ULTRAM) 50 mg tablet Take 0.5 Tabs by mouth every eight (8) hours as needed for Pain. Max Daily Amount: 75 mg. Qty: 15 Tab, Refills: 0      metoprolol succinate (TOPROL-XL) 25 mg XL tablet Take 1 Tab by mouth daily. Qty: 30 Tab, Refills: 3      furosemide (LASIX) 20 mg tablet Take one tablet daily  Qty: 30 Tab, Refills: 3      omeprazole (PRILOSEC) 40 mg capsule Take 1 Cap by mouth daily. Qty: 30 Cap, Refills: 3      nitroglycerin (NITROSTAT) 0.4 mg SL tablet 0.4 mg by SubLINGual route every five (5) minutes as needed for Chest Pain. atorvastatin (LIPITOR) 20 mg tablet Take 20 mg by mouth daily. lisinopril (PRINIVIL, ZESTRIL) 5 mg tablet Take 5 mg by mouth daily. The patient's chart, MAR and AVS were reviewed by Carmen Heard [facility].     Discharging Provider: Cinthya Feng    Thank you,     Carmen Heard, [facility]

## 2021-06-24 NOTE — DISCHARGE SUMMARY
Hospitalist Discharge Summary     Patient ID:  Elias Mack  [id number]  72 y.o.  3/16/1930  6/15/2021    PCP on record: Abner Candelaria MD    Admit date: 6/15/2021  Discharge date and time: 6/24/2021    DISCHARGE DIAGNOSIS:    See below    CONSULTATIONS:  IP CONSULT TO GASTROENTEROLOGY    Excerpted HPI from H&P of Dr Pratima Gaxiola:    by EMS from home. Pool Meyer has 6/10 headache pain x 2 months with increased weakness with no appetite x 1 month        HISTORY OF PRESENT ILLNESS:     Crissy Villalpando a 80 y. o. female, with significant pmhx of hypertension, CVA, CAD, dementia, previous breast cancer, reflux, left upper legs presents via EMS  to the ED with report of \"not having eaten anything for the last month. \"  Family reports that she was seen by her primary care doctor earlier today.  Was able to ambulate minimally with walker assistance at the office today but upon returning home was unable to lift up her leg and off to ambulate for 3 steps into her house. Reji Pruitt family notes that every time that she goes to eat or drink anything she vomits.  Patient also complains of frontal headache without visual disturbance that has been ongoing for several weeks, and family is unsure when her last bowel movement was. Fariba Lewis notes that she has recurrent falls as well.  Other than her headache patient has no complaints.  Patient  specifically denies any associated fevers, chills, CP, SOB,  diarrhea, abd pain, urinary sxs,     Social Hx: denies tobacco  denies EtOH , denies Illicit Drugs     There are no other complaints, changes, or physical findings at this time.      PCP: Vik Sims MD  ______________________________________________________________________  DISCHARGE SUMMARY/HOSPITAL COURSE:  for full details see H&P, daily progress notes, labs, consult notes.        Choledocholithiasis s/p ERCp and Lap marcie  Atrial fibrillation, probably chronic  CAD  Dementia    Patient was admitted for cholelithiasis with CBD dilation, initially she had mild alkaline phos with elevation with normal bilirubin normal WBC, was attempted to get ERCP, initially declined, later she agreed and which showed numerous common hepatic and common bile duct stone with cholelithiasis. Initially she was symptomatic, and initial plan was to manage conservatively with her age. But her LFTs were rising, so she had ERCP done 2 days ago, from 6/22,  had numerous biliary stones, which was removed. A stent was placed and large sphincterectomy was performed. She had lap cholecystectomy yesterday, had a MAXIMO drain which was removed today. She was tolerating diet. Daughter at the bedside advised to hold, Coumadin and aspirin for 10 days, to start from July 5. Advised to follow-up with a GI, she will need eventual stent removal in 2 or 3 months. On day of discharge, when she tried to work with PT, she became orthostatic, suspect from autonomic component, as she has been in the bed for long period of time. Her volume status is normal, has been receiving enough IV fluid. Family at the bedside, advised regarding precaution when she try to get up from the bed, mainly to wait in the sitting position for couple of minutes from lying position, before standing up.               _______________________________________________________________________  Patient seen and examined by me on discharge day. Pertinent Findings:  Gen:    Not in distress  Chest: Clear lungs  CVS:   Regular rhythm. No edema  Abd:  Soft, not distended, not tender  Neuro:  Alert,   _______________________________________________________________________  DISCHARGE MEDICATIONS:   Discharge Medication List as of 6/24/2021  3:43 PM      CONTINUE these medications which have CHANGED    Details   warfarin (COUMADIN) 5 mg tablet Take 1 Tablet by mouth daily.  Start Taking from July 5th 2021, No Print, Disp-30 Tablet, R-0      aspirin 81 mg chewable tablet Take 1 Tablet by mouth daily. Start taking from July 5th 2021, No Print, Disp-30 Tablet, R-0         CONTINUE these medications which have NOT CHANGED    Details   traMADol (ULTRAM) 50 mg tablet Take 0.5 Tabs by mouth every eight (8) hours as needed for Pain. Max Daily Amount: 75 mg., Print, Disp-15 Tab, R-0      metoprolol succinate (TOPROL-XL) 25 mg XL tablet Take 1 Tab by mouth daily. , Print, Disp-30 Tab, R-3      furosemide (LASIX) 20 mg tablet Take one tablet daily, Print, Disp-30 Tab, R-3      omeprazole (PRILOSEC) 40 mg capsule Take 1 Cap by mouth daily. , Print, Disp-30 Cap, R-3      nitroglycerin (NITROSTAT) 0.4 mg SL tablet 0.4 mg by SubLINGual route every five (5) minutes as needed for Chest Pain., Historical Med      atorvastatin (LIPITOR) 20 mg tablet Take 20 mg by mouth daily. , Historical Med      lisinopril (PRINIVIL, ZESTRIL) 5 mg tablet Take 5 mg by mouth daily. , Historical Med               Patient Follow Up Instructions: Activity: Activity as tolerated  Diet: Resume previous diet  Wound Care: None needed        Follow-up Information     Follow up With Specialties Details Why Contact Info    Gardenia Lambert MD Family Medicine Call to schedule PCP appointment for hospital follow-up [address] [place] [address]  [phone]      Torie Ledezma MD Gastroenterology Go on 8/2/2021 at 9:15 AM for hospital follow-up appointment with Gastroenterologist. [address] [facility]  [address]  [phone]      [address] [place] [address] DME Services  This is your home medical supply provider. Please call them directly with any questions about your hospital bed! [address]  [phone]    [facility]  This is the provider for your bedside commode. Please contact them directly with any questions!  [facility],[place] [address]  [phone] ________________________________________________________________    Risk of deterioration: Low    Condition at Discharge:  Stable  __________________________________________________________________    Disposition  Home with family and home health services    ____________________________________________________________________    Code Status: Full Code  ___________________________________________________________________      Total time in minutes spent coordinating this discharge (includes going over instructions, follow-up, prescriptions, and preparing report for sign off to her PCP) :  >30 minutes    Signed:  Leyda Driver MD

## 2021-06-24 NOTE — ADT AUTH CERT NOTES
PA recommendation: INPATIENT by Jatinder Bennett       Review Status Review Entered   In Primary 2021 16:01      Criteria Review     Name: Elisa Mack   : 3/16/1930   [facility]# : [id number]  Insurance: [employer] Medicare     Clinical summary Choledocholithiasis s/p ERCP, cholecystolithiasis s/p cholecystectomy  Vitals   Labs and Imaging   MCG criteria applies YES  Comments Diagnosis of choledocholithiasis meets inpatient criteria      This chart was reviewed at 3:31 PM 2021    Jose Cifuentes MD  Physician [address]   Cell [phone]      Gallbladder or Bile Duct Inflammation or Stone - Care Day 1 (2021) by Jatinder Bennett       Review Status Review Entered   Completed 2021 15:08      Criteria Review      Care Day: 1 Care Date: 2021 Level of Care: Inpatient Floor    Guideline Day 1    Level Of Care    (X) Floor or ICU    2021 15:08:40 EDT by Heike Stewart      surgical floor    Clinical Status    (X) * Clinical Indications met    2021 15:08:40 EDT by Benny Osgood is now s/p ERCP with biliary sphincterotomy, biliary stone removal biliary stent placement, duodenal balloon dilation and biopsy today. Cholangiogram: numerous stones througout entire biliary tree w/resultant severe biliary dilation    (X) Pain, fever, or vomiting    2021 15:08:40 EDT by Dominique Rod Pt was in hosp for vomiting, high LFT.  /44, 84, 98.7, 20, RA sat 94%    Routes    (X) NPO    2021 15:08:40 EDT by Dominique Rod NPO until 4pm, then CLD until MN, and then NPO again for lap marcie tomorrow    (X) IV fluids    2021 15:08:40 EDT by Heike Stewart      NS at 50cc/hr    (X) IV medications    2021 15:08:40 EDT by Heike Stewart      IV kcl 10meq Q1H x3, Pepcid 20mg po qd, toprol xl 25mg po qd    Interventions    (X) Possible ERCP or sphincterotomy    2021 15:08:40 EDT by Heike Stewart      ERCP as documented-numerous stones throughout ent biliary tree w/resultant severe biliary dilation. Balloon sweeks w/removal of mult stones and debris/sludge, 10mm stent x1 sec stone burden. Plan for lap marcie tomorrow    (X) Laboratory tests    6/23/2021 15:08:40 EDT by Lila Santos      INR 1.2, Na 134, K 3.1, tbili 1.3, alb 2.3, glob 4.8, ast 132, alk phos 370    Medications    ( ) Parenteral analgesics    6/23/2021 15:08:40 EDT by Lila Santos      Tylenol 1000mg po Q8H RTC and Q6H PRN x1    * Milestone      Gallbladder or Bile Duct Inflammation or Stone - Clinical Indications for Admission to Inpatient Care by Hayley Ken       Review Status Review Entered   Completed 6/23/2021 14:53      Criteria Review      Clinical Indications for Admission to Inpatient Care    Most Recent : Lila Santos Most Recent Date: 6/23/2021 14:53:59 EDT    (X) Admission is indicated for  1 or more  of the following  (1) (2) (3):       (X) Calculus or obstruction of gallbladder or bile duct and  1 or more  of the following :          (X) Common bile duct obstruction diagnosed (eg, by imaging)          6/23/2021 14:53:59 EDT by Lila Santos            CBD obstruction req ERCP      Vomiting - Care Day 7 (6/21/2021) by Hayley Ken       Review Status Review Entered   Completed 6/22/2021 16:12      Criteria Review      Care Day: 7 Care Date: 6/21/2021 Level of Care: Inpatient Floor    Guideline Day 2    Clinical Status    ( ) * Hemodynamic stability    6/22/2021 16:12:02 EDT by Lila Santos      BP 84/42 at 0819. 101, 98.1, 16, RA sat 95%    (X) * Vomiting absent or controlled    6/22/2021 16:12:02 EDT by Lila Santos      No vomiting. UGI today: abnormal peristalsis of esophagus with hiatal hernia. Sig deformed distal antrum with narrowing.  SUrgical consult-will undergo upper endo/ERCP tomorrow    (X) * Electrolyte abnormalities absent or acceptable for next level of care    6/22/2021 16:12:02 EDT by Darek fowler (X) * Life-threatening causes of vomiting excluded    6/22/2021 16:12:02 EDT by Oink      NA    (X) * Pain absent or managed    6/22/2021 16:12:02 EDT by Oink      no pain    ( ) * Discharge plans and education understood    Activity    (X) * Ambulatory or acceptable for next level of care    6/22/2021 16:12:02 EDT by Oink      as tolerated    Routes    (X) * Oral hydration    6/22/2021 16:12:02 EDT by Oink      PO diet    (X) * Oral medications or regimen acceptable for next level of care    6/22/2021 16:12:02 EDT by Oink      Kcl 40meq po x1, tylenol 1000mg po q8H RTC. Toprol xl held for low bp    ( ) * Oral diet or acceptable for next level of care    6/22/2021 16:12:02 EDT by Oink      CLD. NPO at MN    Interventions    (X) Electrolytes    6/22/2021 16:12:02 EDT by Oink      Na 134, K 3.1, co2 34, ag 3, tbili 1.7, dbili 0.5, alb 2.3, glob 4.9, ast 106, alk phos 229. INR 1.2    * Milestone   Additional Notes   Assessment: (SURGEON)   1.  Cholecystolithiasis and choledocholithiasis with tbil 1.7.       2.  \"Abnormal peristalsis of the esophagus with hiatal hernia. Significant significantly deformed distal antrum with narrowing. \"       Body mass index is 28.67 kg/m².       Comorbid h/o CAD/MI, h/o stroke, HTN, dementia.       S/p no prior abdominal ops.       Plan:   Patient states she does not want to have surgery but daughter who is POA in setting of patient with dementia wants to pursue surgery.     She will undergo upper endoscopy/ERCP tomorrow.     Will not plan to do cholecystectomy at same time.  Given size of stones, may require CBD exploration and would prefer to do that robotically. Will await results of endoscopy.              Assessment / Plan: (HOSPITALIST)       Cholelithiasis    Choledocholithiasis    LFT elevation   Nausea vomiting   Anorexia and weight loss   -CT abdomen   1.  Cholelithiasis, with dilated common bile duct 12 mm. Choledocholithiasis as   well is irregular wall thickening of the distal common bile duct. Cannot exclude   cholangiocarcinoma. 2.  Uterine fibroids.       -MRCP: Cholelithiasis with numerous common hepatic and common bile duct stones. -UGIS pending   -on CLD. -GI input noted.  Recommending to proceed with ERCP as inpatient as her LFTs rising and she is already off coumadin.  INR 1.2.   Discussed with daughter   -PT OT eval and treat.        Atrial fibrillation, probably chronic   CAD   -Holding aspirin    -Lisinopril and Lasix held due to soft blood pressure.    -continue metoprolol    -continue holding coumadin in anticipation of ERCP       Dementia   -mood stable       25.0 - 29.9 Overweight / Body mass index is 28.67 kg/m².       Estimated discharge date: June 23      PHYSICAL EXAM:   General:          WD, WN. Alert, cooperative, no acute distress     EENT:              EOMI. Anicteric sclerae.  MMM   Resp:               CTA bilaterally, no wheezing or rales.  No accessory muscle use   CV:                  Regular  rhythm,  No edema   GI:                   Soft, Non distended, Non tender.  +Bowel sounds   Neurologic:       Alert and oriented X 3, normal speech,    Psych:   Good insight. Not anxious nor agitated   Skin:                No rashes.  No jaundice      Vomiting - Care Day 6 (6/20/2021) by Manny Jaramillo RN       Review Status Review Entered   Completed 6/22/2021 10:39      Criteria Review      Care Day: 6 Care Date: 6/20/2021 Level of Care: Inpatient Floor    Guideline Day 2    Level Of Care    ( ) Floor to discharge    6/22/2021 10:39:34 EDT by Malka WHEELER medical    Clinical Status    (X) * Hemodynamic stability    6/22/2021 10:39:34 EDT by Mira Llanes      98.3 70 125/65 16 96% room air    (X) * Vomiting absent or controlled    (X) * Electrolyte abnormalities absent or acceptable for next level of care    (X) * Life-threatening causes of vomiting excluded    (X) * Pain absent or managed    6/22/2021 10:39:34 EDT by Yanci Rosen      tylenol po for pain    ( ) * Discharge plans and education understood    Activity    (X) * Ambulatory or acceptable for next level of care    6/22/2021 10:39:34 EDT by Yanci Rosen      activity as tolerated w/ assistance    Routes    (X) * Oral hydration    6/22/2021 10:39:34 EDT by Mira Clark      liquid diet    (X) * Oral medications or regimen acceptable for next level of care    6/22/2021 10:39:34 EDT by Mira Rodriguez      Tylenol 650mg po q6 prn x1  Tylenol 1000mg po q8  Toprol 25mg po qday    (X) * Oral diet or acceptable for next level of care    * Milestone   Additional Notes   6/20/2021   LOC IP MEDICAL   VS 98.3 70 125/65 16 96% room air   LABS NO LABS/IMAGING      MEDS       ATTENDING NOTE   Assessment / Plan:       Cholelithiasis    Choledocholithiasis    Nausea vomiting   Anorexia and weight loss   -CT abdomen   1. Cholelithiasis, with dilated common bile duct 12 mm. Choledocholithiasis as   well is irregular wall thickening of the distal common bile duct. Cannot exclude   cholangiocarcinoma. 2.  Uterine fibroids.       -MRCP: Cholelithiasis with numerous common hepatic and common bile duct stones.   -Follow up with Dr Jesusita Navarro as OP for ERCP.     -on FLD.  Advance in AM if stable.  UGIS pending   -PT OT eval and treat.        Atrial fibrillation, probably chronic   CAD   -Holding aspirin    -Lisinopril, Lasix held due to soft blood pressure.    -continue metoprolol    -restart coumadin if no procedures planned       Dementia       25.0 - 29.9 Overweight / Body mass index is 28.67 kg/m².       Estimated discharge date: June 18   Barriers:       Code status: Full   Prophylaxis: SCD's   Recommended Disposition:  PT, OT, RN       Subjective:       Chief Complaint / Reason for Physician Visit   Follow up of gallstones, anorexia, dilated common bile duct, A. fib   Chart reviewed in detail.  Discussed with RN events overnight. PHYSICAL EXAM:   General:          WD, WN. Alert, cooperative, no acute distress     EENT:              EOMI. Anicteric sclerae. MMM   Resp:               CTA bilaterally, no wheezing or rales.  No accessory muscle use   CV:                  Regular  rhythm,  No edema   GI:                   Soft, Non distended, Non tender.  +Bowel sounds   Neurologic:       Alert and oriented X 3, normal speech,    Psych:   Good insight.  Not anxious nor agitated   Skin:                No rashes.  No jaundice         Vomiting - Care Day 5 (6/19/2021) by Arlet Blackmon RN       Review Status Review Entered   Completed 6/22/2021 10:34      Criteria Review      Care Day: 5 Care Date: 6/19/2021 Level of Care: Inpatient Floor    Guideline Day 2    Level Of Care    ( ) Floor to discharge    6/22/2021 10:34:13 EDT by Ede WHEELER medical    Clinical Status    (X) * Hemodynamic stability    6/22/2021 10:34:13 EDT by Mira Francois      98.4 70 108/62 20 95% ROOM AIR    (X) * Vomiting absent or controlled    ( ) * Electrolyte abnormalities absent or acceptable for next level of care    (X) * Life-threatening causes of vomiting excluded    (X) * Pain absent or managed    ( ) * Discharge plans and education understood    Activity    (X) * Ambulatory or acceptable for next level of care    6/22/2021 10:34:13 EDT by Ede Mack      activity as tolerated w/ assistance    Routes    (X) * Oral hydration    6/22/2021 10:34:13 EDT by Ede Mack      full liquid diet    (X) * Oral medications or regimen acceptable for next level of care    6/22/2021 10:34:13 EDT by Ede Mack      Tylenol 1000mg po q8  Toprol 25mg po qday    (X) * Oral diet or acceptable for next level of care    * Milestone   Additional Notes   6/19/2021   LOC IP MEDICAL   VS 98.4 70 108/62 20 95% ROOM AIR   LABS NO LABS/IMAGING      MEDS Tylenol 1000mg po q8   Toprol 25mg po qday      ATTENDING NOTE   Assessment / Plan:       Cholelithiasis    Choledocholithiasis    Nausea vomiting   Anorexia and weight loss   -CT abdomen   1. Cholelithiasis, with dilated common bile duct 12 mm. Choledocholithiasis as   well is irregular wall thickening of the distal common bile duct. Cannot exclude   cholangiocarcinoma. 2.  Uterine fibroids.       -MRCP: Cholelithiasis with numerous common hepatic and common bile duct stones. -UGIS pending   -Follow up with Dr Desirae Jaquez as OP for ERCP.     -advance diet to full liquids.  No reported vomiting overnight and this AM.     -PT OT eval and treat.        Atrial fibrillation, probably chronic   CAD   -Holding aspirin and Coumadin in anticipation of possible need for procedures   -Lisinopril, Lasix held due to soft blood pressure.    -restarted metoprolol       Dementia       25.0 - 29.9 Overweight / Body mass index is 28.67 kg/m².       Estimated discharge date: June 18   Barriers:       Code status: Full   Prophylaxis: SCD's   Recommended Disposition:  PT, OT, RN           Subjective:       Chief Complaint / Reason for Physician Visit   Follow up of gallstones, anorexia, dilated common bile duct, A. fib   Chart reviewed in detail.  Discussed with RN events overnight. PHYSICAL EXAM:   General:          WD, WN. Alert, cooperative, no acute distress     EENT:              EOMI. Anicteric sclerae. MMM   Resp:               CTA bilaterally, no wheezing or rales.  No accessory muscle use   CV:                  Regular  rhythm,  No edema   GI:                   Soft, Non distended, Non tender.  +Bowel sounds   Neurologic:       Alert and oriented X 3, normal speech,    Psych:   Good insight.  Not anxious nor agitated   Skin:                No rashes.  No jaundice

## 2021-06-24 NOTE — PROGRESS NOTES
End of Shift Note    Bedside shift change report given to Adam Chappell RN (oncoming nurse) by Beka Zuñiga RN (offgoing nurse). Report included the following information SBAR, Kardex, Intake/Output, MAR and Recent Results    Shift worked:  7p-7a     Shift summary and any significant changes:    Patient tolerating clear liquids. Hydrocodone offered for pain but patient stated she \"don't need nothing strong\" and refused it. Received scheduled Tylenol and states was \"helpful. \" Right quad MAXIMO drain milked every 4 hrs. Had small BM this morning. Concerns for physician to address:  N/A     Zone phone for oncoming shift:  [phone]       Activity:  Activity Level: Bed Rest  Number times ambulated in hallways past shift: 0  Number of times OOB to chair past shift: 0    Cardiac:   Cardiac Monitoring: No      Cardiac Rhythm: Atrial Fib    Access:   Current line(s): PIV     Genitourinary:   Urinary status: voiding and incontinent    Respiratory:   O2 Device: None (Room air)  Chronic home O2 use?: NO  Incentive spirometer at bedside: YES  Actual Volume (ml): 300 ml  GI:  Last Bowel Movement Date: 06/22/21  Current diet:  ADULT ORAL NUTRITION SUPPLEMENT Breakfast, Lunch, Dinner; Clear Liquid  ADULT DIET Clear Liquid  Passing flatus: YES  Tolerating current diet: YES       Pain Management:   Patient states pain is manageable on current regimen: YES    Skin:  Justin Score: 16  Interventions: increase time out of bed    Patient Safety:  Fall Score:  Total Score: 4  Interventions: bed/chair alarm, gripper socks and pt to call before getting OOB  High Fall Risk: Yes    Length of Stay:  Expected LOS: 3d 0h  Actual LOS: Bryant Chaparro RN

## 2021-06-24 NOTE — PROGRESS NOTES
Transition of Care Plan:     RUR: 16% LOW  Disposition: Home w/ out-pt services; lives w/ family  >Pt will need order/ Rx for out-pt PT/OT! Follow up appointments: PCP, GI- details in AVS  DME needed: Bedside commode (provided by [facility]), hospital bed (provided by [facility])  Transportation at [facility] 4 PM  [place] or means to access home:  No  IM Medicare letter: Reviewed w/ dtr/ MPOA on 6/24. Caregiver Contact: Dtr/ MPOA- Anjel Bryant ([phone])  2ndDenjanis Dow ([phone])  Discharge Caregiver contacted prior to discharge? Yes- spoke w/ Kristen Houser via phone. CM acknowledged d/c.      Update 2:58 PM  CM sent referrals via [employer] for a bedside commode ([employer]) & semi-electric hospital bed [facility]). [address] [place] [address] confirmed they can provide pt w/ a semi-electric hospital bed & deliver to pt's home tomorrow (6/25). CM confirmed w/ attending MD that pt is medically appropriate to d/c home today & await delivery of hospital bed for tomorrow 6/25. CM received update from [facility] they are able to provide pt w/ a bsc & will deliver to the pt's home. DME delivery will be coordinated w/ pt's dtr Yani Li to schedule time for tomorrow (6/25). CM relayed update to pt's dtr Kristen Houser; she verbalized understanding & presented no addtl questions or concerns at this time. 2nd IMM reviewed w/ pt's dtr/ [address] [place] [address] d/t pt's hx of dementia. Medicare pt has received, reviewed, and signed 2nd IM letter informing them of their right to appeal the discharge. Signed copy has been placed on pt bedside chart. Pt ready for d/c from CM standpoint. AVS needs to be reprinted. Update 1:42 PM  CM reviewed pt's chart & noted OT's recommendation for DME: bedside commode, hospital bed, and wheelchair.  CM relayed update to pt's dtr Yani Li ([phone]) who reported pt has a W/C (delivered 6/23 to pt's home) but requested for CM to order a hospital bed & bsc. CM revisited the need for pt to receive rehab; pt's dtr reported she will contact the pt's PCP \"to get the people to do rehab at home. \" CM confirmed w/ pt's dtr that she will secure HH through pt's PCP; dtr confirmed. CM actively working to secure needed DME prior to d/c home. Will continue to follow. Update 12:20 PM  CM met w/ pt & her family at bedside (dtr- Nguyen Guille, granddtr, & friend). CM reviewed TERI plan w/ pt's dtr Nguyen Han. Pt's dtr verbalized understanding. CM also spoke w/ pt's dtr/ [address] [place] [address] ([phone]) via phone to review [facility] plan for d/c re: home w/ out-pt services & f/u appts. Carey Bravo verbalized understanding & is agreeable to d/c. CM confirmed w/ pt's dtr that she is declining SNF placement at this time, despite recommendation from interdisciplinary team. Carey Bravo stated \"no rehab\" & reported she can support her mother in the home w/ support from family. CM attempted to secure PCP f/u appt; physician's office stated they will call CM directly to schedule appt. If pt discharges before call back, CM to contact pt/ family directly w/ date & time. Carey Bravo reported she will be available to transport pt later this afternoon as her  is currently using their car. Pt's dtr requesting medical update form surgeon or attending MD; CM notified attending MD via perfect serve. CM notified RN that pt will need Rx for out-pt PT/OT prior to d/c. Pt's dtr reported no addtl concerns or questions for CM at this time. CM available for any further needs, will continue to monitor plan for dispo. Update 11:26 AM  CM actively working on d/c plan. Will notify RN when pt clear from CM standpoint. Update 10:55 AM  CM discussed d/c barrier w/ securing HH during IDR. FIORDALIZA Calderon reported pt is appropriate for out-pt therapy if family is agreeable to transport pt. CM contacted pt's dtr Mac Vega ([phone]) to relay update & discuss d/c plan.  Carey Bravo verbalized understanding & is agreeable to out-pt rehab. Pt will need order for out-pt rehab prior to d/c. Will continue to follow. Initial note-  CM reviewed pt's chart. CM actively working to [place] [address] for d/c per family's request. CM sent multiple referrals via [employer] but are all unable to service area or accept pt's payor source at this time. The following referrals were sent to: [facility]- Unable to accept d/t insurance  [facility]- Unable to accept d/t insurance  [facility] [address] to accept d/t insurance  [address] [facility] ([facility])- Unable to accept d/t insurance  [facility])- At [address] for payor source  [place] at [facility] to service pt at this time  [facility] to accept d/t insurance  [facility] to accept d/t insurance  [address] to accept d/t insurance  [facility]- Does not service area    Will continue to follow & report updates. Care Management Interventions  PCP Verified by CM: Yes  Palliative Care Criteria Met (RRAT>21 & CHF Dx)?: Yes  Palliative Consult Recommended?: Yes (consult placed 6/17)  Mode of Transport at Discharge:  Other (see comment) (Family)  Hospital Transport Time of Discharge: 1600  Transition of Care Consult (CM Consult): Discharge Planning  Discharge Durable Medical Equipment: Yes (bsc & hospital bed)  Physical Therapy Consult: Yes  Occupational Therapy Consult: Yes  Current Support Network: Family Lives [place], Relative's Home  Confirm Follow Up Transport: Family  The Plan for Transition of Care is Related to the Following Treatment Goals : Home w/ out-pt f/u  The Patient and/or Patient Representative was Provided with a Choice of Provider and Agrees with the Discharge Plan?: Yes  Name of the Patient Representative Who was Provided with a Choice of Provider and Agrees with the Discharge Plan: Mac Barney  Discharge Location  Discharge Placement: Home with outpatient services (& f/u appts)        Bryan Gonzalez, [place] [facility]  Care Management

## 2021-06-24 NOTE — PROGRESS NOTES
Problem: Mobility Impaired (Adult and Pediatric)  Goal: *Acute Goals and Plan of Care (Insert Text)  Description: FUNCTIONAL STATUS PRIOR TO ADMISSION: The patient is a poor historian. Patient was modified independent using a walker for functional mobility. HOME SUPPORT PRIOR TO ADMISSION: The patient lived with daughter who assists her. Physical Therapy Goals  Initiated 6/18/2021  1. Patient will move from supine to sit and sit to supine  in bed with modified independence within 7 day(s). 2.  Patient will transfer from bed to chair and chair to bed with minimal assistance using the least restrictive device within 7 day(s). 3.  Patient will perform sit to stand with modified independence within 7 day(s). 4.  Patient will ambulate with minimal assistance for 50 feet with the least restrictive device within 7 day(s). Physical Therapy Goals  Udated on re-eval 6/24/2021  1. Patient will move from supine to sit and sit to supine  in bed with min to CGA within 7 day(s). 2.  Patient will transfer from bed to chair and chair to bed with minimal assistance using the least restrictive device within 7 day(s). 3.  Patient will perform sit to stand with min to CGA within 7 day(s). 4.  Patient will ambulate with minimal assistance for 50 feet with the least restrictive device within 7 day(s). Outcome: Not Progressing Towards Goal   PHYSICAL THERAPY RE-EVALUATION  Patient: Oliverio Warner (13 y.o. female)  Date: 6/24/2021  Diagnosis: Nausea & vomiting [R11.2]  Anorexia [R63.0]  Cholelithiases [K80.20]  NADIA (acute kidney injury) ([facility] [address] [N17.9]  Hypokalemia [E87.6]  Gallstones [K80.20]  Choledocholithiasis [K80.50] <principal problem not specified>  Procedure(s) (LRB):  CHOLECYSTECTOMY LAPAROSCOPIC WITH GRAMS (N/A) 1 Day Post-Op  Precautions: Fall  Chart, physical therapy assessment, plan of care and goals were reviewed.     ASSESSMENT  Patient continues with skilled PT services and is not progressing towards goals. Pt still very orthostatic with limited tolerance to activity. She is mod A of 2 to come to EOB. Min A of 2 to scoot. She c/o dizziness in sitting but BP remained stable with increased HR. Pt able to come to stand with min A of 2 but maintained less than 30 sec and was unable to remain standing for full BP measurement. Post completion of measurement in sitting, BP dropped 28 points systolically. Partial recovery with sitting EOB 3-4 min but pt needing to lie back down for full recovery to baseline. Pt requiring A of 2 for repositioning in bed. Multiple family members present observing level of function. Still recommend SNF rehab stay prior to return home due to amount of A needed with 2 persons and limited tolerance to activity. Vitals:  Supine: BP  113/57, HR 65  Sitting EOB: /56, HR 95  Sitting, s/p stand for <30 seconds (unable to obtain standing pressure): BP 85/69 HR 84  Sitting EOB: /80 post 3-4 min HR on dynamap in 140s, manually 80s but difficult to accurately palpate due to irregularity  Sitting EOB, s/p several minutes rest: /67    Current Level of Function Impacting Discharge (mobility/balance): Overall min to mod A of 2, orthostatic    Other factors to consider for discharge: orthostatic, fall risk, decreased activity tolerance, Not able to yet progress to amb         PLAN :  Recommendations and Planned Interventions: bed mobility training, transfer training, gait training, therapeutic exercises, patient and family training/education and therapeutic activities      Frequency/Duration: Patient will be followed by physical therapy:  4 times a week to address goals.     Recommendation for discharge: (in order for the patient to meet his/her long term goals)  Therapy up to 5 days/week in SNF setting    This discharge recommendation:  Has been made in collaboration with the attending provider and/or case management    IF patient discharges home will need the following DME: rolling walker and wheelchair       SUBJECTIVE:   Patient stated I feel weak.     Past Medical History:   Diagnosis Date    Acid reflux     Arthritis     CAD (coronary artery disease)     MI, no cardiologist at this time    Cancer [facility])     breast cancer    Dementia ([facility] [address]     Hypertension     Stroke ([facility] [address]     approx 15 years prior per daughter (2000)     Past Surgical History:   Procedure Laterality Date    HX ERCP  06/2021    HX GYN      x6 vaginal births    LA BREAST SURGERY PROCEDURE UNLISTED      right breast removal due to cancer    LA CARDIAC SURG PROCEDURE UNLIST      x1 stent placed     Hospital course since last seen and reason for reevaluation: pt with lap/cholecytetomy yesterday    OBJECTIVE DATA SUMMARY:   Critical Behavior:  Neurologic State: Alert  Orientation Level: Oriented to person, Disoriented to time  Cognition: Follows commands, Decreased attention/concentration  Safety/Judgement: Awareness of environment, Fall prevention, Home safety  Functional Mobility Training:  Bed Mobility:     Supine to Sit: Minimum assistance;Assist x2; Additional time  Sit to Supine: Moderate assistance;Assist x2  Scooting: Minimum assistance;Assist x1        Transfers:  Sit to Stand: Minimum assistance;Assist x2  Stand to Sit: Minimum assistance                             Balance:  Sitting - Static: Good (unsupported)  Sitting - Dynamic: Good (unsupported); Fair (occasional)  Standing: Impaired  Standing - Static: Constant support;Good  Standing - Dynamic : Not tested    Pain Rating:  No c/o    Activity Tolerance:   Poor    After treatment patient left in no apparent distress:   Supine in bed, Call bell within reach, Bed / chair alarm activated, Caregiver / family present, and Side rails x 3    COMMUNICATION/COLLABORATION:   The patients plan of care was discussed with: Registered nurse and Case management.      Leidy Crooks, PT   Time Calculation: 24 mins

## 2021-06-24 NOTE — PROGRESS NOTES
DISCHARGE NOTE FROM [facility] NURSE    Patient determined to be stable for discharge by attending provider. I have reviewed the discharge instructions and follow-up appointments with the patient and caregiver. They verbalized understanding and all questions were answered to their satisfaction. No complaints or further questions were expressed. No new medication scripts. Appropriate educational materials and medication side effect teaching were provided. PIV and MAXIMO drain were removed prior to discharge. Patient did not discharge with any line, escalante, or drain. All personal items collected during admission were returned to the patient prior to discharge. Post-op patient: Yes- Patient given post-op discharge kit and instructed on use. Pt left via wheelchair to mainlobby. Pt left with all belongings and discharge paperwork.      Anurag Lu RN

## 2021-06-27 LAB
Lab: NORMAL
METHYLMALONATE SERPL-SCNC: 92 NMOL/L (ref 0–378)

## 2022-03-18 PROBLEM — R63.0 ANOREXIA: Status: ACTIVE | Noted: 2021-06-15

## 2022-03-19 PROBLEM — R11.2 NAUSEA & VOMITING: Status: ACTIVE | Noted: 2021-06-15

## 2022-03-19 PROBLEM — E87.6 HYPOKALEMIA: Status: ACTIVE | Noted: 2021-06-15

## 2022-03-19 PROBLEM — K80.20 GALLSTONES: Status: ACTIVE | Noted: 2021-06-23

## 2022-03-19 PROBLEM — K80.50 CHOLEDOCHOLITHIASIS: Status: ACTIVE | Noted: 2021-06-24

## 2022-03-19 PROBLEM — N17.9 AKI (ACUTE KIDNEY INJURY) (HCC): Status: ACTIVE | Noted: 2021-06-15

## 2022-03-20 PROBLEM — K80.20 CHOLELITHIASES: Status: ACTIVE | Noted: 2021-06-15

## 2023-05-20 RX ORDER — TRAMADOL HYDROCHLORIDE 50 MG/1
25 TABLET ORAL EVERY 8 HOURS PRN
COMMUNITY
Start: 2016-04-26

## 2023-05-20 RX ORDER — METOPROLOL SUCCINATE 25 MG/1
25 TABLET, EXTENDED RELEASE ORAL DAILY
COMMUNITY
Start: 2015-04-04

## 2023-05-20 RX ORDER — NITROGLYCERIN 0.4 MG/1
0.4 TABLET SUBLINGUAL
COMMUNITY

## 2023-05-20 RX ORDER — ATORVASTATIN CALCIUM 20 MG/1
20 TABLET, FILM COATED ORAL DAILY
COMMUNITY

## 2023-05-20 RX ORDER — FUROSEMIDE 20 MG/1
1 TABLET ORAL DAILY
COMMUNITY
Start: 2015-04-04

## 2023-05-20 RX ORDER — ASPIRIN 81 MG/1
81 TABLET, CHEWABLE ORAL DAILY
COMMUNITY
Start: 2021-06-24

## 2023-05-20 RX ORDER — WARFARIN SODIUM 5 MG/1
5 TABLET ORAL DAILY
COMMUNITY
Start: 2021-06-24

## 2023-05-20 RX ORDER — OMEPRAZOLE 40 MG/1
40 CAPSULE, DELAYED RELEASE ORAL DAILY
COMMUNITY
Start: 2015-04-04

## 2023-05-20 RX ORDER — LISINOPRIL 5 MG/1
5 TABLET ORAL DAILY
COMMUNITY

## 2024-05-02 NOTE — H&P
Hospitalist Admission Note    NAME: Aby Ray   :  3/16/1930   MRN:  [id number]     Date/Time:  6/15/2021 11:20 PM    Patient PCP: Karlie Brody MD  _____________________________________________________________________  Given the patient's current clinical presentation, I have a high level of concern for decompensation if discharged from the emergency department. Complex decision making was performed, which includes reviewing the patient's available past medical records, laboratory results, and x-ray films. My assessment of this patient's clinical condition and my plan of care is as follows. Assessment / Plan:    Nausea and vomiting  Failure to thrive  Anorexia  Headache  Cholelithiasis to rule out cholangiocarcinoma  Acute kidney injury  Hypokalemia  Lactic acidosis 2.20  CT head no acute pathology chest x-ray negative EKG A. fib with heart rate 66 no acute ischemic injury pattern  CT abdomen  1. Cholelithiasis, with dilated common bile duct 12 mm. Choledocholithiasis as  well is irregular wall thickening of the distal common bile duct. Cannot exclude  cholangiocarcinoma. 2.  Uterine fibroids. .  Admit to hospitalist service, n.p.o., GI consulted, IV PPI, patient will need MRCP to rule out cholangiocarcinoma, potassium repleted, will give gentle IV fluids as patient is on IV Lasix at home and will watch for fluid overload, repeat creatinine in the morning,    afib  Check INR  -rate controlled for now; hold coumadin tonight  CAD  -cont ASA  -We will hold home medication lisinopril and metoprolol for now secondary to soft blood pressure will    Per her daughter at bedside she is history of dementia and she is alert oriented to person only to her best and currently she is at her baseline mental status.     Code Status: Full code confirmed with the daughter at bedside  Surrogate Decision Maker:  Elliot [id number] Christopher [phone]     DVT Prophylaxis: Hold anticoagulation pending INR  GI Prophylaxis: not indicated    Baseline: She lives with her daughter        Subjective:   CHIEF COMPLAINT:  Fatigue/Headach  by EMS from home. She has 6/10 headache pain x 2 months with increased weakness with no appetite x 1 month      HISTORY OF PRESENT ILLNESS:     Nereida Thompson is a 80 y.o. female, with significant pmhx of hypertension, CVA, CAD, dementia, previous breast cancer, reflux, left upper legs presents via EMS  to the ED with report of \"not having eaten anything for the last month. \"  Family reports that she was seen by her primary care doctor earlier today. Was able to ambulate minimally with walker assistance at the office today but upon returning home was unable to lift up her leg and off to ambulate for 3 steps into her house. Patient family notes that every time that she goes to eat or drink anything she vomits. Patient also complains of frontal headache without visual disturbance that has been ongoing for several weeks, and family is unsure when her last bowel movement was. Family notes that she has recurrent falls as well. Other than her headache patient has no complaints. Patient  specifically denies any associated fevers, chills, CP, SOB,  diarrhea, abd pain, urinary sxs,     Social Hx: denies tobacco  denies EtOH , denies Illicit Drugs     There are no other complaints, changes, or physical findings at this time. PCP: Gareth Ramsay MD        We were asked to admit for work up and evaluation of the above problems.      Past Medical History:   Diagnosis Date    Acid reflux     Arthritis     CAD (coronary artery disease)     MI, no cardiologist at this time    Cancer [facility])     breast cancer    Dementia ([address]     Hypertension     Stroke ([address]     approx 15 years prior per daughter (2000)        Past Surgical History:   Procedure Laterality Date    HX GYN      x6 vaginal births    [place] BREAST SURGERY PROCEDURE UNLISTED      right breast removal due to cancer    [place] CARDIAC SURG PROCEDURE UNLIST      x1 stent placed       Social History     Tobacco Use    Smoking status: Never Smoker    Smokeless tobacco: Never Used   Substance Use Topics    Alcohol use: No        History reviewed. No pertinent family history. No Known Allergies     Prior to Admission medications    Medication Sig Start Date End Date Taking? Authorizing Provider   traMADol (ULTRAM) 50 mg tablet Take 0.5 Tabs by mouth every eight (8) hours as needed for Pain. Max Daily Amount: 75 mg. 4/26/16   Yamilex Reyna MD   metoprolol succinate (TOPROL-XL) 25 mg XL tablet Take 1 Tab by mouth daily. 4/4/15   Enedelia Malagon MD   furosemide (LASIX) 20 mg tablet Take one tablet daily 4/4/15   Akash Hubbard MD   omeprazole (PRILOSEC) 40 mg capsule Take 1 Cap by mouth daily. 4/4/15   Enedelia Malagon MD   nitroglycerin (NITROSTAT) 0.4 mg SL tablet 0.4 mg by SubLINGual route every five (5) minutes as needed for Chest Pain. Karishma Mcgill MD   warfarin (COUMADIN) 5 mg tablet Take 5 mg by mouth daily. Karishma Mcgill MD   atorvastatin (LIPITOR) 20 mg tablet Take 20 mg by mouth daily. Karishma Mcgill MD   lisinopril (PRINIVIL, ZESTRIL) 5 mg tablet Take 5 mg by mouth daily. Karishma Mcgill MD   aspirin 81 mg chewable tablet Take 81 mg by mouth daily. Karishma Mcgill MD       REVIEW OF SYSTEMS:     I am not able to complete the review of systems because: The patient is intubated and sedated    The patient has altered mental status due to his acute medical problems    The patient has baseline aphasia from prior stroke(s)    The patient has baseline dementia and is not reliable historian    The patient is in acute medical distress and unable to provide information         Constitutional: Negative. Negative for fever. Eyes: Negative. Respiratory: Negative. Negative for shortness of breath. Cardiovascular: Negative for chest pain.    Gastrointestinal: Positive for nausea and vomiting. Negative for abdominal pain. Endocrine: Negative. Genitourinary: Negative. Negative for difficulty urinating, dysuria and hematuria. Musculoskeletal: Negative. Skin: Negative. Neurological: Positive for headaches. Psychiatric/Behavioral: Negative for suicidal ideas. All other systems reviewed and are negative. Objective:   VITALS:    Visit Vitals  /68   Pulse 77   Temp 97.7 °F (36.5 °C)   Resp 16   Ht 5' 4\" (1.626 m)   Wt 75.8 kg (167 lb)   SpO2 98%   BMI 28.67 kg/m²       PHYSICAL EXAM:    Constitutional:       General: She is not in acute distress. Appearance: She is well-developed. She is not diaphoretic. HENT:      Head: Normocephalic and atraumatic. Nose: Nose normal.   Eyes:      General: No scleral icterus. Conjunctiva/sclera: Conjunctivae normal.   Neck:      Trachea: No tracheal deviation. Cardiovascular:      Rate and Rhythm: Normal rate and regular rhythm. Heart sounds: Normal heart sounds. No murmur heard. No friction rub. Pulmonary:      Effort: Pulmonary effort is normal. No respiratory distress. Breath sounds: Normal breath sounds. No stridor. No wheezing or rales. Abdominal:      General: Bowel sounds are normal. There is no distension. Palpations: Abdomen is soft. Tenderness: There is no abdominal tenderness. There is no rebound. Musculoskeletal:         General: No tenderness. Normal range of motion. Cervical back: Normal range of motion. Skin:     General: Skin is warm and dry. Findings: No rash. Neurological:      Mental Status: She is alert and oriented to person, place, and time. Cranial Nerves: No cranial nerve deficit. Psychiatric:         Speech: Speech normal.         Behavior: Behavior normal.         Thought Content:  Thought content normal.         Judgment: Judgment normal.     _______________________________________________________________________  Care Plan discussed with:    Comments   Patient y    Family      RN y    Care Manager                    Consultant:  Gilberto kruse    _______________________________________________________________________  Expected  Disposition:   Home with Family x   HH/PT/OT/RN    SNF/LTC    [facility]    ________________________________________________________________________  TOTAL TIME:   72  Minutes    Critical Care Provided     Minutes non procedure based      Comments    x Reviewed previous records   >50% of visit spent in counseling and coordination of care x Discussion with patient and/or family and questions answered       Given the patient's current clinical presentation, I have a high level of concern for decompensation if discharged from the ED. Complex decision making was performed which includes reviewing the patient's available past medical records, laboratory results, and Xray films. I have also directly communicated my plan and discussed this case with the involved ED physician.     ____________________________________________________________________  Chelsie Chavez MD    Procedures: see electronic medical records for all procedures/Xrays and details which were not copied into this note but were reviewed prior to creation of Plan. LAB DATA REVIEWED:    Recent Results (from the past 24 hour(s))   CBC WITH AUTOMATED DIFF    Collection Time: 06/15/21  6:42 PM   Result Value Ref Range    WBC 6.4 3.6 - 11.0 K/uL    RBC 4.08 3.80 - 5.20 M/uL    HGB 13.7 11.5 - 16.0 g/dL    HCT 41.0 35.0 - 47.0 %    .5 (H) 80.0 - 99.0 FL    MCH 33.6 26.0 - 34.0 PG    MCHC 33.4 30.0 - 36.5 g/dL    RDW 14.2 11.5 - 14.5 %    PLATELET 063 937 - 413 K/uL    MPV 11.1 8.9 - 12.9 FL    NRBC 0.5 (H) 0  WBC    ABSOLUTE NRBC 0.03 (H) 0.00 - 0.01 K/uL    NEUTROPHILS 66 32 - 75 %    LYMPHOCYTES 25 12 - 49 %    MONOCYTES 8 5 - 13 %    EOSINOPHILS 1 0 - 7 %    BASOPHILS 0 0 - 1 %    IMMATURE GRANULOCYTES 0 0.0 - 0.5 %    ABS.  NEUTROPHILS 4.1 1.8 - 8.0 K/UL    ABS. LYMPHOCYTES 1.6 0.8 - 3.5 K/UL    ABS. MONOCYTES 0.5 0.0 - 1.0 K/UL    ABS. EOSINOPHILS 0.1 0.0 - 0.4 K/UL    ABS. BASOPHILS 0.0 0.0 - 0.1 K/UL    ABS. IMM. GRANS. 0.0 0.00 - 0.04 K/UL    DF AUTOMATED     METABOLIC PANEL, COMPREHENSIVE    Collection Time: 06/15/21  6:42 PM   Result Value Ref Range    Sodium 133 (L) 136 - 145 mmol/L    Potassium 3.3 (L) 3.5 - 5.1 mmol/L    Chloride 88 (L) 97 - 108 mmol/L    CO2 41 (HH) 21 - 32 mmol/L    Anion gap 4 (L) 5 - 15 mmol/L    Glucose 137 (H) 65 - 100 mg/dL    BUN 21 (H) 6 - 20 MG/DL    Creatinine 1.32 (H) 0.55 - 1.02 MG/DL    BUN/Creatinine ratio 16 12 - 20      GFR est AA 46 (L) >60 ml/min/1.73m2    GFR est non-AA 38 (L) >60 ml/min/1.73m2    Calcium 9.5 8.5 - 10.1 MG/DL    Bilirubin, total 0.9 0.2 - 1.0 MG/DL    ALT (SGPT) 73 12 - 78 U/L    AST (SGOT) 154 (H) 15 - 37 U/L    Alk. phosphatase 218 (H) 45 - 117 U/L    Protein, total 8.9 (H) 6.4 - 8.2 g/dL    Albumin 3.1 (L) 3.5 - 5.0 g/dL    Globulin 5.8 (H) 2.0 - 4.0 g/dL    A-G Ratio 0.5 (L) 1.1 - 2.2     EKG, 12 LEAD, INITIAL    Collection Time: 06/15/21  9:20 PM   Result Value Ref Range    Ventricular Rate 66 BPM    Atrial Rate 68 BPM    QRS Duration 90 ms    Q-T Interval 434 ms    QTC Calculation (Bezet) 454 ms    Calculated R Axis -26 degrees    Calculated T Axis 137 degrees    Diagnosis       Atrial fibrillation  Septal infarct (cited on or before 15-BERT-2021)  T wave abnormality, consider lateral ischemia  When compared with ECG of 20-DEC-2018 14:55,  Vent.  rate has decreased BY  36 BPM  Incomplete right bundle branch block is no longer present  Questionable change in initial forces of Septal leads     POC TROPONIN-I    Collection Time: 06/15/21  9:21 PM   Result Value Ref Range    Troponin-I (POC) <0.04 0.00 - 0.08 ng/mL   POC LACTIC ACID    Collection Time: 06/15/21  9:30 PM   Result Value Ref Range    Lactic Acid (POC) 2.20 (HH) 0.40 - 2.00 mmol/L Patient

## (undated) DEVICE — BITE BLK ENDOSCP AD 54FR GRN POLYETH ENDOSCP W STRP SLD

## (undated) DEVICE — SOL INJ SOD CL 0.9% 500ML BG --

## (undated) DEVICE — SUTURE ETHLN SZ 2-0 L18IN NONABSORBABLE BLK L19MM PS-2 PRIM 593H

## (undated) DEVICE — Z DISCONTINUED NO SUB IDED SET EXTN W/ 4 W STPCOCK M SPIN LOK 36IN

## (undated) DEVICE — CLICKLINE SCISSORS INSERT: Brand: CLICKLINE

## (undated) DEVICE — ESOPHAGEAL/PYLORIC/COLONIC/BILIARY WIREGUIDED BALLOON DILATATION CATHETER: Brand: CRE™ PRO

## (undated) DEVICE — NEEDLE HYPO 22GA L1.5IN BLK S STL HUB POLYPR SHLD REG BVL

## (undated) DEVICE — GARMENT,MEDLINE,DVT,INT,CALF,MED, GEN2: Brand: MEDLINE

## (undated) DEVICE — NDL PRT INJ NSAF BLNT 18GX1.5 --

## (undated) DEVICE — KIT CHOLGM POLYUR W/ KARLAN BLLN CATH 4FR L60CM 5MM INTRO

## (undated) DEVICE — SYR 10ML LUER LOK 1/5ML GRAD --

## (undated) DEVICE — Device

## (undated) DEVICE — LAPAROSCOPIC TROCAR SLEEVE/SINGLE USE: Brand: KII® OPTICAL ACCESS SYSTEM

## (undated) DEVICE — RETRIEVAL BALLOON CATHETER: Brand: EXTRACTOR™ PRO RX

## (undated) DEVICE — TISSUE RETRIEVAL SYSTEM: Brand: INZII RETRIEVAL SYSTEM

## (undated) DEVICE — STRAP,POSITIONING,KNEE/BODY,FOAM,4X60": Brand: MEDLINE

## (undated) DEVICE — KIT,1200CC CANISTER,3/16"X6' TUBING: Brand: MEDLINE INDUSTRIES, INC.

## (undated) DEVICE — Device: Brand: ENDO SMARTCAP

## (undated) DEVICE — STERILE POLYISOPRENE POWDER-FREE SURGICAL GLOVES: Brand: PROTEXIS

## (undated) DEVICE — ELECTRODE ES 36CM LAP FLAT L HK COAT DISP CLEANCOAT

## (undated) DEVICE — FALCON® SAMPLE CONTAINER, WITH LID, 4.5OZ (110ML), INDIVIDUALLY WRAPPED, STERILE, 100/CASE: Brand: FALCON A CORNING BRAND

## (undated) DEVICE — TROCAR: Brand: KII FIOS FIRST ENTRY

## (undated) DEVICE — ENDO CARRY-ON PROCEDURE KIT INCLUDES ENZYMATIC SPONGE, GAUZE, BIOHAZARD LABEL, TRAY, LUBRICANT, DIRTY SCOPE LABEL, WATER LABEL, TRAY, DRAWSTRING PAD, AND DEFENDO 4-PIECE KIT.: Brand: ENDO CARRY-ON PROCEDURE KIT

## (undated) DEVICE — GOWN,SIRUS,NONRNF,SETINSLV,2XL,18/CS: Brand: MEDLINE

## (undated) DEVICE — SOL IRR SOD CL 0.9% 3000ML --

## (undated) DEVICE — ENDOLOOP SUT LIGATURE 18IN -- 12/BX PDS II

## (undated) DEVICE — SYR ASSEMB INFL BLLN 60ML --

## (undated) DEVICE — CANNULATING SPHINCTEROTOME: Brand: JAGTOME™ REVOLUTION RX

## (undated) DEVICE — SINGLE USE AIR/WATER, SUCTION AND BIOPSY VALVE SET WITH WATER JET CONNECTOR: Brand: ORCAPOD™

## (undated) DEVICE — APPLIER CLP M L L11.4IN DIA10MM ENDOSCP ROT MULT FOR LIG

## (undated) DEVICE — SUTURE SZ 0 27IN 5/8 CIR UR-6  TAPER PT VIOLET ABSRB VICRYL J603H

## (undated) DEVICE — REM POLYHESIVE ADULT PATIENT RETURN ELECTRODE: Brand: VALLEYLAB

## (undated) DEVICE — TROCAR: Brand: KII® SLEEVE

## (undated) DEVICE — C-ARM: Brand: UNBRANDED

## (undated) DEVICE — DECANTER BAG 9": Brand: MEDLINE INDUSTRIES, INC.

## (undated) DEVICE — FORCEPS BX L160CM DIA8MM GRSP DISECT CUP TIP NONLOCKING ROT